# Patient Record
Sex: FEMALE | Race: WHITE | HISPANIC OR LATINO | Employment: UNEMPLOYED | URBAN - METROPOLITAN AREA
[De-identification: names, ages, dates, MRNs, and addresses within clinical notes are randomized per-mention and may not be internally consistent; named-entity substitution may affect disease eponyms.]

---

## 2017-01-16 ENCOUNTER — HOSPITAL ENCOUNTER (OUTPATIENT)
Dept: LAB | Facility: MEDICAL CENTER | Age: 67
End: 2017-01-16
Attending: NURSE PRACTITIONER
Payer: COMMERCIAL

## 2017-01-16 LAB
ALBUMIN SERPL BCP-MCNC: 4.2 G/DL (ref 3.2–4.9)
ALBUMIN/GLOB SERPL: 0.9 G/DL
ALP SERPL-CCNC: 242 U/L (ref 30–99)
ALT SERPL-CCNC: 93 U/L (ref 2–50)
AMYLASE SERPL-CCNC: 97 U/L (ref 20–103)
ANION GAP SERPL CALC-SCNC: 8 MMOL/L (ref 0–11.9)
AST SERPL-CCNC: 41 U/L (ref 12–45)
BILIRUB SERPL-MCNC: 1.4 MG/DL (ref 0.1–1.5)
BUN SERPL-MCNC: 27 MG/DL (ref 8–22)
CALCIUM SERPL-MCNC: 10 MG/DL (ref 8.5–10.5)
CHLORIDE SERPL-SCNC: 107 MMOL/L (ref 96–112)
CHOLEST SERPL-MCNC: 173 MG/DL (ref 100–199)
CO2 SERPL-SCNC: 23 MMOL/L (ref 20–33)
CREAT SERPL-MCNC: 1.41 MG/DL (ref 0.5–1.4)
GLOBULIN SER CALC-MCNC: 4.5 G/DL (ref 1.9–3.5)
GLUCOSE SERPL-MCNC: 121 MG/DL (ref 65–99)
HAV IGM SERPL QL IA: NEGATIVE
HBV CORE IGM SERPL QL IA: NEGATIVE
HBV SURFACE AG SERPL QL IA: NEGATIVE
HCV AB S/CO SERPL IA: NEGATIVE
HDLC SERPL-MCNC: 45 MG/DL
HIV 1+2 AB+HIV1 P24 AG SERPL QL IA: NON REACTIVE
LDLC SERPL CALC-MCNC: 107 MG/DL
LIPASE SERPL-CCNC: 75 U/L (ref 11–82)
POTASSIUM SERPL-SCNC: 4.7 MMOL/L (ref 3.6–5.5)
PROT SERPL-MCNC: 8.7 G/DL (ref 6–8.2)
SODIUM SERPL-SCNC: 138 MMOL/L (ref 135–145)
TRIGL SERPL-MCNC: 107 MG/DL (ref 0–149)
TSH SERPL DL<=0.005 MIU/L-ACNC: 2.21 UIU/ML (ref 0.3–3.7)

## 2017-01-16 PROCEDURE — 80074 ACUTE HEPATITIS PANEL: CPT

## 2017-01-16 PROCEDURE — 36415 COLL VENOUS BLD VENIPUNCTURE: CPT

## 2017-01-16 PROCEDURE — 84075 ASSAY ALKALINE PHOSPHATASE: CPT

## 2017-01-16 PROCEDURE — 82150 ASSAY OF AMYLASE: CPT

## 2017-01-16 PROCEDURE — 84443 ASSAY THYROID STIM HORMONE: CPT

## 2017-01-16 PROCEDURE — 80053 COMPREHEN METABOLIC PANEL: CPT

## 2017-01-16 PROCEDURE — 87389 HIV-1 AG W/HIV-1&-2 AB AG IA: CPT

## 2017-01-16 PROCEDURE — 83690 ASSAY OF LIPASE: CPT

## 2017-01-16 PROCEDURE — 80061 LIPID PANEL: CPT

## 2017-01-16 PROCEDURE — 84080 ASSAY ALKALINE PHOSPHATASES: CPT

## 2017-01-22 LAB
ALP BONE SERPL-CCNC: 99 U/L (ref 0–55)
ALP ISOS SERPL HS-CCNC: 0 U/L
ALP LIVER SERPL-CCNC: 191 U/L (ref 0–94)
ALP SERPL-CCNC: 290 U/L (ref 40–120)

## 2017-01-30 ENCOUNTER — APPOINTMENT (OUTPATIENT)
Dept: LAB | Facility: MEDICAL CENTER | Age: 67
End: 2017-01-30
Attending: NURSE PRACTITIONER
Payer: COMMERCIAL

## 2017-02-01 ENCOUNTER — HOSPITAL ENCOUNTER (OUTPATIENT)
Dept: LAB | Facility: MEDICAL CENTER | Age: 67
End: 2017-02-01
Attending: NURSE PRACTITIONER
Payer: MEDICARE

## 2017-02-01 LAB
ALBUMIN SERPL BCP-MCNC: 4.4 G/DL (ref 3.2–4.9)
ALP SERPL-CCNC: 136 U/L (ref 30–99)
ALT SERPL-CCNC: 25 U/L (ref 2–50)
AST SERPL-CCNC: 24 U/L (ref 12–45)
BILIRUB CONJ SERPL-MCNC: 0.3 MG/DL (ref 0.1–0.5)
BILIRUB INDIRECT SERPL-MCNC: 0.4 MG/DL (ref 0–1)
BILIRUB SERPL-MCNC: 0.7 MG/DL (ref 0.1–1.5)
CA-I SERPL-SCNC: 1.2 MMOL/L (ref 1.1–1.3)
HAV IGM SERPL QL IA: NEGATIVE
HBV CORE IGM SERPL QL IA: NEGATIVE
HBV SURFACE AG SERPL QL IA: NEGATIVE
HCV AB S/CO SERPL IA: NEGATIVE
PROT SERPL-MCNC: 8.8 G/DL (ref 6–8.2)
PTH-INTACT SERPL-MCNC: 113.8 PG/ML (ref 14–72)

## 2017-02-01 PROCEDURE — 36415 COLL VENOUS BLD VENIPUNCTURE: CPT

## 2017-02-01 PROCEDURE — 82107 ALPHA-FETOPROTEIN L3: CPT

## 2017-02-01 PROCEDURE — 82330 ASSAY OF CALCIUM: CPT

## 2017-02-01 PROCEDURE — 80074 ACUTE HEPATITIS PANEL: CPT | Mod: GA

## 2017-02-01 PROCEDURE — 80076 HEPATIC FUNCTION PANEL: CPT

## 2017-02-01 PROCEDURE — 83970 ASSAY OF PARATHORMONE: CPT

## 2017-02-06 LAB
AFP L3 MFR SERPL: <0.5 % (ref 0–9.9)
AFP SERPL-MCNC: 3 NG/ML (ref 0–15)

## 2017-02-08 ENCOUNTER — OFFICE VISIT (OUTPATIENT)
Dept: CARDIOLOGY | Facility: MEDICAL CENTER | Age: 67
End: 2017-02-08
Payer: MEDICARE

## 2017-02-08 VITALS
SYSTOLIC BLOOD PRESSURE: 132 MMHG | HEART RATE: 82 BPM | HEIGHT: 62 IN | DIASTOLIC BLOOD PRESSURE: 82 MMHG | WEIGHT: 143 LBS | BODY MASS INDEX: 26.31 KG/M2 | OXYGEN SATURATION: 97 %

## 2017-02-08 DIAGNOSIS — R07.89 OTHER CHEST PAIN: ICD-10-CM

## 2017-02-08 DIAGNOSIS — R94.31 NONSPECIFIC ABNORMAL ELECTROCARDIOGRAM (ECG) (EKG): ICD-10-CM

## 2017-02-08 PROCEDURE — 3014F SCREEN MAMMO DOC REV: CPT | Mod: 8P | Performed by: INTERNAL MEDICINE

## 2017-02-08 PROCEDURE — G8420 CALC BMI NORM PARAMETERS: HCPCS | Performed by: INTERNAL MEDICINE

## 2017-02-08 PROCEDURE — 1101F PT FALLS ASSESS-DOCD LE1/YR: CPT | Mod: 8P | Performed by: INTERNAL MEDICINE

## 2017-02-08 PROCEDURE — 4040F PNEUMOC VAC/ADMIN/RCVD: CPT | Mod: 8P | Performed by: INTERNAL MEDICINE

## 2017-02-08 PROCEDURE — 3017F COLORECTAL CA SCREEN DOC REV: CPT | Mod: 8P | Performed by: INTERNAL MEDICINE

## 2017-02-08 PROCEDURE — G8432 DEP SCR NOT DOC, RNG: HCPCS | Performed by: INTERNAL MEDICINE

## 2017-02-08 PROCEDURE — G8484 FLU IMMUNIZE NO ADMIN: HCPCS | Performed by: INTERNAL MEDICINE

## 2017-02-08 PROCEDURE — 4004F PT TOBACCO SCREEN RCVD TLK: CPT | Mod: 8P | Performed by: INTERNAL MEDICINE

## 2017-02-08 PROCEDURE — 99204 OFFICE O/P NEW MOD 45 MIN: CPT | Performed by: INTERNAL MEDICINE

## 2017-02-08 RX ORDER — PRAVASTATIN SODIUM 10 MG
10 TABLET ORAL
Status: ON HOLD | COMMUNITY
End: 2019-02-25

## 2017-02-08 RX ORDER — RANITIDINE 150 MG/1
150 TABLET ORAL
Status: ON HOLD | COMMUNITY
End: 2019-11-07

## 2017-02-08 RX ORDER — LANCETS 33 GAUGE
EACH MISCELLANEOUS
COMMUNITY
End: 2018-08-31

## 2017-02-08 RX ORDER — AMLODIPINE BESYLATE 10 MG/1
10 TABLET ORAL
Status: ON HOLD | COMMUNITY
End: 2019-11-07

## 2017-02-08 ASSESSMENT — ENCOUNTER SYMPTOMS
NERVOUS/ANXIOUS: 0
PALPITATIONS: 0
SHORTNESS OF BREATH: 0
HEADACHES: 0
BLURRED VISION: 0
COUGH: 0
EYE DISCHARGE: 0
PND: 0
NAUSEA: 0
HEARTBURN: 0
DEPRESSION: 0
FEVER: 0
DIZZINESS: 0
BRUISES/BLEEDS EASILY: 0
CHILLS: 0
MYALGIAS: 0

## 2017-02-08 NOTE — Clinical Note
"     [facility]   [address]  [place] [address]  Phone: [phone]  Fax: [phone]              Sandra Pretty  1950    Encounter Date: 2/8/2017    Will Guzman M.D.          PROGRESS NOTE:  Subjective:   Sandra Pretty is a 67 y.o. female who presents today in referral from Jamilah Hagan for chest discomfort and abnormal EKG. On January 14 of this year the patient after an evening meal developed right upper quadrant pain radiating to the epigastrium and into the left shoulder. This is associated with nausea and vomiting. The episode lasted 3 hours.  She saw her primary care provider the next day. An EKG was done and she is referred here for \"abnormal EKG\".  The patient's prior medical history is positive for treated hypertension treated hyperlipidemia and treated hypothyroidism. She also has type II diabetes mellitus and states blood sugars are quite good.  Social history she is . No tobacco use no alcohol.  Surgery: Breast biopsy cyst resection and surgery for trauma to the left orbital area  Family history: Negative for premature coronary artery disease.    Office EKG today is normal    Past Medical History   Diagnosis Date   • Diabetes      Past Surgical History   Procedure Laterality Date   • Breast biopsy       benign left biopsy in 2005   • Us-needle core bx-breast panel       No family history on file.  History   Smoking status   • Never Smoker    Smokeless tobacco   • Not on file     No Known Allergies  Outpatient Encounter Prescriptions as of 2/8/2017   Medication Sig Dispense Refill   • ranitidine (ZANTAC) 150 MG Tab Take 150 mg by mouth 2 times a day.     • amlodipine (NORVASC) 10 MG Tab Take 10 mg by mouth every day.     • pravastatin (PRAVACHOL) 10 MG Tab Take 10 mg by mouth every evening.     • LANCETS MICRO THIN 33G Misc by Does not apply route.     • levothyroxine (SYNTHROID) 125 " "MCG TABS Take 125 mcg by mouth every day.     • sertraline (ZOLOFT) 50 MG TABS Take 50 mg by mouth every day.     • glyBURIDE (DIABETA) 5 MG TABS Take 5 mg by mouth every morning with breakfast.     • metformin (GLUCOPHAGE) 500 MG TABS Take 500 mg by mouth. 3 tabs in the am 2 tabs in the evening     • hydrocodone-acetaminophen (VICODIN) 5-500 MG TABS Take 1-2 Tabs by mouth every four hours as needed for 20 doses. 20 Each 0     No facility-administered encounter medications on file as of 2/8/2017.     Review of Systems   Constitutional: Negative for fever, chills and malaise/fatigue.   Eyes: Negative for blurred vision and discharge.   Respiratory: Negative for cough and shortness of breath.    Cardiovascular: Negative for chest pain, palpitations, leg swelling and PND.   Gastrointestinal: Negative for heartburn and nausea.   Genitourinary: Negative for dysuria and urgency.   Musculoskeletal: Negative for myalgias.   Skin: Negative for itching and rash.   Neurological: Negative for dizziness and headaches.   Endo/Heme/Allergies: Negative for environmental allergies. Does not bruise/bleed easily.   Psychiatric/Behavioral: Negative for depression. The patient is not nervous/anxious.         Objective:   /82 mmHg  Pulse 82  Ht 1.575 m (5' 2.01\")  Wt 64.864 kg (143 lb)  BMI 26.15 kg/m2  SpO2 97%    Physical Exam   Constitutional: She is oriented to person, place, and time. She appears well-developed and well-nourished.   HENT:   Head: Normocephalic and atraumatic.   Eyes: Conjunctivae and EOM are normal. No scleral icterus.   Neck: Neck supple. No JVD present. No thyromegaly present.   Cardiovascular: Normal rate, regular rhythm, normal heart sounds and intact distal pulses.  Exam reveals no gallop and no friction rub.    No murmur heard.  Pulmonary/Chest: Effort normal and breath sounds normal. No respiratory distress. She has no wheezes. She has no rales. She exhibits no tenderness.   Abdominal: Soft. Bowel " sounds are normal. She exhibits no distension and no mass. There is no tenderness.   Neurological: She is alert and oriented to person, place, and time. Coordination normal.   Skin: Skin is warm and dry. No rash noted. No pallor.   Psychiatric: She has a normal mood and affect. Her behavior is normal. Judgment and thought content normal.       Assessment:     1. Other chest pain     2. Nonspecific abnormal electrocardiogram (ECG) (EKG)         Medical Decision Making:  Today's Assessment / Status / Plan:   The patient's single syndrome of chest and abdominal and left shoulder pain is very atypical for a cardiac etiology.  With nausea and vomiting one wonders about viral gastroenteritis or biliary colic.  Nevertheless, with a normal office EKG noted here today and no further symptoms I did not recommend any cardiac evaluation.  Thank you for this consultation        Jamilah Hagan, N.P.  [address] [facility] [facility] [address]  VIA Facsimile: [phone]     ESTEBAN Meza  [address] [address]  VIA Facsimile: [phone]

## 2017-02-08 NOTE — MR AVS SNAPSHOT
"        Sandra Francois Pretty   2017 3:15 PM   Office Visit   MRN: [id number]    Department:  Heart [facility]   Dept Phone:  [phone]    Description:  Female : 1950   Provider:  Will Guzman M.D.           Allergies as of 2017     No Known Allergies      You were diagnosed with     Other chest pain   [786.59.ICD-9-CM]       Nonspecific abnormal electrocardiogram (ECG) (EKG)   [794.31.ICD-9-CM]         Vital Signs     Blood Pressure Pulse Height Weight Body Mass Index Oxygen Saturation    132/82 mmHg 82 1.575 m (5' 2.01\") 64.864 kg (143 lb) 26.15 kg/m2 97%    Smoking Status                   Never Smoker            Basic Information     Date Of Birth Sex Race Ethnicity Preferred Language    1950 Female White  Origin (German,East Timorese,Lebanese,Micronesian, etc) English      Health Maintenance     Patient has no pending health maintenance at this time      Current Immunizations     No immunizations on file.      Below and/or attached are the medications your provider expects you to take. Review all of your home medications and newly ordered medications with your provider and/or pharmacist. Follow medication instructions as directed by your provider and/or pharmacist. Please keep your medication list with you and share with your provider. Update the information when medications are discontinued, doses are changed, or new medications (including over-the-counter products) are added; and carry medication information at all times in the event of emergency situations     Allergies:  No Known Allergies          Medications  Valid as of: 2017 -  4:09 PM    Generic Name Brand Name Tablet Size Instructions for use    AmLODIPine Besylate (Tab) NORVASC 10 MG Take 10 mg by mouth every day.        GlyBURIDE (Tab) DIABETA 5 MG Take 5 mg by mouth every morning with breakfast.        Hydrocodone-Acetaminophen (Tab) VICODIN 5-500 MG Take 1-2 Tabs by mouth every four hours as needed " for 20 doses.        Lancets (Misc) LANCETS MICRO THIN 33G  by Does not apply route.        Levothyroxine Sodium (Tab) SYNTHROID 125 MCG Take 125 mcg by mouth every day.        MetFORMIN HCl (Tab) GLUCOPHAGE 500 MG Take 500 mg by mouth. 3 tabs in the am 2 tabs in the evening        Pravastatin Sodium (Tab) PRAVACHOL 10 MG Take 10 mg by mouth every evening.        RaNITidine HCl (Tab) ZANTAC 150 MG Take 150 mg by mouth 2 times a day.        Sertraline HCl (Tab) ZOLOFT 50 MG Take 50 mg by mouth every day.        .                 Medicines prescribed today were sent to:     [facility], [place] - [address]    [address] [place] [address]    Phone: [phone] Fax: [phone]    Open 24 Hours?: No      Medication refill instructions:       If your prescription bottle indicates you have medication refills left, it is not necessary to call your provider’s office. Please contact your pharmacy and they will refill your medication.    If your prescription bottle indicates you do not have any refills left, you may request refills at any time through one of the following ways: The online [employer] system (except Urgent Care), by calling your provider’s office, or by asking your pharmacy to contact your provider’s office with a refill request. Medication refills are processed only during regular business hours and may not be available until the next business day. Your provider may request additional information or to have a follow-up visit with you prior to refilling your medication.   *Please Note: Medication refills are assigned a new Rx number when refilled electronically. Your pharmacy may indicate that no refills were authorized even though a new prescription for the same medication is available at the pharmacy. Please request the medicine by name with the pharmacy before contacting your provider for a refill.           MyChart Status: Patient Declined

## 2017-02-09 NOTE — PROGRESS NOTES
"Subjective:   Sandra Pretty is a 67 y.o. female who presents today in referral from Jamilah Hagan for chest discomfort and abnormal EKG. On January 14 of this year the patient after an evening meal developed right upper quadrant pain radiating to the epigastrium and into the left shoulder. This is associated with nausea and vomiting. The episode lasted 3 hours.  She saw her primary care provider the next day. An EKG was done and she is referred here for \"abnormal EKG\".  The patient's prior medical history is positive for treated hypertension treated hyperlipidemia and treated hypothyroidism. She also has type II diabetes mellitus and states blood sugars are quite good.  Social history she is . No tobacco use no alcohol.  Surgery: Breast biopsy cyst resection and surgery for trauma to the left orbital area  Family history: Negative for premature coronary artery disease.    Office EKG today is normal    Past Medical History   Diagnosis Date   • Diabetes      Past Surgical History   Procedure Laterality Date   • Breast biopsy       benign left biopsy in 2005   • Us-needle core bx-breast panel       No family history on file.  History   Smoking status   • Never Smoker    Smokeless tobacco   • Not on file     No Known Allergies  Outpatient Encounter Prescriptions as of 2/8/2017   Medication Sig Dispense Refill   • ranitidine (ZANTAC) 150 MG Tab Take 150 mg by mouth 2 times a day.     • amlodipine (NORVASC) 10 MG Tab Take 10 mg by mouth every day.     • pravastatin (PRAVACHOL) 10 MG Tab Take 10 mg by mouth every evening.     • LANCETS MICRO THIN 33G Misc by Does not apply route.     • levothyroxine (SYNTHROID) 125 MCG TABS Take 125 mcg by mouth every day.     • sertraline (ZOLOFT) 50 MG TABS Take 50 mg by mouth every day.     • glyBURIDE (DIABETA) 5 MG TABS Take 5 mg by mouth every morning with breakfast.     • metformin (GLUCOPHAGE) 500 MG TABS Take 500 mg by mouth. 3 tabs in the am 2 tabs in the " "evening     • hydrocodone-acetaminophen (VICODIN) 5-500 MG TABS Take 1-2 Tabs by mouth every four hours as needed for 20 doses. 20 Each 0     No facility-administered encounter medications on file as of 2/8/2017.     Review of Systems   Constitutional: Negative for fever, chills and malaise/fatigue.   Eyes: Negative for blurred vision and discharge.   Respiratory: Negative for cough and shortness of breath.    Cardiovascular: Negative for chest pain, palpitations, leg swelling and PND.   Gastrointestinal: Negative for heartburn and nausea.   Genitourinary: Negative for dysuria and urgency.   Musculoskeletal: Negative for myalgias.   Skin: Negative for itching and rash.   Neurological: Negative for dizziness and headaches.   Endo/Heme/Allergies: Negative for environmental allergies. Does not bruise/bleed easily.   Psychiatric/Behavioral: Negative for depression. The patient is not nervous/anxious.         Objective:   /82 mmHg  Pulse 82  Ht 1.575 m (5' 2.01\")  Wt 64.864 kg (143 lb)  BMI 26.15 kg/m2  SpO2 97%    Physical Exam   Constitutional: She is oriented to person, place, and time. She appears well-developed and well-nourished.   HENT:   Head: Normocephalic and atraumatic.   Eyes: Conjunctivae and EOM are normal. No scleral icterus.   Neck: Neck supple. No JVD present. No thyromegaly present.   Cardiovascular: Normal rate, regular rhythm, normal heart sounds and intact distal pulses.  Exam reveals no gallop and no friction rub.    No murmur heard.  Pulmonary/Chest: Effort normal and breath sounds normal. No respiratory distress. She has no wheezes. She has no rales. She exhibits no tenderness.   Abdominal: Soft. Bowel sounds are normal. She exhibits no distension and no mass. There is no tenderness.   Neurological: She is alert and oriented to person, place, and time. Coordination normal.   Skin: Skin is warm and dry. No rash noted. No pallor.   Psychiatric: She has a normal mood and affect. Her " behavior is normal. Judgment and thought content normal.       Assessment:     1. Other chest pain     2. Nonspecific abnormal electrocardiogram (ECG) (EKG)         Medical Decision Making:  Today's Assessment / Status / Plan:   The patient's single syndrome of chest and abdominal and left shoulder pain is very atypical for a cardiac etiology.  With nausea and vomiting one wonders about viral gastroenteritis or biliary colic.  Nevertheless, with a normal office EKG noted here today and no further symptoms I did not recommend any cardiac evaluation.  Thank you for this consultation

## 2017-05-15 ENCOUNTER — HOSPITAL ENCOUNTER (OUTPATIENT)
Dept: LAB | Facility: MEDICAL CENTER | Age: 67
End: 2017-05-15
Attending: INTERNAL MEDICINE
Payer: MEDICARE

## 2017-05-15 LAB
AMYLASE SERPL-CCNC: 131 U/L (ref 20–103)
CRP SERPL HS-MCNC: 0.14 MG/DL (ref 0–0.75)
ERYTHROCYTE [SEDIMENTATION RATE] IN BLOOD BY WESTERGREN METHOD: 97 MM/HOUR (ref 0–30)
FERRITIN SERPL-MCNC: 261.6 NG/ML (ref 10–291)
GGT SERPL-CCNC: 31 U/L (ref 7–34)
HBV CORE AB SERPL QL IA: NEGATIVE
HBV SURFACE AB SERPL IA-ACNC: <3.1 MIU/ML (ref 0–10)
IRON SATN MFR SERPL: 34 % (ref 15–55)
IRON SERPL-MCNC: 108 UG/DL (ref 40–170)
LIPASE SERPL-CCNC: 145 U/L (ref 11–82)
TIBC SERPL-MCNC: 315 UG/DL (ref 250–450)

## 2017-05-15 PROCEDURE — 86140 C-REACTIVE PROTEIN: CPT

## 2017-05-15 PROCEDURE — 82728 ASSAY OF FERRITIN: CPT | Mod: GA

## 2017-05-15 PROCEDURE — 83540 ASSAY OF IRON: CPT | Mod: GA

## 2017-05-15 PROCEDURE — 83550 IRON BINDING TEST: CPT | Mod: GA

## 2017-05-15 PROCEDURE — 86235 NUCLEAR ANTIGEN ANTIBODY: CPT | Mod: 91

## 2017-05-15 PROCEDURE — 86708 HEPATITIS A ANTIBODY: CPT

## 2017-05-15 PROCEDURE — 86225 DNA ANTIBODY NATIVE: CPT

## 2017-05-15 PROCEDURE — 86704 HEP B CORE ANTIBODY TOTAL: CPT

## 2017-05-15 PROCEDURE — 85652 RBC SED RATE AUTOMATED: CPT

## 2017-05-15 PROCEDURE — 83690 ASSAY OF LIPASE: CPT

## 2017-05-15 PROCEDURE — 86706 HEP B SURFACE ANTIBODY: CPT

## 2017-05-15 PROCEDURE — 83516 IMMUNOASSAY NONANTIBODY: CPT | Mod: 91

## 2017-05-15 PROCEDURE — 86038 ANTINUCLEAR ANTIBODIES: CPT

## 2017-05-15 PROCEDURE — 82105 ALPHA-FETOPROTEIN SERUM: CPT | Mod: GA

## 2017-05-15 PROCEDURE — 86255 FLUORESCENT ANTIBODY SCREEN: CPT

## 2017-05-15 PROCEDURE — 36415 COLL VENOUS BLD VENIPUNCTURE: CPT

## 2017-05-15 PROCEDURE — 82150 ASSAY OF AMYLASE: CPT

## 2017-05-15 PROCEDURE — 82390 ASSAY OF CERULOPLASMIN: CPT

## 2017-05-15 PROCEDURE — 82784 ASSAY IGA/IGD/IGG/IGM EACH: CPT

## 2017-05-15 PROCEDURE — 82977 ASSAY OF GGT: CPT

## 2017-05-16 LAB
AFP-TM SERPL-MCNC: 3 NG/ML (ref 0–9)
IGA SERPL-MCNC: 615 MG/DL (ref 68–408)

## 2017-05-17 LAB
CERULOPLASMIN SERPL-MCNC: 27 MG/DL (ref 17–54)
HAV AB SER QL IA: POSITIVE
MITOCHONDRIA M2 IGG SER-ACNC: 12.1 UNITS (ref 0–20)
TTG IGA SER IA-ACNC: 2 U/ML (ref 0–3)

## 2017-05-19 LAB
DSDNA AB TITR SER CLIF: DETECTED {TITER}
DSDNA IGG TITR SER CLIF: ABNORMAL {TITER}
ENA SM IGG SER-ACNC: 0 AU/ML (ref 0–40)
ENA SS-B IGG SER IA-ACNC: 4 AU/ML (ref 0–40)
NUCLEAR IGG SER QL IA: DETECTED
NUCLEAR IGG TITR SER IF: ABNORMAL {TITER}
SSA52 R0ENA AB IGG Q0420: 13 AU/ML (ref 0–40)
SSA60 R0ENA AB IGG Q0419: 50 AU/ML (ref 0–40)
U1 SNRNP IGG SER QL: 1 AU/ML (ref 0–40)

## 2017-05-22 LAB
SMA IGG SER-ACNC: 26 UNITS (ref 0–19)
SMOOTH MUSCLE IGG TITR SER: ABNORMAL {TITER}

## 2017-05-30 ENCOUNTER — HOSPITAL ENCOUNTER (OUTPATIENT)
Dept: LAB | Facility: MEDICAL CENTER | Age: 67
End: 2017-05-30
Attending: INTERNAL MEDICINE
Payer: MEDICARE

## 2017-05-30 LAB
25(OH)D3 SERPL-MCNC: 37 NG/ML (ref 30–100)
ALBUMIN SERPL BCP-MCNC: 3.6 G/DL (ref 3.2–4.9)
APPEARANCE UR: CLEAR
BACTERIA #/AREA URNS HPF: ABNORMAL /HPF
BASOPHILS # BLD AUTO: 0.5 % (ref 0–1.8)
BASOPHILS # BLD: 0.03 K/UL (ref 0–0.12)
BILIRUB UR QL STRIP.AUTO: NEGATIVE
BUN SERPL-MCNC: 25 MG/DL (ref 8–22)
CALCIUM SERPL-MCNC: 9.6 MG/DL (ref 8.5–10.5)
CHLORIDE SERPL-SCNC: 105 MMOL/L (ref 96–112)
CO2 SERPL-SCNC: 24 MMOL/L (ref 20–33)
COLOR UR: YELLOW
CREAT SERPL-MCNC: 1.15 MG/DL (ref 0.5–1.4)
CREAT UR-MCNC: 179.3 MG/DL
EOSINOPHIL # BLD AUTO: 0.07 K/UL (ref 0–0.51)
EOSINOPHIL NFR BLD: 1.3 % (ref 0–6.9)
EPI CELLS #/AREA URNS HPF: ABNORMAL /HPF
ERYTHROCYTE [DISTWIDTH] IN BLOOD BY AUTOMATED COUNT: 47.4 FL (ref 35.9–50)
FERRITIN SERPL-MCNC: 312.1 NG/ML (ref 10–291)
GFR SERPL CREATININE-BSD FRML MDRD: 47 ML/MIN/1.73 M 2
GLUCOSE SERPL-MCNC: 252 MG/DL (ref 65–99)
GLUCOSE UR STRIP.AUTO-MCNC: ABNORMAL MG/DL
HCT VFR BLD AUTO: 32.7 % (ref 37–47)
HGB BLD-MCNC: 11.3 G/DL (ref 12–16)
HYALINE CASTS #/AREA URNS LPF: ABNORMAL /LPF
IMM GRANULOCYTES # BLD AUTO: 0.01 K/UL (ref 0–0.11)
IMM GRANULOCYTES NFR BLD AUTO: 0.2 % (ref 0–0.9)
IRON SATN MFR SERPL: 49 % (ref 15–55)
IRON SERPL-MCNC: 148 UG/DL (ref 40–170)
KETONES UR STRIP.AUTO-MCNC: NEGATIVE MG/DL
LEUKOCYTE ESTERASE UR QL STRIP.AUTO: NEGATIVE
LYMPHOCYTES # BLD AUTO: 1.6 K/UL (ref 1–4.8)
LYMPHOCYTES NFR BLD: 29.2 % (ref 22–41)
MCH RBC QN AUTO: 34.2 PG (ref 27–33)
MCHC RBC AUTO-ENTMCNC: 34.6 G/DL (ref 33.6–35)
MCV RBC AUTO: 99.1 FL (ref 81.4–97.8)
MICRO URNS: ABNORMAL
MONOCYTES # BLD AUTO: 0.32 K/UL (ref 0–0.85)
MONOCYTES NFR BLD AUTO: 5.8 % (ref 0–13.4)
MUCOUS THREADS #/AREA URNS HPF: ABNORMAL /HPF
NEUTROPHILS # BLD AUTO: 3.45 K/UL (ref 2–7.15)
NEUTROPHILS NFR BLD: 63 % (ref 44–72)
NITRITE UR QL STRIP.AUTO: NEGATIVE
NRBC # BLD AUTO: 0 K/UL
NRBC BLD AUTO-RTO: 0 /100 WBC
PH UR STRIP.AUTO: 6 [PH]
PHOSPHATE SERPL-MCNC: 3.7 MG/DL (ref 2.5–4.5)
PLATELET # BLD AUTO: 190 K/UL (ref 164–446)
PMV BLD AUTO: 10.1 FL (ref 9–12.9)
POTASSIUM SERPL-SCNC: 4.4 MMOL/L (ref 3.6–5.5)
PROT UR QL STRIP: 300 MG/DL
PROT UR-MCNC: 745.8 MG/DL (ref 0–15)
PROT/CREAT UR: 4160 MG/G (ref 10–107)
PTH-INTACT SERPL-MCNC: 113.7 PG/ML (ref 14–72)
RBC # BLD AUTO: 3.3 M/UL (ref 4.2–5.4)
RBC # URNS HPF: ABNORMAL /HPF
RBC UR QL AUTO: ABNORMAL
SODIUM SERPL-SCNC: 136 MMOL/L (ref 135–145)
SP GR UR STRIP.AUTO: 1.02
TIBC SERPL-MCNC: 305 UG/DL (ref 250–450)
URATE SERPL-MCNC: 7.6 MG/DL (ref 1.9–8.2)
WBC # BLD AUTO: 5.5 K/UL (ref 4.8–10.8)
WBC #/AREA URNS HPF: ABNORMAL /HPF

## 2017-05-30 PROCEDURE — 82306 VITAMIN D 25 HYDROXY: CPT

## 2017-05-30 PROCEDURE — 84550 ASSAY OF BLOOD/URIC ACID: CPT

## 2017-05-30 PROCEDURE — 83540 ASSAY OF IRON: CPT

## 2017-05-30 PROCEDURE — 85025 COMPLETE CBC W/AUTO DIFF WBC: CPT

## 2017-05-30 PROCEDURE — 36415 COLL VENOUS BLD VENIPUNCTURE: CPT

## 2017-05-30 PROCEDURE — 81001 URINALYSIS AUTO W/SCOPE: CPT

## 2017-05-30 PROCEDURE — 83970 ASSAY OF PARATHORMONE: CPT

## 2017-05-30 PROCEDURE — 80069 RENAL FUNCTION PANEL: CPT

## 2017-05-30 PROCEDURE — 83550 IRON BINDING TEST: CPT

## 2017-05-30 PROCEDURE — 82570 ASSAY OF URINE CREATININE: CPT

## 2017-05-30 PROCEDURE — 84156 ASSAY OF PROTEIN URINE: CPT

## 2017-05-30 PROCEDURE — 82728 ASSAY OF FERRITIN: CPT

## 2017-06-06 ENCOUNTER — HOSPITAL ENCOUNTER (OUTPATIENT)
Dept: LAB | Facility: MEDICAL CENTER | Age: 67
End: 2017-06-06
Attending: INTERNAL MEDICINE
Payer: MEDICARE

## 2017-06-06 LAB
AMYLASE SERPL-CCNC: 118 U/L (ref 20–103)
LIPASE SERPL-CCNC: 103 U/L (ref 11–82)

## 2017-06-06 PROCEDURE — 83690 ASSAY OF LIPASE: CPT

## 2017-06-06 PROCEDURE — 82150 ASSAY OF AMYLASE: CPT

## 2017-06-06 PROCEDURE — 83516 IMMUNOASSAY NONANTIBODY: CPT

## 2017-06-06 PROCEDURE — 36415 COLL VENOUS BLD VENIPUNCTURE: CPT

## 2017-06-06 PROCEDURE — 86256 FLUORESCENT ANTIBODY TITER: CPT

## 2017-06-09 LAB
SMA IGG SER-ACNC: 31 UNITS (ref 0–19)
SMOOTH MUSCLE IGG TITR SER: ABNORMAL {TITER}

## 2017-06-12 ENCOUNTER — HOSPITAL ENCOUNTER (OUTPATIENT)
Dept: LAB | Facility: MEDICAL CENTER | Age: 67
End: 2017-06-12
Attending: INTERNAL MEDICINE
Payer: MEDICARE

## 2017-06-12 LAB
ALBUMIN SERPL BCP-MCNC: 3.8 G/DL (ref 3.2–4.9)
ALP SERPL-CCNC: 116 U/L (ref 30–99)
ALT SERPL-CCNC: 29 U/L (ref 2–50)
AST SERPL-CCNC: 26 U/L (ref 12–45)
BILIRUB CONJ SERPL-MCNC: 0.2 MG/DL (ref 0.1–0.5)
BILIRUB INDIRECT SERPL-MCNC: 0.5 MG/DL (ref 0–1)
BILIRUB SERPL-MCNC: 0.7 MG/DL (ref 0.1–1.5)
C3 SERPL-MCNC: 122 MG/DL (ref 87–200)
C4 SERPL-MCNC: 13 MG/DL (ref 19–52)
CRP SERPL HS-MCNC: 0.17 MG/DL (ref 0–0.75)
ERYTHROCYTE [SEDIMENTATION RATE] IN BLOOD BY WESTERGREN METHOD: 116 MM/HOUR (ref 0–30)
PROT SERPL-MCNC: 8.2 G/DL (ref 6–8.2)

## 2017-06-12 PROCEDURE — 36415 COLL VENOUS BLD VENIPUNCTURE: CPT

## 2017-06-12 PROCEDURE — 80500 HCHG CLINICAL PATH CONSULT-LIMITED: CPT | Mod: 59

## 2017-06-12 PROCEDURE — 86225 DNA ANTIBODY NATIVE: CPT

## 2017-06-12 PROCEDURE — 85610 PROTHROMBIN TIME: CPT | Mod: GA

## 2017-06-12 PROCEDURE — 85730 THROMBOPLASTIN TIME PARTIAL: CPT | Mod: GA,91

## 2017-06-12 PROCEDURE — 86147 CARDIOLIPIN ANTIBODY EA IG: CPT

## 2017-06-12 PROCEDURE — 86146 BETA-2 GLYCOPROTEIN ANTIBODY: CPT

## 2017-06-12 PROCEDURE — 85732 THROMBOPLASTIN TIME PARTIAL: CPT

## 2017-06-12 PROCEDURE — 85613 RUSSELL VIPER VENOM DILUTED: CPT

## 2017-06-12 PROCEDURE — 85652 RBC SED RATE AUTOMATED: CPT

## 2017-06-12 PROCEDURE — 80076 HEPATIC FUNCTION PANEL: CPT

## 2017-06-12 PROCEDURE — 86255 FLUORESCENT ANTIBODY SCREEN: CPT

## 2017-06-12 PROCEDURE — 86140 C-REACTIVE PROTEIN: CPT

## 2017-06-12 PROCEDURE — 86160 COMPLEMENT ANTIGEN: CPT | Mod: 91

## 2017-06-13 LAB
APTT 1H NP PPP: 45.1 SEC (ref 24.7–36)
APTT 1H P INC POOL PPP: 31.6 SEC (ref 24.7–36)
APTT 1H P INC PPP: 50.7 SEC (ref 24.7–36)
APTT IMM NP PPP: 43.9 SEC (ref 24.7–36)
APTT P HEP NEUT PPP: 47.1 SEC (ref 24.7–36)
APTT POOL PPP: 31.5 SEC (ref 24.7–36)
APTT PPP: 44.5 SEC (ref 24.7–36)
APTT PPP: 44.5 SEC (ref 24.7–36)
DRVVT MIX 37 DRVMX37: 50.6 SEC (ref 28–48)
DRVVT MIX IMMEDIATE DRVMXI: 49.7 SEC (ref 28–48)
INR PPP: 0.92 (ref 0.87–1.13)
LA PPP-IMP: POSITIVE
LA PPP-IMP: PRESENT
PATH REV: NORMAL
PATH REV: NORMAL
PROTHROMBIN TIME: 12.6 SEC (ref 12–14.6)
SCREEN DRVVT: 56.8 SEC (ref 28–48)

## 2017-06-13 PROCEDURE — 80500 HCHG CLINICAL PATH CONSULT-LIMITED: CPT

## 2017-06-14 LAB
ANCA IGG TITR SER IF: NORMAL {TITER}
B2 GLYCOPROT1 IGG SERPL IA-ACNC: 6 SGU (ref 0–20)
B2 GLYCOPROT1 IGM SERPL IA-ACNC: 11 SMU (ref 0–20)
CARDIOLIPIN IGA SER IA-ACNC: 6 APL (ref 0–11)
CARDIOLIPIN IGG SER IA-ACNC: 14 GPL (ref 0–14)
CARDIOLIPIN IGM SER IA-ACNC: 17 MPL (ref 0–12)

## 2017-06-15 LAB
DSDNA AB TITR SER CLIF: DETECTED {TITER}
DSDNA IGG TITR SER CLIF: ABNORMAL {TITER}

## 2017-07-05 ENCOUNTER — APPOINTMENT (OUTPATIENT)
Dept: RADIOLOGY | Facility: MEDICAL CENTER | Age: 67
End: 2017-07-05
Attending: EMERGENCY MEDICINE
Payer: MEDICARE

## 2017-07-05 ENCOUNTER — HOSPITAL ENCOUNTER (EMERGENCY)
Facility: MEDICAL CENTER | Age: 67
End: 2017-07-05
Attending: EMERGENCY MEDICINE
Payer: MEDICARE

## 2017-07-05 VITALS
WEIGHT: 145.28 LBS | BODY MASS INDEX: 26.74 KG/M2 | HEART RATE: 68 BPM | RESPIRATION RATE: 13 BRPM | HEIGHT: 62 IN | OXYGEN SATURATION: 99 % | SYSTOLIC BLOOD PRESSURE: 152 MMHG | DIASTOLIC BLOOD PRESSURE: 64 MMHG | TEMPERATURE: 97.9 F

## 2017-07-05 DIAGNOSIS — K86.1 CHRONIC PANCREATITIS, UNSPECIFIED PANCREATITIS TYPE (HCC): ICD-10-CM

## 2017-07-05 LAB
ALBUMIN SERPL BCP-MCNC: 3.9 G/DL (ref 3.2–4.9)
ALBUMIN/GLOB SERPL: 1 G/DL
ALP SERPL-CCNC: 116 U/L (ref 30–99)
ALT SERPL-CCNC: 26 U/L (ref 2–50)
ANION GAP SERPL CALC-SCNC: 7 MMOL/L (ref 0–11.9)
APPEARANCE UR: CLEAR
AST SERPL-CCNC: 24 U/L (ref 12–45)
BACTERIA #/AREA URNS HPF: NEGATIVE /HPF
BASOPHILS # BLD AUTO: 0.3 % (ref 0–1.8)
BASOPHILS # BLD: 0.02 K/UL (ref 0–0.12)
BILIRUB SERPL-MCNC: 0.7 MG/DL (ref 0.1–1.5)
BILIRUB UR QL STRIP.AUTO: NEGATIVE
BUN SERPL-MCNC: 25 MG/DL (ref 8–22)
CALCIUM SERPL-MCNC: 9.4 MG/DL (ref 8.5–10.5)
CHLORIDE SERPL-SCNC: 108 MMOL/L (ref 96–112)
CO2 SERPL-SCNC: 22 MMOL/L (ref 20–33)
COLOR UR: YELLOW
CREAT SERPL-MCNC: 1.06 MG/DL (ref 0.5–1.4)
CULTURE IF INDICATED INDCX: NO UA CULTURE
EOSINOPHIL # BLD AUTO: 0.06 K/UL (ref 0–0.51)
EOSINOPHIL NFR BLD: 1 % (ref 0–6.9)
EPI CELLS #/AREA URNS HPF: NEGATIVE /HPF
ERYTHROCYTE [DISTWIDTH] IN BLOOD BY AUTOMATED COUNT: 42.5 FL (ref 35.9–50)
GFR SERPL CREATININE-BSD FRML MDRD: 52 ML/MIN/1.73 M 2
GLOBULIN SER CALC-MCNC: 4 G/DL (ref 1.9–3.5)
GLUCOSE SERPL-MCNC: 177 MG/DL (ref 65–99)
GLUCOSE UR STRIP.AUTO-MCNC: NEGATIVE MG/DL
HCT VFR BLD AUTO: 33.1 % (ref 37–47)
HGB BLD-MCNC: 11.6 G/DL (ref 12–16)
HYALINE CASTS #/AREA URNS LPF: ABNORMAL /LPF
IMM GRANULOCYTES # BLD AUTO: 0.02 K/UL (ref 0–0.11)
IMM GRANULOCYTES NFR BLD AUTO: 0.3 % (ref 0–0.9)
KETONES UR STRIP.AUTO-MCNC: NEGATIVE MG/DL
LEUKOCYTE ESTERASE UR QL STRIP.AUTO: NEGATIVE
LIPASE SERPL-CCNC: 125 U/L (ref 11–82)
LYMPHOCYTES # BLD AUTO: 1.75 K/UL (ref 1–4.8)
LYMPHOCYTES NFR BLD: 29.6 % (ref 22–41)
MCH RBC QN AUTO: 33.8 PG (ref 27–33)
MCHC RBC AUTO-ENTMCNC: 35 G/DL (ref 33.6–35)
MCV RBC AUTO: 96.5 FL (ref 81.4–97.8)
MICRO URNS: ABNORMAL
MONOCYTES # BLD AUTO: 0.38 K/UL (ref 0–0.85)
MONOCYTES NFR BLD AUTO: 6.4 % (ref 0–13.4)
NEUTROPHILS # BLD AUTO: 3.69 K/UL (ref 2–7.15)
NEUTROPHILS NFR BLD: 62.4 % (ref 44–72)
NITRITE UR QL STRIP.AUTO: NEGATIVE
NRBC # BLD AUTO: 0 K/UL
NRBC BLD AUTO-RTO: 0 /100 WBC
PH UR STRIP.AUTO: 5 [PH]
PLATELET # BLD AUTO: 185 K/UL (ref 164–446)
PMV BLD AUTO: 9.7 FL (ref 9–12.9)
POTASSIUM SERPL-SCNC: 4.4 MMOL/L (ref 3.6–5.5)
PROT SERPL-MCNC: 7.9 G/DL (ref 6–8.2)
PROT UR QL STRIP: 300 MG/DL
RBC # BLD AUTO: 3.43 M/UL (ref 4.2–5.4)
RBC # URNS HPF: ABNORMAL /HPF
RBC UR QL AUTO: ABNORMAL
SODIUM SERPL-SCNC: 137 MMOL/L (ref 135–145)
SP GR UR STRIP.AUTO: 1.01
TROPONIN I SERPL-MCNC: <0.01 NG/ML (ref 0–0.04)
WBC # BLD AUTO: 5.9 K/UL (ref 4.8–10.8)
WBC #/AREA URNS HPF: ABNORMAL /HPF

## 2017-07-05 PROCEDURE — 36415 COLL VENOUS BLD VENIPUNCTURE: CPT

## 2017-07-05 PROCEDURE — 74177 CT ABD & PELVIS W/CONTRAST: CPT

## 2017-07-05 PROCEDURE — 96374 THER/PROPH/DIAG INJ IV PUSH: CPT | Mod: XU

## 2017-07-05 PROCEDURE — 99285 EMERGENCY DEPT VISIT HI MDM: CPT

## 2017-07-05 PROCEDURE — 81001 URINALYSIS AUTO W/SCOPE: CPT

## 2017-07-05 PROCEDURE — 84484 ASSAY OF TROPONIN QUANT: CPT

## 2017-07-05 PROCEDURE — 700111 HCHG RX REV CODE 636 W/ 250 OVERRIDE (IP): Performed by: EMERGENCY MEDICINE

## 2017-07-05 PROCEDURE — 96375 TX/PRO/DX INJ NEW DRUG ADDON: CPT

## 2017-07-05 PROCEDURE — 700105 HCHG RX REV CODE 258: Performed by: EMERGENCY MEDICINE

## 2017-07-05 PROCEDURE — 80053 COMPREHEN METABOLIC PANEL: CPT

## 2017-07-05 PROCEDURE — 83690 ASSAY OF LIPASE: CPT

## 2017-07-05 PROCEDURE — 700117 HCHG RX CONTRAST REV CODE 255: Performed by: EMERGENCY MEDICINE

## 2017-07-05 PROCEDURE — 85025 COMPLETE CBC W/AUTO DIFF WBC: CPT

## 2017-07-05 RX ORDER — MORPHINE SULFATE 4 MG/ML
2 INJECTION, SOLUTION INTRAMUSCULAR; INTRAVENOUS
Status: DISCONTINUED | OUTPATIENT
Start: 2017-07-05 | End: 2017-07-05 | Stop reason: HOSPADM

## 2017-07-05 RX ORDER — OXYCODONE HYDROCHLORIDE AND ACETAMINOPHEN 5; 325 MG/1; MG/1
1 TABLET ORAL EVERY 6 HOURS PRN
Qty: 15 TAB | Refills: 0 | Status: SHIPPED | OUTPATIENT
Start: 2017-07-05 | End: 2018-04-20

## 2017-07-05 RX ORDER — SODIUM CHLORIDE 9 MG/ML
1000 INJECTION, SOLUTION INTRAVENOUS ONCE
Status: COMPLETED | OUTPATIENT
Start: 2017-07-05 | End: 2017-07-05

## 2017-07-05 RX ORDER — ONDANSETRON 4 MG/1
4 TABLET, ORALLY DISINTEGRATING ORAL EVERY 8 HOURS PRN
Qty: 12 TAB | Refills: 0 | Status: SHIPPED | OUTPATIENT
Start: 2017-07-05 | End: 2018-04-20

## 2017-07-05 RX ORDER — ONDANSETRON 2 MG/ML
4 INJECTION INTRAMUSCULAR; INTRAVENOUS
Status: DISCONTINUED | OUTPATIENT
Start: 2017-07-05 | End: 2017-07-05 | Stop reason: HOSPADM

## 2017-07-05 RX ORDER — SODIUM CHLORIDE 9 MG/ML
INJECTION, SOLUTION INTRAVENOUS CONTINUOUS
Status: DISCONTINUED | OUTPATIENT
Start: 2017-07-05 | End: 2017-07-05 | Stop reason: HOSPADM

## 2017-07-05 RX ADMIN — SODIUM CHLORIDE: 9 INJECTION, SOLUTION INTRAVENOUS at 11:17

## 2017-07-05 RX ADMIN — ONDANSETRON 4 MG: 2 INJECTION INTRAMUSCULAR; INTRAVENOUS at 10:09

## 2017-07-05 RX ADMIN — SODIUM CHLORIDE 1000 ML: 9 INJECTION, SOLUTION INTRAVENOUS at 10:09

## 2017-07-05 RX ADMIN — MORPHINE SULFATE 2 MG: 4 INJECTION INTRAVENOUS at 10:09

## 2017-07-05 RX ADMIN — IOHEXOL 80 ML: 350 INJECTION, SOLUTION INTRAVENOUS at 12:15

## 2017-07-05 ASSESSMENT — PAIN SCALES - GENERAL: PAINLEVEL_OUTOF10: 1

## 2017-07-05 NOTE — ED NOTES
Chief Complaint   Patient presents with   • LUQ Pain     2 weeks, denies constipation   • Cramping     LLQ       Seen at [facility] for same 2 weeks ago.  Had CT scan.  Given pain RX but has not yet resolved

## 2017-07-05 NOTE — ED AVS SNAPSHOT
"[employer] Access Code: [email or web address]  Expires: 7/23/2017  4:15 AM    Your email address is not on file at [facility].  Email Addresses are required for you to sign up for "[employer] please contact [phone] to verify your personal information and to provide your email address prior to attempting to register for "[employer].    [place] [address]  [place] [address]    "[employer]  A secure, online tool to manage your health information     [facility]’s "[employer]® is a secure, online tool that connects you to your personalized health information from the privacy of your home -- day or night - making it very easy for you to manage your healthcare. Once the activation process is completed, you can even access your medical information using the "[employer] lion, which is available for free in the Apple Lion store or Google Play store.     To learn more about "[employer] visit [email or web address]/"[employer]    There are two levels of access available (as shown below):   My Chart Features  [facility] Primary Care Doctor [facility]  Specialists [facility]  Urgent  Care Non-[facility] Primary Care Doctor   Email your healthcare team securely and privately 24/7 X X X    Manage appointments: schedule your next appointment; view details of past/upcoming appointments X      Request prescription refills. X      View recent personal medical records, including lab and immunizations X X X X   View health record, including health history, allergies, medications X X X X   Read reports about your outpatient visits, procedures, consult and ER notes X X X X   See your discharge summary, which is a recap of your hospital and/or ER visit that includes your diagnosis, lab results, and care plan X X  X     How to register for [employer]:  Once your e-mail address has been verified, follow the following steps to sign up for [employer].     1. Go to  [email or web address]  2. Click on the Sign Up Now box, which takes you to the New Member Sign Up  page. You will need to provide the following information:  a. Enter your [employer] Access Code exactly as it appears at the top of this page. (You will not need to use this code after you’ve completed the sign-up process. If you do not sign up before the expiration date, you must request a new code.)   b. Enter your date of birth.   c. Enter your home email address.   d. Click Submit, and follow the next screen’s instructions.  3. Create a [employer] ID. This will be your [employer] login ID and cannot be changed, so think of one that is secure and easy to remember.  4. Create a [employer] password. You can change your password at any time.  5. Enter your Password Reset Question and Answer. This can be used at a later time if you forget your password.   6. Enter your e-mail address. This allows you to receive e-mail notifications when new information is available in [employer].  7. Click Sign Up. You can now view your health information.    For assistance activating your [employer] account, call [phone]

## 2017-07-05 NOTE — ED PROVIDER NOTES
"ED Provider Note    CHIEF COMPLAINT  Chief Complaint   Patient presents with   • LUQ Pain     2 weeks, denies constipation   • Cramping     LLQ       HPI  Sandra Pretty is a 67 y.o. female who presents complaining of abdominal pain. She's had this for 2 weeks. The left upper quadrant to left lower quadrant. It sounds like she had a CT at [facility] which was nondiagnostic. Preceding this, the patient had cholecystectomy from what sounds to be gallstone pancreatitis. She's had no appetite and has been able to eat or drink. She's had however no change in bowel or bladder. No fever. No cough or cold symptoms. She didn't taking medicine which is not really helping. The last was earlier this morning. There is no other complaint.    Records from [facility] are reviewed, her last urine. That time they point out that she had a cholecystectomy on 4:15. She is complaining of the same pain that she has now. Hematocrit was 33. Lipase level is elevated at 612. ACL tear were normal. An x-ray showed constipation. CT scan showed possibly no severe pancreatitis.    PAST MEDICAL HISTORY  Past Medical History   Diagnosis Date   • Diabetes        FAMILY HISTORY  No family history on file.    SOCIAL HISTORY  Social History   Substance Use Topics   • Smoking status: Never Smoker    • Smokeless tobacco: Not on file   • Alcohol Use: No     She is here with her daughter and .    SURGICAL HISTORY  Past Surgical History   Procedure Laterality Date   • Breast biopsy       benign left biopsy in 2005   • Us-needle core bx-breast panel         CURRENT MEDICATIONS    I have reviewed the nurses notes and/or the list brought with the patient.    ALLERGIES  No Known Allergies    REVIEW OF SYSTEMS  See HPI for further details. Review of systems as above, otherwise all other systems are negative.     PHYSICAL EXAM  VITAL SIGNS: /64 mmHg  Pulse 73  Temp(Src) 36.6 °C (97.9 °F) (Temporal)  Resp 12  Ht 1.575 m (5' 2.01\")  " Wt 65.9 kg (145 lb 4.5 oz)  BMI 26.57 kg/m2  SpO2 98%  Constitutional: 5 otherwise well-appearing patient in no acute distress.  Not toxic, nor ill in appearance.  HENT: Mucus membranes moist.  Oropharynx is clear.  Eyes: No icterus. There is old left eye trauma.  Neck: Full nontender range of motion.  Lymphatic: No cervical lymphadenopathy noted.   Cardiovascular: Regular heart rate and rhythm.  No murmurs, rubs, nor gallop appreciated.   Thorax & Lungs: Chest is nontender.  Lungs are clear to auscultation with good air movement bilaterally.  No wheeze, rhonchi, nor rales.   Abdomen: Soft, with mild left upper quadrant tenderness. However no rebound nor guarding.  No mass, pulsatile mass, nor hepatosplenomegaly appreciated. There is no Muller sign.  Skin: No purpura nor petechia noted.  Extremities/Musculoskeletal: No sign of trauma.  Calves are nontender with no cords nor edema.  No Danielle's sign.  Pulses are intact all around.   Neurologic: Alert & oriented.  Strength and sensation is intact all around.  Gait is normal.  Psychiatric: Normal affect appropriate for the clinical situation.    LABS  Labs Reviewed   CBC WITH DIFFERENTIAL - Abnormal; Notable for the following:     RBC 3.43 (*)     Hemoglobin 11.6 (*)     Hematocrit 33.1 (*)     MCH 33.8 (*)     All other components within normal limits   COMP METABOLIC PANEL - Abnormal; Notable for the following:     Glucose 177 (*)     Bun 25 (*)     Alkaline Phosphatase 116 (*)     Globulin 4.0 (*)     All other components within normal limits   LIPASE - Abnormal; Notable for the following:     Lipase 125 (*)     All other components within normal limits   URINALYSIS,CULTURE IF INDICATED - Abnormal; Notable for the following:     Protein 300 (*)     Occult Blood Small (*)     All other components within normal limits   ESTIMATED GFR - Abnormal; Notable for the following:     GFR If Non  52 (*)     All other components within normal limits   URINE  MICROSCOPIC (W/UA) - Abnormal; Notable for the following:     RBC 2-5 (*)     All other components within normal limits   TROPONIN         RADIOLOGY/PROCEDURES  I have reviewed the patient's film interpretations myself, and they are read out by the radiologist as:   CT-ABDOMEN-PELVIS WITH   Final Result      No evidence of acute abdominal or pelvic pathology.      Aortic atherosclerosis without aneurysm.      Pancreas calcification suggesting history of chronic pancreatitis.      Old granulomatous disease.        .    MEDICAL RECORD  I have reviewed patient's medical record and pertinent results are listed above.    COURSE & MEDICAL DECISION MAKING  I have reviewed any medical record information, laboratory studies and radiographic results as noted above.  Patient presents with ongoing left upper quadrant pain, negative workup a few weeks ago. We did go ahead and do laboratories here. There is no evidence of leukocytosis or shift. She does have a mild anemia which is stable from last month. There is no evidence of hepatitis. Her lipase is mildly elevated. We went ahead and proceeded with a CT scan which shows some pancreatic calcification consistent with pancreatitis chronically. There is no evidence of inflammatory changes or other acute pathology. Patient was given a small dose of morphine and Zofran. Given IV fluids. She feels much better after this. I do wonder if she suffering from a chronic hepatitis. At this point, I discussed the patient's case with Dr. Black from GI. He thinks it's reasonable to go ahead and start the patient on some pancreatic enzymes, this was written for. We'll go ahead and write her for a few tablets of Percocet but recommended occasional Tylenol primarily for pain. Zofran for nausea. I have asked her to call tomorrow she is already heard from the office to establish follow-up. In the meantime, she should return here should she have any worsening symptoms at all, fever or any turn for  the worse. She should also follow with her personal doctor as well of course for ongoing management    FINAL IMPRESSION  1. Chronic pancreatitis, unspecified pancreatitis type (CMS-HCC)      2. Diabetes     This dictation was created using voice recognition software.    Electronically signed by: Mina Matias, 7/5/2017 9:55 AM

## 2017-07-05 NOTE — ED AVS SNAPSHOT
Home Care Instructions                                                                                                                Sandra Pretty   MRN: [id number]    Department:  [facility], Emergency Dept   Date of Visit:  7/5/2017            [facility], Emergency Dept    [address]    [place] [address]    Phone:  [phone]      You were seen by     Mina Matias M.D.      Your Diagnosis Was     Chronic pancreatitis, unspecified pancreatitis type (CMS-HCC)     K86.1       These are the medications you received during your hospitalization from 07/05/2017 0847 to 07/05/2017 1410     Date/Time Order Dose Route Action    07/05/2017 1117 NS infusion   Intravenous New Bag    07/05/2017 1009 morphine (pf) 4 mg/ml injection 2 mg 2 mg Intravenous Given    07/05/2017 1009 ondansetron (ZOFRAN) syringe/vial injection 4 mg 4 mg Intravenous Given    07/05/2017 1009 NS infusion 1,000 mL 1,000 mL Intravenous New Bag    07/05/2017 1215 iohexol (OMNIPAQUE) 350 mg/mL 80 mL Intravenous Given      Follow-up Information     1. Follow up with Jamilah Hagan N.P..    Specialty:  Family Medicine    Contact information    [address] [place] [address] [phone]          2. Schedule an appointment as soon as possible for a visit with Mayur Black M.D..    Specialty:  Gastroenterology    Contact information    [address]  [place] [address] [phone]        Medication Information     Review all of your home medications and newly ordered medications with your primary doctor and/or pharmacist as soon as possible. Follow medication instructions as directed by your doctor and/or pharmacist.     Please keep your complete medication list with you and share with your physician. Update the information when medications are discontinued, doses are changed, or new medications (including over-the-counter products) are added; and carry medication information at all  times in the event of emergency situations.               Medication List      START taking these medications        Instructions    Morning Afternoon Evening Bedtime    ondansetron 4 MG Tbdp   Commonly known as:  ZOFRAN ODT        Take 1 Tab by mouth every 8 hours as needed.   Dose:  4 mg                        oxycodone-acetaminophen 5-325 MG Tabs   Commonly known as:  PERCOCET        Take 1 Tab by mouth every 6 hours as needed (as needed for pain).   Dose:  1 Tab                        pancrelipase (Lip-Prot-Amyl) 11210 UNITS Cpep   Commonly known as:  PROTEAZE        Take 1 Cap by mouth 3 times a day, with meals.   Dose:  1 Cap                          ASK your doctor about these medications        Instructions    Morning Afternoon Evening Bedtime    amlodipine 10 MG Tabs   Commonly known as:  NORVASC        Take 10 mg by mouth every day.   Dose:  10 mg                        glyBURIDE 5 MG Tabs   Commonly known as:  DIABETA        Take 5 mg by mouth every morning with breakfast.   Dose:  5 mg                        hydrocodone-acetaminophen 5-500 MG Tabs   Commonly known as:  VICODIN        Take 1-2 Tabs by mouth every four hours as needed for 20 doses.   Dose:  1-2 Tab                        LANCETS MICRO THIN 33G Misc        by Does not apply route.                        levothyroxine 125 MCG Tabs   Commonly known as:  SYNTHROID        Take 125 mcg by mouth every day.   Dose:  125 mcg                        metformin 500 MG Tabs   Commonly known as:  GLUCOPHAGE        Take 500 mg by mouth. 3 tabs in the am 2 tabs in the evening   Dose:  500 mg                        pravastatin 10 MG Tabs   Commonly known as:  PRAVACHOL        Take 10 mg by mouth every evening.   Dose:  10 mg                        ranitidine 150 MG Tabs   Commonly known as:  ZANTAC        Take 150 mg by mouth 2 times a day.   Dose:  150 mg                        sertraline 50 MG Tabs   Commonly known as:  ZOLOFT        Take 50 mg by mouth  "every day.   Dose:  50 mg                             Where to Get Your Medications      You can get these medications from any pharmacy     Bring a paper prescription for each of these medications    - ondansetron 4 MG Tbdp  - oxycodone-acetaminophen 5-325 MG Tabs  - pancrelipase (Lip-Prot-Amyl) 27153 UNITS Cpep            Procedures and tests performed during your visit     CBC WITH DIFFERENTIAL    COMP METABOLIC PANEL    CONSENT FOR CONTRAST INJECTION    CT-ABDOMEN-PELVIS WITH    ESTIMATED GFR    IV Saline Lock    LIPASE    TROPONIN    URINALYSIS CULTURE, IF INDICATED    URINE MICROSCOPIC (W/UA)        Discharge Instructions       Low-Fat Diet for Pancreatitis or Gallbladder Conditions    I am concerned that your pancreas is continuing to give you problems.  For now, plenty of fluids.  Start Pancrealipase.  Occasional Tylenol (preferred) or a Percocet for continued pain.  Call in the morning if you have not already heard from Dr Black--I would like you seen ASAP.  Return to the ER for any worsening symptoms or turn for the worse.    A low-fat diet can be helpful if you have pancreatitis or a gallbladder condition. With these conditions, your pancreas and gallbladder have trouble digesting fats. A healthy eating plan with less fat will help rest your pancreas and gallbladder and reduce your symptoms.  WHAT DO I NEED TO KNOW ABOUT THIS DIET?  · Eat a low-fat diet.  ¨ Reduce your fat intake to less than 20-30% of your total daily calories. This is less than 50-60 g of fat per day.  ¨ Remember that you need some fat in your diet. Ask your dietician what your daily goal should be.  ¨ Choose nonfat and low-fat healthy foods. Look for the words \"nonfat,\" \"low fat,\" or \"fat free.\"  ¨ As a guide, look on the label and choose foods with less than 3 g of fat per serving. Eat only one serving.  · Avoid alcohol.  · Do not smoke. If you need help quitting, talk with your health care provider.  · Eat small frequent meals " instead of three large heavy meals.  WHAT FOODS CAN I EAT?  Grains  Include healthy grains and starches such as potatoes, wheat bread, fiber-rich cereal, and brown rice. Choose whole grain options whenever possible. In adults, whole grains should account for 45-65% of your daily calories.   Fruits and Vegetables  Eat plenty of fruits and vegetables. Fresh fruits and vegetables add fiber to your diet.  Meats and Other Protein Sources  Eat lean meat such as chicken and pork. Trim any fat off of meat before cooking it. Eggs, fish, and beans are other sources of protein. In adults, these foods should account for 10-35% of your daily calories.  Dairy  Choose low-fat milk and dairy options. Dairy includes fat and protein, as well as calcium.   Fats and Oils  Limit high-fat foods such as fried foods, sweets, baked goods, sugary drinks.   Other  Creamy sauces and condiments, such as mayonnaise, can add extra fat. Think about whether or not you need to use them, or use smaller amounts or low fat options.  WHAT FOODS ARE NOT RECOMMENDED?  · High fat foods, such as:  ¨ Baked goods.  ¨ Ice cream.  ¨ Maur[place] toast.  ¨ Sweet rolls.  ¨ Pizza.  ¨ Cheese bread.  ¨ Foods covered with batter, butter, creamy sauces, or cheese.  ¨ Fried foods.  ¨ Sugary drinks and desserts.  · Foods that cause gas or bloating     This information is not intended to replace advice given to you by your health care provider. Make sure you discuss any questions you have with your health care provider.     Document Released: 12/23/2014 Document Reviewed: 12/23/2014  [employer] Interactive Patient Education ©2016 [employer]    Dieta con bajo contenido de grasas para la pancreatitis o los trastornos de la vesícula  (Low-Fat Diet for Pancreatitis or Gallbladder Conditions)  Nelli dieta con bajo contenido de grasas puede ser útil si usted tiene pancreatitis o trastornos de la vesícula. En estos trastornos, el páncreas y la vesícula tienen dificultades para digerir  "las grasas. Un plan de alimentación saludable con menos grasas ayudará a que el páncreas y la vesícula descansen, y reducirá los síntomas.  ¿QUÉ VON SABER ACERCA DE ESTA DIETA?  · Consuma mara dieta con bajo contenido de grasas.  ¨ Reduzca el consumo de grasas a menos del 20 % al 30 % del total de calorías diarias. [place] representa menos de 50 a 60 gramos de grasas por día.  ¨ Recuerde que la dieta debe incluir algo de grasa. Consulte al nutricionista cuál debe ser cardona meta diaria.  ¨ Elija alimentos saludables sin grasas o con bajo contenido de grasas. Busque las palabras \"sin grasa\", \"bajo en grasas\" o \"bajo contenido de grasas\".  ¨ Gladys guía, dianne la etiqueta y elija alimentos con menos de 3 gramos de grasa por porción. Coma solo mara porción.  · Evite el alcohol.  · No fumar. Si necesita ayuda para dejar de fumar, hable con el médico.  · Kamla comidas pequeñas y frecuentes en lugar de 3 comidas abundantes y pesadas.  ¿QUÉ ALIMENTOS PUEDO COMER?  Cereales  Incluya granos y almidones saludables, gladys bora, pan integral, cereales ricos en fibras y arroz integral. Elija opciones de cereales integrales siempre que sea posible. En los adultos, los cereales integrales deben representar del 45 % al 65 % de las calorías diarias.   Frutas y verduras  Coma muchas frutas y verduras. Las frutas y verduras frescas aportan fibra a la dieta.  Yasmani y otras macias de proteínas  Coma yasmani magras, gladys leonid y cerdo. Quite la grasa de la carne antes de cocinarla. Los huevos, el pescado y los frijoles son otras macias de proteínas. En los adultos, estos alimentos deben representar entre el 10 % y el 35 % de las calorías diarias.  Lácteos  Elija leche y otros productos lácteos con bajo contenido de grasas. Los lácteos aportan grasas y proteínas, además de calcio.   Grasas y aceites  Limite los alimentos con alto contenido de grasas, gladys las comidas fritas, los dulces, los productos horneados y las bebidas azucaradas.   Otros  Los " condimentos y las salsas cremosas, ashely la mayonesa, pueden aportar grasa adicional. Piense si necesita usarlos o no, o use pequeñas cantidades u opciones con bajo contenido de grasas.  ¿QUÉ ALIMENTOS NO ESTÁN RECOMENDADOS?  · Los alimentos con alto contenido de grasas, por ejemplo:  ¨ Alimentos horneados.  ¨ Helados.  ¨ Tostadas francesas.  ¨ Panecillos dulces.  ¨ Pizza.  ¨ Pan de queso.  ¨ Alimentos rebozados o cubiertos con mantequilla, salsas cremosas o queso.  ¨ Comidas fritas.  ¨ Postres y bebidas azucaradas.  · Alimentos que causan gases o meteorismo     Esta información no tiene ashely fin reemplazar el consejo del médico. Asegúrese de hacerle al médico cualquier pregunta que tenga.     Document Released: 12/23/2014  [employer] Interactive Patient Education ©2016 [employer]            Patient Information     Patient Information    Following emergency treatment: all patient requiring follow-up care must return either to a private physician or a clinic if your condition worsens before you are able to obtain further medical attention, please return to the emergency room.     Billing Information    At [facility], we work to make the billing process streamlined for our patients.  Our Representatives are here to answer any questions you may have regarding your hospital bill.  If you have insurance coverage and have supplied your insurance information to us, we will submit a claim to your insurer on your behalf.  Should you have any questions regarding your bill, we can be reached online or by phone as follows:  Online: You are able pay your bills online or live chat with our representatives about any billing questions you may have. We are here to help Monday - Friday from 8:00am to 7:30pm and 9:00am - 12:00pm on Saturdays.  Please visit [email or web address]  for more information.   Phone:  [phone] or [phone]    Please note that your emergency physician, surgeon,  pathologist, radiologist, anesthesiologist, and other specialists are not employed by [facility] and will therefore bill separately for their services.  Please contact them directly for any questions concerning their bills at the numbers below:     Emergency Physician Services:  [phone]  [facility]:  [phone]  [facility]:  [phone]  [facility]:  [phone]    1. Your final bill may vary from the amount quoted upon discharge if all procedures are not complete at that time, or if your doctor has additional procedures of which we are not aware. You will receive an additional bill if you return to the Emergency Department at [facility] for suture removal regardless of the facility of which the sutures were placed.     2. Please arrange for settlement of this account at the emergency registration.    3. All self-pay accounts are due in full at the time of treatment.  If you are unable to meet this obligation then payment is expected within 4-5 days.     4. If you have had radiology studies (CT, X-ray, Ultrasound, MRI), you have received a preliminary result during your emergency department visit. Please contact the radiology department [phone] to receive a copy of your final result. Please discuss the Final result with your primary physician or with the follow up physician provided.     Crisis Hotline:  [place] Crisis Hotline:  8-653-LQIQKGJ or 1-245.980.6568  [place] Crisis Hotline:    1-394.603.1249 or 393-530-2283         ED Discharge Follow Up Questions    1. In order to provide you with very good care, we would like to follow up with a phone call in the next few days.  May we have your permission to contact you?     YES /  NO    2. What is the best phone number to call you? (       )_____-__________    3. What is the best time to call you?      Morning  /  Afternoon  /  Evening                   Patient Signature:   ____________________________________________________________    Date:  ____________________________________________________________

## 2017-07-05 NOTE — ED NOTES
Pt admits readiness for discharge.  Patient given discharge instructions and verbalized understanding via the  IPAD.  Vital signs are stable.  Pt denies any further needs.

## 2017-07-05 NOTE — ED AVS SNAPSHOT
7/5/2017    Sandra Francois Pretty  [address]  [place] [address]    Dear Sandra:    [facility] wants to ensure your discharge home is safe and you or your loved ones have had all of your questions answered regarding your care after you leave the hospital.    Below is a list of resources and contact information should you have any questions regarding your hospital stay, follow-up instructions, or active medical symptoms.    Questions or Concerns Regarding… Contact   Medical Questions Related to Your Discharge  (7 days a week, 8am-5pm) Contact a Nurse Care Coordinator   [phone]   Medical Questions Not Related to Your Discharge  (24 hours a day / 7 days a week)  Contact the Nurse Health Line   [phone]    Medications or Discharge Instructions Refer to your discharge packet   or contact your [facility] Primary Care Provider   [phone]   Follow-up Appointment(s) Schedule your appointment via [employer]   or contact Scheduling [phone]   Billing Review your statement via [employer]  or contact Billing [phone]   Medical Records Review your records via [employer]   or contact Medical Records [phone]     You may receive a telephone call within two days of discharge. This call is to make certain you understand your discharge instructions and have the opportunity to have any questions answered. You can also easily access your medical information, test results and upcoming appointments via the [employer] free online health management tool. You can learn more and sign up at [email or web address]. For assistance setting up your [employer] account, please call [phone].    Once again, we want to ensure your discharge home is safe and that you have a clear understanding of any next steps in your care. If you have any questions or concerns, please do not hesitate to contact us, we are here for you. Thank you for choosing [facility] for your healthcare needs.    Sincerely,    Your [facility] Healthcare Team

## 2017-07-05 NOTE — DISCHARGE INSTRUCTIONS
"Low-Fat Diet for Pancreatitis or Gallbladder Conditions    I am concerned that your pancreas is continuing to give you problems.  For now, plenty of fluids.  Start Pancrealipase.  Occasional Tylenol (preferred) or a Percocet for continued pain.  Call in the morning if you have not already heard from Dr Black--I would like you seen ASAP.  Return to the ER for any worsening symptoms or turn for the worse.    A low-fat diet can be helpful if you have pancreatitis or a gallbladder condition. With these conditions, your pancreas and gallbladder have trouble digesting fats. A healthy eating plan with less fat will help rest your pancreas and gallbladder and reduce your symptoms.  WHAT DO I NEED TO KNOW ABOUT THIS DIET?  · Eat a low-fat diet.  ¨ Reduce your fat intake to less than 20-30% of your total daily calories. This is less than 50-60 g of fat per day.  ¨ Remember that you need some fat in your diet. Ask your dietician what your daily goal should be.  ¨ Choose nonfat and low-fat healthy foods. Look for the words \"nonfat,\" \"low fat,\" or \"fat free.\"  ¨ As a guide, look on the label and choose foods with less than 3 g of fat per serving. Eat only one serving.  · Avoid alcohol.  · Do not smoke. If you need help quitting, talk with your health care provider.  · Eat small frequent meals instead of three large heavy meals.  WHAT FOODS CAN I EAT?  Grains  Include healthy grains and starches such as potatoes, wheat bread, fiber-rich cereal, and brown rice. Choose whole grain options whenever possible. In adults, whole grains should account for 45-65% of your daily calories.   Fruits and Vegetables  Eat plenty of fruits and vegetables. Fresh fruits and vegetables add fiber to your diet.  Meats and Other Protein Sources  Eat lean meat such as chicken and pork. Trim any fat off of meat before cooking it. Eggs, fish, and beans are other sources of protein. In adults, these foods should account for 10-35% of your daily " "calories.  Dairy  Choose low-fat milk and dairy options. Dairy includes fat and protein, as well as calcium.   Fats and Oils  Limit high-fat foods such as fried foods, sweets, baked goods, sugary drinks.   Other  Creamy sauces and condiments, such as mayonnaise, can add extra fat. Think about whether or not you need to use them, or use smaller amounts or low fat options.  WHAT FOODS ARE NOT RECOMMENDED?  · High fat foods, such as:  ¨ Baked goods.  ¨ Ice cream.  ¨ [place] toast.  ¨ Sweet rolls.  ¨ Pizza.  ¨ Cheese bread.  ¨ Foods covered with batter, butter, creamy sauces, or cheese.  ¨ Fried foods.  ¨ Sugary drinks and desserts.  · Foods that cause gas or bloating     This information is not intended to replace advice given to you by your health care provider. Make sure you discuss any questions you have with your health care provider.     Document Released: 12/23/2014 Document Reviewed: 12/23/2014  [employer] Interactive Patient Education ©2016 [employer] Inc.    Dieta con bajo contenido de grasas para la pancreatitis o los trastornos de la vesícula  (Low-Fat Diet for Pancreatitis or Gallbladder Conditions)  Nelli dieta con bajo contenido de grasas puede ser útil si usted tiene pancreatitis o trastornos de la vesícula. En estos trastornos, el páncreas y la vesícula tienen dificultades para digerir las grasas. Un plan de alimentación saludable con menos grasas ayudará a que el páncreas y la vesícula descansen, y reducirá los síntomas.  ¿QUÉ VON SABER ACERCA DE ESTA DIETA?  · Consuma nelli dieta con bajo contenido de grasas.  ¨ Reduzca el consumo de grasas a menos del 20 % al 30 % del total de calorías diarias. [place] representa menos de 50 a 60 gramos de grasas por día.  ¨ Recuerde que la dieta debe incluir algo de grasa. Consulte al nutricionista cuál debe ser cardona meta diaria.  ¨ Elija alimentos saludables sin grasas o con bajo contenido de grasas. Busque las palabras \"sin grasa\", \"bajo en grasas\" o \"bajo contenido de " "grasas\".  ¨ [place] guía, dianne la etiqueta y elija alimentos con menos de 3 gramos de grasa por porción. Coma solo mara porción.  · Evite el alcohol.  · No fumar. Si necesita ayuda para dejar de fumar, hable con el médico.  · Kamla comidas pequeñas y frecuentes en lugar de 3 comidas abundantes y pesadas.  ¿QUÉ ALIMENTOS PUEDO COMER?  Cereales  Incluya granos y almidones saludables, ashely bora, pan integral, cereales ricos en fibras y arroz integral. Elija opciones de cereales integrales siempre que sea posible. En los adultos, los cereales integrales deben representar del 45 % al 65 % de las calorías diarias.   Frutas y verduras  Coma muchas frutas y verduras. Las frutas y verduras frescas aportan fibra a la dieta.  Yasmani y otras macias de proteínas  Coma yasmani magras, ashely leonid y cerdo. Quite la grasa de la carne antes de cocinarla. Los huevos, el pescado y los frijoles son otras macias de proteínas. En los adultos, estos alimentos deben representar entre el 10 % y el 35 % de las calorías diarias.  Lácteos  Elija leche y otros productos lácteos con bajo contenido de grasas. Los lácteos aportan grasas y proteínas, además de calcio.   Grasas y aceites  Limite los alimentos con alto contenido de grasas, ashely las comidas fritas, los dulces, los productos horneados y las bebidas azucaradas.   Otros  Los condimentos y las salsas cremosas, ashely la mayonesa, pueden aportar grasa adicional. Piense si necesita usarlos o no, o use pequeñas cantidades u opciones con bajo contenido de grasas.  ¿QUÉ ALIMENTOS NO ESTÁN RECOMENDADOS?  · Los alimentos con alto contenido de grasas, por ejemplo:  ¨ Alimentos horneados.  ¨ Helados.  ¨ Tostadas francesas.  ¨ Panecillos dulces.  ¨ Pizza.  ¨ Pan de queso.  ¨ Alimentos rebozados o cubiertos con mantequilla, salsas cremosas o queso.  ¨ Comidas fritas.  ¨ Postres y bebidas azucaradas.  · Alimentos que causan gases o meteorismo     Esta información no tiene ashely fin reemplazar el consejo del " médico. Asegúrese de hacerle al médico cualquier pregunta que tenga.     Document Released: 12/23/2014  Elsevier Interactive Patient Education ©2016 Elsevier Inc.

## 2017-07-05 NOTE — DISCHARGE PLANNING
Pt unable to get appt with Dr Black until October. Referral faxed (with confirmation) to [phone]. Daughter, Heidy, notified and will call office tomorrow morning.

## 2017-07-27 ENCOUNTER — HOSPITAL ENCOUNTER (OUTPATIENT)
Dept: LAB | Facility: MEDICAL CENTER | Age: 67
End: 2017-07-27
Attending: INTERNAL MEDICINE
Payer: MEDICARE

## 2017-07-27 LAB
BASOPHILS # BLD AUTO: 0.3 % (ref 0–1.8)
BASOPHILS # BLD: 0.02 K/UL (ref 0–0.12)
EOSINOPHIL # BLD AUTO: 0.06 K/UL (ref 0–0.51)
EOSINOPHIL NFR BLD: 1 % (ref 0–6.9)
ERYTHROCYTE [DISTWIDTH] IN BLOOD BY AUTOMATED COUNT: 45.2 FL (ref 35.9–50)
HCT VFR BLD AUTO: 33.2 % (ref 37–47)
HGB BLD-MCNC: 11.2 G/DL (ref 12–16)
IMM GRANULOCYTES # BLD AUTO: 0.02 K/UL (ref 0–0.11)
IMM GRANULOCYTES NFR BLD AUTO: 0.3 % (ref 0–0.9)
INR PPP: 1.05 (ref 0.87–1.13)
LYMPHOCYTES # BLD AUTO: 1.66 K/UL (ref 1–4.8)
LYMPHOCYTES NFR BLD: 26.6 % (ref 22–41)
MCH RBC QN AUTO: 33 PG (ref 27–33)
MCHC RBC AUTO-ENTMCNC: 33.7 G/DL (ref 33.6–35)
MCV RBC AUTO: 97.9 FL (ref 81.4–97.8)
MONOCYTES # BLD AUTO: 0.35 K/UL (ref 0–0.85)
MONOCYTES NFR BLD AUTO: 5.6 % (ref 0–13.4)
NEUTROPHILS # BLD AUTO: 4.14 K/UL (ref 2–7.15)
NEUTROPHILS NFR BLD: 66.2 % (ref 44–72)
NRBC # BLD AUTO: 0 K/UL
NRBC BLD AUTO-RTO: 0 /100 WBC
PLATELET # BLD AUTO: 197 K/UL (ref 164–446)
PMV BLD AUTO: 10.4 FL (ref 9–12.9)
PROTHROMBIN TIME: 14 SEC (ref 12–14.6)
RBC # BLD AUTO: 3.39 M/UL (ref 4.2–5.4)
WBC # BLD AUTO: 6.3 K/UL (ref 4.8–10.8)

## 2017-07-27 PROCEDURE — 85610 PROTHROMBIN TIME: CPT | Mod: GA

## 2017-07-27 PROCEDURE — 36415 COLL VENOUS BLD VENIPUNCTURE: CPT

## 2017-07-27 PROCEDURE — 85025 COMPLETE CBC W/AUTO DIFF WBC: CPT

## 2017-07-27 PROCEDURE — 82787 IGG 1 2 3 OR 4 EACH: CPT

## 2017-07-27 PROCEDURE — 82784 ASSAY IGA/IGD/IGG/IGM EACH: CPT

## 2017-07-29 LAB
IGG SERPL-MCNC: 1940 MG/DL (ref 768–1632)
TEST NAME 95000: NORMAL

## 2017-08-07 ENCOUNTER — HOSPITAL ENCOUNTER (OUTPATIENT)
Dept: RADIOLOGY | Facility: MEDICAL CENTER | Age: 67
End: 2017-08-07
Attending: INTERNAL MEDICINE
Payer: MEDICARE

## 2017-08-07 DIAGNOSIS — K80.80 BILIARY CALCULUS OF OTHER SITE WITHOUT OBSTRUCTION: ICD-10-CM

## 2017-08-07 PROCEDURE — 76700 US EXAM ABDOM COMPLETE: CPT

## 2017-08-17 ENCOUNTER — HOSPITAL ENCOUNTER (OUTPATIENT)
Dept: LAB | Facility: MEDICAL CENTER | Age: 67
End: 2017-08-17
Attending: INTERNAL MEDICINE
Payer: MEDICARE

## 2017-08-17 LAB
BASOPHILS # BLD AUTO: 0.4 % (ref 0–1.8)
BASOPHILS # BLD: 0.03 K/UL (ref 0–0.12)
EOSINOPHIL # BLD AUTO: 0.05 K/UL (ref 0–0.51)
EOSINOPHIL NFR BLD: 0.7 % (ref 0–6.9)
ERYTHROCYTE [DISTWIDTH] IN BLOOD BY AUTOMATED COUNT: 48.1 FL (ref 35.9–50)
HCT VFR BLD AUTO: 32.1 % (ref 37–47)
HGB BLD-MCNC: 11.3 G/DL (ref 12–16)
IMM GRANULOCYTES # BLD AUTO: 0.02 K/UL (ref 0–0.11)
IMM GRANULOCYTES NFR BLD AUTO: 0.3 % (ref 0–0.9)
INR PPP: 0.98 (ref 0.87–1.13)
LYMPHOCYTES # BLD AUTO: 2.02 K/UL (ref 1–4.8)
LYMPHOCYTES NFR BLD: 29 % (ref 22–41)
MCH RBC QN AUTO: 34.9 PG (ref 27–33)
MCHC RBC AUTO-ENTMCNC: 35.2 G/DL (ref 33.6–35)
MCV RBC AUTO: 99.1 FL (ref 81.4–97.8)
MONOCYTES # BLD AUTO: 0.38 K/UL (ref 0–0.85)
MONOCYTES NFR BLD AUTO: 5.5 % (ref 0–13.4)
NEUTROPHILS # BLD AUTO: 4.46 K/UL (ref 2–7.15)
NEUTROPHILS NFR BLD: 64.1 % (ref 44–72)
NRBC # BLD AUTO: 0 K/UL
NRBC BLD AUTO-RTO: 0 /100 WBC
PLATELET # BLD AUTO: 189 K/UL (ref 164–446)
PMV BLD AUTO: 10.7 FL (ref 9–12.9)
PROTHROMBIN TIME: 13.3 SEC (ref 12–14.6)
RBC # BLD AUTO: 3.24 M/UL (ref 4.2–5.4)
WBC # BLD AUTO: 7 K/UL (ref 4.8–10.8)

## 2017-08-17 PROCEDURE — 36415 COLL VENOUS BLD VENIPUNCTURE: CPT

## 2017-08-17 PROCEDURE — 85025 COMPLETE CBC W/AUTO DIFF WBC: CPT

## 2017-08-17 PROCEDURE — 85610 PROTHROMBIN TIME: CPT

## 2017-10-25 ENCOUNTER — HOSPITAL ENCOUNTER (OUTPATIENT)
Dept: LAB | Facility: MEDICAL CENTER | Age: 67
End: 2017-10-25
Attending: INTERNAL MEDICINE
Payer: MEDICARE

## 2017-10-25 LAB
ALBUMIN SERPL BCP-MCNC: 3.8 G/DL (ref 3.2–4.9)
BUN SERPL-MCNC: 27 MG/DL (ref 8–22)
CALCIUM SERPL-MCNC: 9.3 MG/DL (ref 8.5–10.5)
CHLORIDE SERPL-SCNC: 105 MMOL/L (ref 96–112)
CO2 SERPL-SCNC: 25 MMOL/L (ref 20–33)
CREAT SERPL-MCNC: 1.24 MG/DL (ref 0.5–1.4)
CREAT UR-MCNC: 170.6 MG/DL
GFR SERPL CREATININE-BSD FRML MDRD: 43 ML/MIN/1.73 M 2
GLUCOSE SERPL-MCNC: 279 MG/DL (ref 65–99)
PHOSPHATE SERPL-MCNC: 3.1 MG/DL (ref 2.5–4.5)
POTASSIUM SERPL-SCNC: 4.6 MMOL/L (ref 3.6–5.5)
PROT UR-MCNC: 657.8 MG/DL (ref 0–15)
PROT/CREAT UR: 3856 MG/G (ref 10–107)
SODIUM SERPL-SCNC: 136 MMOL/L (ref 135–145)

## 2017-10-25 PROCEDURE — 80069 RENAL FUNCTION PANEL: CPT

## 2017-10-25 PROCEDURE — 36415 COLL VENOUS BLD VENIPUNCTURE: CPT

## 2017-10-25 PROCEDURE — 84156 ASSAY OF PROTEIN URINE: CPT

## 2017-10-25 PROCEDURE — 82570 ASSAY OF URINE CREATININE: CPT

## 2018-02-21 ENCOUNTER — OFFICE VISIT (OUTPATIENT)
Dept: URGENT CARE | Facility: PHYSICIAN GROUP | Age: 68
End: 2018-02-21
Payer: MEDICARE

## 2018-02-21 VITALS
WEIGHT: 144 LBS | HEIGHT: 62 IN | TEMPERATURE: 98.7 F | OXYGEN SATURATION: 99 % | SYSTOLIC BLOOD PRESSURE: 134 MMHG | DIASTOLIC BLOOD PRESSURE: 76 MMHG | BODY MASS INDEX: 26.5 KG/M2 | HEART RATE: 98 BPM

## 2018-02-21 DIAGNOSIS — J06.9 VIRAL URI: ICD-10-CM

## 2018-02-21 DIAGNOSIS — J02.9 SORE THROAT: ICD-10-CM

## 2018-02-21 LAB
INT CON NEG: NEGATIVE
INT CON POS: POSITIVE
S PYO AG THROAT QL: NEGATIVE

## 2018-02-21 PROCEDURE — 99203 OFFICE O/P NEW LOW 30 MIN: CPT | Performed by: NURSE PRACTITIONER

## 2018-02-21 PROCEDURE — 87880 STREP A ASSAY W/OPTIC: CPT | Performed by: NURSE PRACTITIONER

## 2018-02-21 ASSESSMENT — ENCOUNTER SYMPTOMS
TROUBLE SWALLOWING: 1
SORE THROAT: 1
CHILLS: 0
HEADACHES: 1
FEVER: 0

## 2018-02-21 NOTE — PROGRESS NOTES
"Subjective:      Sandra Pretty is a 68 y.o. female who presents with Fever (chills, body aches, headache X 3 days )            Pharyngitis    This is a new problem. Episode onset: Pt BIB daughter who is also serving as interpretor. Daughter reports that her mom developed ST, mild congestion, headache and decreased appetite 3 days ago. Denies fever or vomiting. Progression since onset: Pt reports she thinks the sore throat pain is getting better today. The pain is worse on the left side. There has been no fever. The pain is mild. Associated symptoms include congestion, headaches and trouble swallowing. Associated symptoms comments: Pt reports a decreased appetite today, did not finish her lunch today. She has tried acetaminophen for the symptoms. The treatment provided mild relief.       Review of Systems   Constitutional: Negative for chills and fever.   HENT: Positive for congestion, sore throat and trouble swallowing.    Neurological: Positive for headaches.   All other systems reviewed and are negative.      Past Medical History:   Diagnosis Date   • Diabetes       Past Surgical History:   Procedure Laterality Date   • BREAST BIOPSY      benign left biopsy in 2005   • US-NEEDLE CORE BX-BREAST PANEL        Social History     Social History   • Marital status:      Spouse name: N/A   • Number of children: N/A   • Years of education: N/A     Occupational History   • Not on file.     Social History Main Topics   • Smoking status: Never Smoker   • Smokeless tobacco: Never Used   • Alcohol use No   • Drug use: No   • Sexual activity: Not on file     Other Topics Concern   • Not on file     Social History Narrative   • No narrative on file        Objective:     /76   Pulse 98   Temp 37.1 °C (98.7 °F)   Ht 1.575 m (5' 2.01\")   Wt 65.3 kg (144 lb)   SpO2 99%   BMI 26.33 kg/m²      Physical Exam   Constitutional: She is oriented to person, place, and time. Vital signs are normal. She appears " well-developed and well-nourished.   HENT:   Head: Normocephalic and atraumatic.   Right Ear: Tympanic membrane and external ear normal.   Left Ear: Tympanic membrane and external ear normal.   Nose: Rhinorrhea present.   Mouth/Throat: Posterior oropharyngeal erythema present.   Eyes: EOM are normal. Pupils are equal, round, and reactive to light.   Neck: Normal range of motion.   Cardiovascular: Normal rate and regular rhythm.    Pulmonary/Chest: Effort normal and breath sounds normal.   Musculoskeletal: Normal range of motion.   Lymphadenopathy:        Head (right side): Submandibular adenopathy present.        Head (left side): Submandibular adenopathy present.   Neurological: She is alert and oriented to person, place, and time.   Skin: Skin is warm and dry. Capillary refill takes less than 2 seconds.   Psychiatric: She has a normal mood and affect. Her speech is normal and behavior is normal. Thought content normal.   Vitals reviewed.              Assessment/Plan:     1. Sore throat  - POCT Rapid Strep A NEGATIVE    2. Viral URI    Discussed with patient and daughter this appears to be viral in nature. Continue home remedies, throat lozenges, warm salt water gargles  Advised pt to RTC in 3 days if no improvement or symptoms worsen  Pt and family understand and agree to POC  Supportive care, differential diagnoses, and indications for immediate follow-up discussed with patient.    Pathogenesis of diagnosis discussed including typical length and natural progression.      Instructed to return to  or nearest emergency department if symptoms fail to improve, for any change in condition, further concerns, or new concerning symptoms.  Patient states understanding of the plan of care and discharge instructions.

## 2018-02-23 ENCOUNTER — OFFICE VISIT (OUTPATIENT)
Dept: URGENT CARE | Facility: PHYSICIAN GROUP | Age: 68
End: 2018-02-23
Payer: MEDICARE

## 2018-02-23 VITALS
DIASTOLIC BLOOD PRESSURE: 60 MMHG | TEMPERATURE: 98.4 F | HEIGHT: 62 IN | WEIGHT: 145.4 LBS | HEART RATE: 85 BPM | BODY MASS INDEX: 26.76 KG/M2 | OXYGEN SATURATION: 98 % | SYSTOLIC BLOOD PRESSURE: 130 MMHG

## 2018-02-23 DIAGNOSIS — R05.3 PERSISTENT COUGH: Primary | ICD-10-CM

## 2018-02-23 PROCEDURE — 99214 OFFICE O/P EST MOD 30 MIN: CPT | Mod: 25 | Performed by: NURSE PRACTITIONER

## 2018-02-23 PROCEDURE — 94760 N-INVAS EAR/PLS OXIMETRY 1: CPT | Mod: 59 | Performed by: NURSE PRACTITIONER

## 2018-02-23 PROCEDURE — 94640 AIRWAY INHALATION TREATMENT: CPT | Performed by: NURSE PRACTITIONER

## 2018-02-23 RX ORDER — DOXYCYCLINE HYCLATE 100 MG/1
100 CAPSULE ORAL 2 TIMES DAILY
Qty: 14 CAP | Refills: 0 | Status: SHIPPED | OUTPATIENT
Start: 2018-02-23 | End: 2018-04-20

## 2018-02-23 RX ORDER — ALBUTEROL SULFATE 90 UG/1
2 AEROSOL, METERED RESPIRATORY (INHALATION) EVERY 4 HOURS PRN
Qty: 1 INHALER | Refills: 1 | Status: SHIPPED | OUTPATIENT
Start: 2018-02-23 | End: 2018-04-20

## 2018-02-23 RX ORDER — ALBUTEROL SULFATE 2.5 MG/3ML
2.5 SOLUTION RESPIRATORY (INHALATION) ONCE
Status: COMPLETED | OUTPATIENT
Start: 2018-02-23 | End: 2018-02-23

## 2018-02-23 RX ADMIN — ALBUTEROL SULFATE 2.5 MG: 2.5 SOLUTION RESPIRATORY (INHALATION) at 20:09

## 2018-02-23 ASSESSMENT — ENCOUNTER SYMPTOMS
DIARRHEA: 0
COUGH: 1
VOMITING: 0
SPUTUM PRODUCTION: 1
MYALGIAS: 0
WEAKNESS: 1
SHORTNESS OF BREATH: 0
NAUSEA: 0
CHILLS: 1
SORE THROAT: 1
FEVER: 1
RHINORRHEA: 1

## 2018-02-24 NOTE — PROGRESS NOTES
"Subjective:      Sandra Pretty is a 68 y.o. female who presents with Cough (Was seen here 2/21/18, not better, on bloody nose, sore throat, headache, fever, chills, x4 days )            Medications, Allergies and Prior Medical Hx reviewed and updated in [facility].with patient/family today       MA assisted with interpretation.       Cough   This is a new problem. The current episode started in the past 7 days (4 days). The problem has been unchanged. The cough is productive of sputum. Associated symptoms include chest pain (with cough only), chills, a fever, nasal congestion, rhinorrhea and a sore throat (with cough only). Pertinent negatives include no ear pain, myalgias or shortness of breath. The treatment provided no relief. There is no history of bronchitis or pneumonia.       Review of Systems   Constitutional: Positive for chills, fever and malaise/fatigue.   HENT: Positive for congestion, rhinorrhea and sore throat (with cough only). Negative for ear discharge and ear pain.    Respiratory: Positive for cough and sputum production. Negative for shortness of breath.    Cardiovascular: Positive for chest pain (with cough only).   Gastrointestinal: Negative for diarrhea, nausea and vomiting.   Musculoskeletal: Negative for myalgias.   Neurological: Positive for weakness.          Objective:     /60   Pulse 85   Temp 36.9 °C (98.4 °F)   Ht 1.575 m (5' 2.01\")   Wt 66 kg (145 lb 6.4 oz)   SpO2 97%   BMI 26.59 kg/m²      Physical Exam   Constitutional: She appears well-developed and well-nourished. No distress.   HENT:   Head: Normocephalic and atraumatic.   Right Ear: Tympanic membrane and ear canal normal.   Left Ear: Tympanic membrane and ear canal normal.   Nose: Rhinorrhea present.   Mouth/Throat: Uvula is midline and mucous membranes are normal. No trismus in the jaw. No uvula swelling. Posterior oropharyngeal edema and posterior oropharyngeal erythema present. No oropharyngeal exudate. "   Eyes: Conjunctivae are normal. Pupils are equal, round, and reactive to light.   Neck: Neck supple.   Cardiovascular: Normal rate, regular rhythm and normal heart sounds.    Pulmonary/Chest: Effort normal and breath sounds normal. No respiratory distress. She has no wheezes.   Lymphadenopathy:     She has cervical adenopathy.   Neurological: She is alert.   Skin: Skin is warm and dry.   Psychiatric: She has a normal mood and affect. Her behavior is normal.   Vitals reviewed.              Assessment/Plan:     1. Persistent cough  albuterol (PROVENTIL) 2.5mg/3ml nebulizer solution 2.5 mg    doxycycline (VIBRAMYCIN) 100 MG Cap    albuterol 108 (90 Base) MCG/ACT Aero Soln inhalation aerosol       In Clinic Medication : albuterol neb  I personally administered the ordered medication/vaccine with assistance of the MA  Pt states breathing is easier now.       Rest, Fluids, tylenol, ibuprofen, humidified air, and otc cough meds  Pt will go to the ER for worsening or changing symptoms as discussed, follow-up with your primary care provider or return here if not improving in 7 days   Discharge instructions discussed with pt/family who verbalize understanding and agreement with poc

## 2018-04-11 ENCOUNTER — OFFICE VISIT (OUTPATIENT)
Dept: URGENT CARE | Facility: CLINIC | Age: 68
End: 2018-04-11
Payer: MEDICARE

## 2018-04-11 VITALS
HEART RATE: 84 BPM | TEMPERATURE: 97.8 F | OXYGEN SATURATION: 97 % | SYSTOLIC BLOOD PRESSURE: 128 MMHG | BODY MASS INDEX: 26.68 KG/M2 | DIASTOLIC BLOOD PRESSURE: 76 MMHG | HEIGHT: 62 IN | WEIGHT: 145 LBS

## 2018-04-11 DIAGNOSIS — R60.0 PEDAL EDEMA: Primary | ICD-10-CM

## 2018-04-11 PROCEDURE — 99213 OFFICE O/P EST LOW 20 MIN: CPT | Performed by: PHYSICIAN ASSISTANT

## 2018-04-11 NOTE — PATIENT INSTRUCTIONS
Edema  Edema is an abnormal buildup of fluids. It is more common in your legs and thighs. Painless swelling of the feet and ankles is more likely as a person ages. It also is common in looser skin, like around your eyes.  Follow these instructions at home:  · Keep the affected body part above the level of the heart while lying down.  · Do not sit still or stand for a long time.  · Do not put anything right under your knees when you lie down.  · Do not wear tight clothes on your upper legs.  · Exercise your legs to help the puffiness (swelling) go down.  · Wear elastic bandages or support stockings as told by your doctor.  · A low-salt diet may help lessen the puffiness.  · Only take medicine as told by your doctor.  Contact a doctor if:  · Treatment is not working.  · You have heart, liver, or kidney disease and notice that your skin looks puffy or shiny.  · You have puffiness in your legs that does not get better when you raise your legs.  · You have sudden weight gain for no reason.  Get help right away if:  · You have shortness of breath or chest pain.  · You cannot breathe when you lie down.  · You have pain, redness, or warmth in the areas that are puffy.  · You have heart, liver, or kidney disease and get edema all of a sudden.  · You have a fever and your symptoms get worse all of a sudden.  This information is not intended to replace advice given to you by your health care provider. Make sure you discuss any questions you have with your health care provider.  Document Released: 06/05/2009 Document Revised: 05/25/2017 Document Reviewed: 10/10/2014  [employer] Patient Education © 2017 [employer]

## 2018-04-12 ENCOUNTER — HOSPITAL ENCOUNTER (OUTPATIENT)
Dept: LAB | Facility: MEDICAL CENTER | Age: 68
End: 2018-04-12
Attending: INTERNAL MEDICINE
Payer: MEDICARE

## 2018-04-12 LAB
ALBUMIN SERPL BCP-MCNC: 3.8 G/DL (ref 3.2–4.9)
ALBUMIN/GLOB SERPL: 0.9 G/DL
ALP SERPL-CCNC: 114 U/L (ref 30–99)
ALT SERPL-CCNC: 17 U/L (ref 2–50)
AMYLASE SERPL-CCNC: 109 U/L (ref 20–103)
ANION GAP SERPL CALC-SCNC: 8 MMOL/L (ref 0–11.9)
AST SERPL-CCNC: 20 U/L (ref 12–45)
BILIRUB SERPL-MCNC: 0.6 MG/DL (ref 0.1–1.5)
BUN SERPL-MCNC: 24 MG/DL (ref 8–22)
CALCIUM SERPL-MCNC: 9.4 MG/DL (ref 8.5–10.5)
CHLORIDE SERPL-SCNC: 111 MMOL/L (ref 96–112)
CO2 SERPL-SCNC: 25 MMOL/L (ref 20–33)
CREAT SERPL-MCNC: 1.38 MG/DL (ref 0.5–1.4)
GLOBULIN SER CALC-MCNC: 4.1 G/DL (ref 1.9–3.5)
GLUCOSE SERPL-MCNC: 110 MG/DL (ref 65–99)
LIPASE SERPL-CCNC: 45 U/L (ref 11–82)
POTASSIUM SERPL-SCNC: 4.8 MMOL/L (ref 3.6–5.5)
PROT SERPL-MCNC: 7.9 G/DL (ref 6–8.2)
SODIUM SERPL-SCNC: 144 MMOL/L (ref 135–145)

## 2018-04-12 PROCEDURE — 80053 COMPREHEN METABOLIC PANEL: CPT

## 2018-04-12 PROCEDURE — 82784 ASSAY IGA/IGD/IGG/IGM EACH: CPT

## 2018-04-12 PROCEDURE — 83690 ASSAY OF LIPASE: CPT

## 2018-04-12 PROCEDURE — 36415 COLL VENOUS BLD VENIPUNCTURE: CPT

## 2018-04-12 PROCEDURE — 82150 ASSAY OF AMYLASE: CPT

## 2018-04-12 NOTE — PROGRESS NOTES
Subjective:      Pt is a 68 y.o. female who presents with Leg Swelling (bilateral lower leg edema x 2 weeks .)            HPI  Pt notes new onset B/L foot swelling x 2 weeks and notes 1 week ago it was significant in her left foot but now her right foot is essentially back to normal size while there remains mild swelling and edema of left foot. Pt denies hx of CHF or venous insufficiency. Pt has not taken any Rx medications for this condition. Pt states the pain is a 4/10 with left foot tenderess, aching in nature and worse at night. Pt denies CP, SOB, NVD, paresthesias, headaches, dizziness, change in vision, hives, or other joint pain. The pt's medication list, problem list, and allergies have been evaluated and reviewed during today's visit.    PMH:  Past Medical History:   Diagnosis Date   • Diabetes    • Hyperlipidemia    • Hypertension        PSH:  Past Surgical History:   Procedure Laterality Date   • BREAST BIOPSY      benign left biopsy in 2005   • US-NEEDLE CORE BX-BREAST PANEL         Fam Hx:  the patient's family history is not pertinent to their current complaint      Soc HX:  Social History     Social History   • Marital status:      Spouse name: N/A   • Number of children: N/A   • Years of education: N/A     Occupational History   • Not on file.     Social History Main Topics   • Smoking status: Never Smoker   • Smokeless tobacco: Never Used   • Alcohol use No   • Drug use: No   • Sexual activity: Not on file     Other Topics Concern   • Not on file     Social History Narrative   • No narrative on file         Medications:    Current Outpatient Prescriptions:   •  vitamin D (CHOLECALCIFEROL) 1000 UNIT Tab, Take 1,000 Units by mouth every day., Disp: , Rfl:   •  Insulin Detemir (LEVEMIR FLEXPEN SC), Inject  as instructed., Disp: , Rfl:   •  ranitidine (ZANTAC) 150 MG Tab, Take 150 mg by mouth 2 times a day., Disp: , Rfl:   •  amlodipine (NORVASC) 10 MG Tab, Take 10 mg by mouth every day., Disp:  , Rfl:   •  pravastatin (PRAVACHOL) 10 MG Tab, Take 10 mg by mouth every evening., Disp: , Rfl:   •  levothyroxine (SYNTHROID) 125 MCG TABS, Take 125 mcg by mouth every day., Disp: , Rfl:   •  doxycycline (VIBRAMYCIN) 100 MG Cap, Take 1 Cap by mouth 2 times a day., Disp: 14 Cap, Rfl: 0  •  albuterol 108 (90 Base) MCG/ACT Aero Soln inhalation aerosol, Inhale 2 Puffs by mouth every four hours as needed for Shortness of Breath., Disp: 1 Inhaler, Rfl: 1  •  pancrelipase, Lip-Prot-Amyl, (PROTEAZE) 11737 UNITS Cap DR Particles, Take 1 Cap by mouth 3 times a day, with meals., Disp: 90 Cap, Rfl: 0  •  ondansetron (ZOFRAN ODT) 4 MG TABLET DISPERSIBLE, Take 1 Tab by mouth every 8 hours as needed., Disp: 12 Tab, Rfl: 0  •  oxycodone-acetaminophen (PERCOCET) 5-325 MG Tab, Take 1 Tab by mouth every 6 hours as needed (as needed for pain)., Disp: 15 Tab, Rfl: 0  •  LANCETS MICRO THIN 33G Misc, by Does not apply route., Disp: , Rfl:   •  sertraline (ZOLOFT) 50 MG TABS, Take 50 mg by mouth every day., Disp: , Rfl:   •  glyBURIDE (DIABETA) 5 MG TABS, Take 5 mg by mouth every morning with breakfast., Disp: , Rfl:   •  metformin (GLUCOPHAGE) 500 MG TABS, Take 500 mg by mouth. 3 tabs in the am 2 tabs in the evening, Disp: , Rfl:   •  hydrocodone-acetaminophen (VICODIN) 5-500 MG TABS, Take 1-2 Tabs by mouth every four hours as needed for 20 doses., Disp: 20 Each, Rfl: 0      Allergies:  Patient has no known allergies.    ROS  Constitutional: Negative for fever, chills and malaise/fatigue.   HENT: Negative for congestion and sore throat.    Eyes: Negative for blurred vision, double vision and photophobia.   Respiratory: Negative for cough and shortness of breath.    Cardiovascular: Negative for chest pain and palpitations.   Gastrointestinal: Negative for heartburn, nausea, vomiting, abdominal pain, diarrhea and constipation.   Genitourinary: Negative for dysuria and flank pain.   Musculoskeletal: POS for left>right foot swelling and  "edema  Skin: Negative for itching and rash.   Neurological: Negative for dizziness, tingling and headaches.   Endo/Heme/Allergies: Does not bruise/bleed easily.   Psychiatric/Behavioral: Negative for depression. The patient is not nervous/anxious.           Objective:     /76   Pulse 84   Temp 36.6 °C (97.8 °F)   Ht 1.575 m (5' 2\")   Wt 65.8 kg (145 lb)   SpO2 97%   BMI 26.52 kg/m²      Physical Exam   Musculoskeletal:        Right foot: There is swelling. There is normal range of motion, no tenderness, no bony tenderness, normal capillary refill, no crepitus, no deformity and no laceration.        Left foot: There is swelling. There is normal range of motion, no tenderness, no bony tenderness, normal capillary refill, no crepitus, no deformity and no laceration.        Feet:    NEG Danielle's B/L      Constitutional: PT is oriented to person, place, and time. PT appears well-developed and well-nourished. No distress.   HENT:   Head: Normocephalic and atraumatic.   Mouth/Throat: Oropharynx is clear and moist. No oropharyngeal exudate.   Eyes: Conjunctivae normal and EOM are normal. Pupils are equal, round, and reactive to light.   Neck: Normal range of motion. Neck supple. No thyromegaly present.   Cardiovascular: Normal rate, regular rhythm, normal heart sounds and intact distal pulses.  Exam reveals no gallop and no friction rub.    No murmur heard.  Pulmonary/Chest: Effort normal and breath sounds normal. No respiratory distress. PT has no wheezes. PT has no rales. Pt exhibits no tenderness.   Abdominal: Soft. Bowel sounds are normal. PT exhibits no distension and no mass. There is no tenderness. There is no rebound and no guarding.   Neurological: PT is alert and oriented to person, place, and time. PT has normal reflexes. No cranial nerve deficit.   Skin: Skin is warm and dry. No rash noted. PT is not diaphoretic. No erythema.       Psychiatric: PT has a normal mood and affect. PT behavior is normal. " Judgment and thought content normal.          Assessment/Plan:     1. Pedal edema-left>right    - REFERRAL TO CARDIOLOGY    STRICT ER precautions discussed  Pt with significant swelling in left>right foot last week and no mostly resolved for mild swelling to left foot today  Concern for specialist evaluation of venous insufficiency v cardiac origin of edema  Pt does not wish to go to ER today  Encouraged low sodium diet and increased fluids with exercise  Rest, fluids encouraged.  OTC decongestant for congestion/cough  Note given for work.  AVS with medical info given.  Pt was in full understanding and agreement with the plan.  Follow-up as needed if symptoms worsen or fail to improve.

## 2018-04-13 LAB — IGG SERPL-MCNC: 1650 MG/DL (ref 768–1632)

## 2018-04-20 ENCOUNTER — OFFICE VISIT (OUTPATIENT)
Dept: INTERNAL MEDICINE | Facility: MEDICAL CENTER | Age: 68
End: 2018-04-20
Payer: MEDICARE

## 2018-04-20 VITALS
SYSTOLIC BLOOD PRESSURE: 140 MMHG | DIASTOLIC BLOOD PRESSURE: 72 MMHG | HEART RATE: 80 BPM | OXYGEN SATURATION: 98 % | BODY MASS INDEX: 26.43 KG/M2 | TEMPERATURE: 98.8 F | WEIGHT: 143.6 LBS | HEIGHT: 62 IN

## 2018-04-20 DIAGNOSIS — E11.22 TYPE 2 DIABETES MELLITUS WITH STAGE 3 CHRONIC KIDNEY DISEASE, WITH LONG-TERM CURRENT USE OF INSULIN (HCC): ICD-10-CM

## 2018-04-20 DIAGNOSIS — I10 ESSENTIAL HYPERTENSION: ICD-10-CM

## 2018-04-20 DIAGNOSIS — Z79.4 TYPE 2 DIABETES MELLITUS WITH STAGE 3 CHRONIC KIDNEY DISEASE, WITH LONG-TERM CURRENT USE OF INSULIN (HCC): ICD-10-CM

## 2018-04-20 DIAGNOSIS — M10.9 GOUT OF FOOT, UNSPECIFIED CAUSE, UNSPECIFIED CHRONICITY, UNSPECIFIED LATERALITY: ICD-10-CM

## 2018-04-20 DIAGNOSIS — S05.8X2S: ICD-10-CM

## 2018-04-20 DIAGNOSIS — N18.30 CKD (CHRONIC KIDNEY DISEASE), STAGE III (HCC): ICD-10-CM

## 2018-04-20 DIAGNOSIS — E03.9 HYPOTHYROIDISM, UNSPECIFIED TYPE: ICD-10-CM

## 2018-04-20 DIAGNOSIS — N18.30 TYPE 2 DIABETES MELLITUS WITH STAGE 3 CHRONIC KIDNEY DISEASE, WITH LONG-TERM CURRENT USE OF INSULIN (HCC): ICD-10-CM

## 2018-04-20 PROBLEM — S05.8X2A: Status: ACTIVE | Noted: 2018-04-20

## 2018-04-20 LAB
HBA1C MFR BLD: 5.6 % (ref ?–5.8)
INT CON NEG: NORMAL
INT CON POS: NORMAL

## 2018-04-20 PROCEDURE — 99204 OFFICE O/P NEW MOD 45 MIN: CPT | Mod: GC | Performed by: INTERNAL MEDICINE

## 2018-04-20 PROCEDURE — 83036 HEMOGLOBIN GLYCOSYLATED A1C: CPT | Mod: GC | Performed by: INTERNAL MEDICINE

## 2018-04-20 ASSESSMENT — PAIN SCALES - GENERAL: PAINLEVEL: NO PAIN

## 2018-04-20 NOTE — LETTER
[facility]  José Miguel Arboleda M.D.  [address]  [place] [address]  Fax: [phone]   Authorization for Release/Disclosure of   Protected Health Information   Name: SANDRA CARPENTER : 1950 SSN: [id number]   Address: [address] [place] [address] [address]  [place] [address] Phone:    [phone] (home)    I authorize the entity listed below to release/disclose the PHI below to:   [facility]/José Miguel Arboleda M.D. and Katharine Carbajal M.D.   Provider or Entity Name:  Dr. Ba  [facility]   Address   City, [facility], [facility]   Phone:      Fax:     Reason for request: continuity of care   Information to be released:    [  ] LAST COLONOSCOPY,  including any PATH REPORT and follow-up  [  ] LAST FIT/COLOGUARD RESULT [  ] LAST DEXA  [  ] LAST MAMMOGRAM  [  ] LAST PAP  [  ] LAST LABS [  ] RETINA EXAM REPORT  [  ] IMMUNIZATION RECORDS  [  ] Release all info      [  ] Check here and initial the line next to each item to release ALL health information INCLUDING  _____ Care and treatment for drug and / or alcohol abuse  _____ HIV testing, infection status, or AIDS  _____ Genetic Testing    DATES OF SERVICE OR TIME PERIOD TO BE DISCLOSED: _____________  I understand and acknowledge that:  * This Authorization may be revoked at any time by you in writing, except if your health information has already been used or disclosed.  * Your health information that will be used or disclosed as a result of you signing this authorization could be re-disclosed by the recipient. If this occurs, your re-disclosed health information may no longer be protected by State or Federal laws.  * You may refuse to sign this Authorization. Your refusal will not affect your ability to obtain treatment.  * This Authorization becomes effective upon signing and will  on (date) __________.      If no date is indicated, this Authorization will  one (1) year from the signature date.    Name: Sandra Francois  Sukhwinder    Signature:   Date:     4/20/2018       PLEASE FAX REQUESTED RECORDS BACK TO: [phone]

## 2018-04-20 NOTE — LETTER
[facility]  José Miguel Arboleda M.D.  [address]  [place] [address]  Fax: [phone]   Authorization for Release/Disclosure of   Protected Health Information   Name: SANDRA MOLINAO : 1950 SSN: [id number]   Address: [address] [place] [address] [address]  [place] [address] Phone:    [phone] (home)    I authorize the entity listed below to release/disclose the PHI below to:   [facility]/José Miguel Arboleda M.D. and Katharine Carbajal M.D.   Provider or Entity Name:  Dr. Mathews   Address   City, [facility], [facility]   Phone:      Fax:     Reason for request: continuity of care   Information to be released:    [  ] LAST COLONOSCOPY,  including any PATH REPORT and follow-up  [  ] LAST FIT/COLOGUARD RESULT [  ] LAST DEXA  [  ] LAST MAMMOGRAM  [  ] LAST PAP  [  ] LAST LABS [  ] RETINA EXAM REPORT  [  ] IMMUNIZATION RECORDS  [  ] Release all info      [  ] Check here and initial the line next to each item to release ALL health information INCLUDING  _____ Care and treatment for drug and / or alcohol abuse  _____ HIV testing, infection status, or AIDS  _____ Genetic Testing    DATES OF SERVICE OR TIME PERIOD TO BE DISCLOSED: _____________  I understand and acknowledge that:  * This Authorization may be revoked at any time by you in writing, except if your health information has already been used or disclosed.  * Your health information that will be used or disclosed as a result of you signing this authorization could be re-disclosed by the recipient. If this occurs, your re-disclosed health information may no longer be protected by State or Federal laws.  * You may refuse to sign this Authorization. Your refusal will not affect your ability to obtain treatment.  * This Authorization becomes effective upon signing and will  on (date) __________.      If no date is indicated, this Authorization will  one (1) year from the signature date.    Name: Sandra Francois Sukhwinder    Signature:   Date:          4/20/2018       PLEASE FAX REQUESTED RECORDS BACK TO: [phone]

## 2018-04-20 NOTE — LETTER
[facility]  José Miguel Arboleda M.D.  [address]  [place] [address]  Fax: [phone]   Authorization for Release/Disclosure of   Protected Health Information   Name: SANDRA MOLINAO : 1950 SSN: [id number]   Address: [facility] [address]  [place] [address] Phone:    [phone] (home)    I authorize the entity listed below to release/disclose the PHI below to:   [employer]/José Miguel Arboleda M.D. and Katharine Carbajal M.D.   Provider or Entity Name:  [facility]   Address   City, [facility], [facility]   Phone:      Fax:     Reason for request: continuity of care   Information to be released:    [  ] LAST COLONOSCOPY,  including any PATH REPORT and follow-up  [  ] LAST FIT/COLOGUARD RESULT [  ] LAST DEXA  [  ] LAST MAMMOGRAM  [  ] LAST PAP  [  ] LAST LABS [  ] RETINA EXAM REPORT  [  ] IMMUNIZATION RECORDS  [  ] Release all info      [  ] Check here and initial the line next to each item to release ALL health information INCLUDING  _____ Care and treatment for drug and / or alcohol abuse  _____ HIV testing, infection status, or AIDS  _____ Genetic Testing    DATES OF SERVICE OR TIME PERIOD TO BE DISCLOSED: _____________  I understand and acknowledge that:  * This Authorization may be revoked at any time by you in writing, except if your health information has already been used or disclosed.  * Your health information that will be used or disclosed as a result of you signing this authorization could be re-disclosed by the recipient. If this occurs, your re-disclosed health information may no longer be protected by State or Federal laws.  * You may refuse to sign this Authorization. Your refusal will not affect your ability to obtain treatment.  * This Authorization becomes effective upon signing and will  on (date) __________.      If no date is indicated, this Authorization will  one (1) year from the signature date.    Name: Sandra Francois Sukhwinder    Signature:   Date:     4/20/2018       PLEASE FAX REQUESTED RECORDS BACK TO: [phone]

## 2018-04-20 NOTE — LETTER
[facility]  José Miguel Arboleda M.D.  [address]  [place] [address]  Fax: [phone]   Authorization for Release/Disclosure of   Protected Health Information   Name: SANDRA MOLINAO : 1950 SSN: [id number]   Address: [address] [place] [address] [address]  [place] [address] Phone:    [phone] (home)    I authorize the entity listed below to release/disclose the PHI below to:   [facility]/José Miguel Arboleda M.D. and Katharine Carbajal M.D.   Provider or Entity Name:  Dr. Askew   Address   City, [facility], [facility]   Phone:      Fax:     Reason for request: continuity of care   Information to be released:    [  ] LAST COLONOSCOPY,  including any PATH REPORT and follow-up  [  ] LAST FIT/COLOGUARD RESULT [  ] LAST DEXA  [  ] LAST MAMMOGRAM  [  ] LAST PAP  [  ] LAST LABS [  ] RETINA EXAM REPORT  [  ] IMMUNIZATION RECORDS  [  ] Release all info      [  ] Check here and initial the line next to each item to release ALL health information INCLUDING  _____ Care and treatment for drug and / or alcohol abuse  _____ HIV testing, infection status, or AIDS  _____ Genetic Testing    DATES OF SERVICE OR TIME PERIOD TO BE DISCLOSED: _____________  I understand and acknowledge that:  * This Authorization may be revoked at any time by you in writing, except if your health information has already been used or disclosed.  * Your health information that will be used or disclosed as a result of you signing this authorization could be re-disclosed by the recipient. If this occurs, your re-disclosed health information may no longer be protected by State or Federal laws.  * You may refuse to sign this Authorization. Your refusal will not affect your ability to obtain treatment.  * This Authorization becomes effective upon signing and will  on (date) __________.      If no date is indicated, this Authorization will  one (1) year from the signature date.    Name: Sandra Francois Sukhwinder    Signature:   Date:          4/20/2018       PLEASE FAX REQUESTED RECORDS BACK TO: [phone]

## 2018-04-21 NOTE — PROGRESS NOTES
New Patient to Establish    Reason to establish: PCP switch    CC: DM. HTN. Gout.     HPI: Patient is a 68 y.o female with HTN, hypothyroidism, CKD, gout who is here to establish care and follow up on her multiple chronic conditions. Patient reports that her doctor left the practice last year and she has not been able to get in to see another physician. Patient king snot speak English, so a  service was used to obtain history. Patient is not a good historian.    DM  Patient diagnosed around 10 years ago and was initially treated with oral medications. Then, 2 years ago, she was started on insulin because her diabetes was poorly controlled. Currently she in on Levemir 30 units daily. A1C in the office is 5.6. Patient is asymptomatic and denies any vision changes, polyuria, polydipsia, numbness or tingling in her extremities. She has no wounds or ulcers. Her eye exam done this year was normal. Of note, the patient does have CKD and she follows with nephrology.    Hypothyroidism  Patient has been on synthroid for many year and reports that when she was diagnosed, her presenting complaint was fatigue and weight gain. Currently asymptomatic and reports compliance with her medications.    Gout  Patient occasionally gets flares of gout that affect her big toe. Prior to her doctor leaving, she took medication for gout, but doesn't recall the name. After her doctor retired, she has been treating her gout with green juices and supportive care. Currently she is not in a flare.    Other  Of note, patient was evaluated in the past for a bleeding disorder. Patient stated that she has never had any bleeding issues, but her sister who accompanied her to the visit noted that she's been worked up for this issue.    Patient Active Problem List    Diagnosis Date Noted   • Type 2 diabetes mellitus with stage 3 chronic kidney disease, with long-term current use of insulin (CMS-[facility]) 04/20/2018   • Gout of foot 04/20/2018   •  "Essential hypertension 04/20/2018   • Hypothyroidism 04/20/2018       Past Medical History:   Diagnosis Date   • Bleeding    • Diabetes    • Hyperlipidemia    • Hypertension        Current Outpatient Prescriptions   Medication Sig Dispense Refill   • vitamin D (CHOLECALCIFEROL) 1000 UNIT Tab Take 1,000 Units by mouth every day.     • Insulin Detemir (LEVEMIR FLEXPEN SC) Inject  as instructed.     • ranitidine (ZANTAC) 150 MG Tab Take 150 mg by mouth 2 times a day.     • amlodipine (NORVASC) 10 MG Tab Take 10 mg by mouth every day.     • pravastatin (PRAVACHOL) 10 MG Tab Take 10 mg by mouth every evening.     • LANCETS MICRO THIN 33G Misc by Does not apply route.     • levothyroxine (SYNTHROID) 125 MCG TABS Take 125 mcg by mouth every day.       No current facility-administered medications for this visit.        Allergies as of 04/20/2018   • (No Known Allergies)       Social History     Social History   • Marital status:      Spouse name: N/A   • Number of children: N/A   • Years of education: N/A     Occupational History   • Not on file.     Social History Main Topics   • Smoking status: Never Smoker   • Smokeless tobacco: Never Used   • Alcohol use No   • Drug use: No   • Sexual activity: Not on file     Other Topics Concern   • Not on file     Social History Narrative   • No narrative on file       Family History   Problem Relation Age of Onset   • Diabetes Mother    • Diabetes Maternal Grandmother    • Hypertension Sister    • Hypertension Brother        Past Surgical History:   Procedure Laterality Date   • BREAST BIOPSY      benign left biopsy in 2005   • CHOLECYSTECTOMY     • US-NEEDLE CORE BX-BREAST PANEL         ROS: As per HPI. Additional pertinent symptoms as noted below.    All others negative    /72   Pulse 80   Temp 37.1 °C (98.8 °F)   Ht 1.575 m (5' 2\")   Wt 65.1 kg (143 lb 9.6 oz)   SpO2 98%   Breastfeeding? No   BMI 26.26 kg/m²     Physical Exam  General:  Alert and oriented, No " "apparent distress.    Eyes: Pupils equal and reactive. No scleral icterus.    Throat: Clear no erythema or exudates noted.    Neck: Supple. No lymphadenopathy noted. Thyroid not enlarged.    Lungs: Clear to auscultation and percussion bilaterally.    Cardiovascular: Regular rate and rhythm. No murmurs, rubs or gallops.    Abdomen:  Benign. No rebound or guarding noted.    Extremities: No clubbing, cyanosis, edema.    Skin: Clear. No rash or suspicious skin lesions noted.    Other: Feet with normal skin. Joints normal.    Note: I have reviewed all pertinent labs and diagnostic tests associated with this visit with specific comments listed under the assessment and plan below    Assessment and Plan    1. Type 2 diabetes mellitus with stage 3 chronic kidney disease, with long-term current use of insulin (CMS-HCC)  Patient with mian standing DM, well controlled with Levemir. Currently asymtomatic. Eye exam this year without diabetic changes.  - Decrease dose of Levemir from 30 to 25 U nightly.  - Continue to maintain good diet. Patient counseled to start daily exercise.  - Will obtain records from PCP for further history of treatment.    2. Gout of foot, unspecified cause, unspecified chronicity, unspecified laterality  Currently asymptomatic. We will keep an eye on her symptoms for a flare up.    3. Essential hypertension  Well controlled on current regimen. Will reevaluate at next visit.    4. Hypothyroidism, unspecified type  Asymptomatic.  - Continue Synthroid at current dose.    5. CKD (chronic kidney disease), stage III  Labs show decreased GFR and elevated creatinine. Patient stated that she follows with nephrology and they have been saying that her kidneys are \"fine.\" Patient is also not on an ACE or ARB and odes not recall if she's ever been on these medications.  - will obtain records from nephrology    6. Other  I will obtain records from all of the specialists that the patient has seen to better understand " what the patient's course of illness has been to further optimize her treatment.      Followup: Return in about 3 months (around 7/20/2018).    Signed by: Katharine Carbajal M.D.

## 2018-04-25 ENCOUNTER — OFFICE VISIT (OUTPATIENT)
Dept: CARDIOLOGY | Facility: MEDICAL CENTER | Age: 68
End: 2018-04-25
Payer: MEDICARE

## 2018-04-25 VITALS
SYSTOLIC BLOOD PRESSURE: 134 MMHG | OXYGEN SATURATION: 97 % | DIASTOLIC BLOOD PRESSURE: 80 MMHG | BODY MASS INDEX: 26.13 KG/M2 | HEART RATE: 82 BPM | HEIGHT: 62 IN | WEIGHT: 142 LBS

## 2018-04-25 DIAGNOSIS — R94.31 NONSPECIFIC ABNORMAL ELECTROCARDIOGRAM (ECG) (EKG): ICD-10-CM

## 2018-04-25 DIAGNOSIS — R01.1 UNDIAGNOSED CARDIAC MURMURS: ICD-10-CM

## 2018-04-25 DIAGNOSIS — R07.89 OTHER CHEST PAIN: ICD-10-CM

## 2018-04-25 LAB — EKG IMPRESSION: NORMAL

## 2018-04-25 PROCEDURE — 99214 OFFICE O/P EST MOD 30 MIN: CPT | Performed by: INTERNAL MEDICINE

## 2018-04-25 PROCEDURE — 93000 ELECTROCARDIOGRAM COMPLETE: CPT | Performed by: INTERNAL MEDICINE

## 2018-04-25 ASSESSMENT — ENCOUNTER SYMPTOMS
PND: 0
COUGH: 0
CHILLS: 0
PALPITATIONS: 0
BLURRED VISION: 1
NAUSEA: 0
DEPRESSION: 0
ABDOMINAL PAIN: 1
EYE DISCHARGE: 0
DIZZINESS: 0
MYALGIAS: 0
FEVER: 0
HEADACHES: 0
BRUISES/BLEEDS EASILY: 0
HEARTBURN: 0
SHORTNESS OF BREATH: 0
NERVOUS/ANXIOUS: 0

## 2018-04-25 NOTE — PROGRESS NOTES
Chief Complaint   Patient presents with   • Edema       Subjective:   Sandra Pretty is a 68 y.o. female who presents today in consultation at the request of physician's assistant, Rasta.  She is here for intermittent pedal edema. About one year ago and on 3 other occasions she had spontaneous swelling of her lower extremities to midcalf bilaterally.  The last episode was in March of this year. The 1st was one year ago. Swelling does not reach the knees. It is unprovoked, as best as she can tell and is bilateral in appearance. She's never had venous disease of her legs.    She's had one episode of noncardiac chest pain for which I saw her February 2017. EKG was normal. No evaluation done. No heart murmur heard.  She has chronic pancreatitis and diabetes mellitus and takes insulin with an excellent glycohemoglobin below 7.  Nontobacco user.  No history of chest discomfort or palpitations or shortness of breath.  Treated hypertension and does take amlodipine 10 mg per day    Past Medical History:   Diagnosis Date   • Bleeding    • Diabetes    • Hyperlipidemia    • Hypertension      Past Surgical History:   Procedure Laterality Date   • BREAST BIOPSY      benign left biopsy in 2005   • CHOLECYSTECTOMY     • US-NEEDLE CORE BX-BREAST PANEL       Family History   Problem Relation Age of Onset   • Diabetes Mother    • Diabetes Maternal Grandmother    • Hypertension Sister    • Hypertension Brother      Social History     Social History   • Marital status:      Spouse name: N/A   • Number of children: N/A   • Years of education: N/A     Occupational History   • Not on file.     Social History Main Topics   • Smoking status: Never Smoker   • Smokeless tobacco: Never Used   • Alcohol use No   • Drug use: No   • Sexual activity: Not on file     Other Topics Concern   • Not on file     Social History Narrative   • No narrative on file     No Known Allergies  Outpatient Encounter Prescriptions as of 4/25/2018  "  Medication Sig Dispense Refill   • vitamin D (CHOLECALCIFEROL) 1000 UNIT Tab Take 1,000 Units by mouth every day.     • Insulin Detemir (LEVEMIR FLEXPEN SC) Inject  as instructed.     • ranitidine (ZANTAC) 150 MG Tab Take 150 mg by mouth 2 times a day.     • amlodipine (NORVASC) 10 MG Tab Take 10 mg by mouth every day.     • pravastatin (PRAVACHOL) 10 MG Tab Take 10 mg by mouth every evening.     • LANCETS MICRO THIN 33G Misc by Does not apply route.     • levothyroxine (SYNTHROID) 125 MCG TABS Take 112 mcg by mouth every day.       No facility-administered encounter medications on file as of 4/25/2018.      Review of Systems   Constitutional: Negative for chills, fever and malaise/fatigue.   Eyes: Positive for blurred vision. Negative for discharge.   Respiratory: Negative for cough and shortness of breath.    Cardiovascular: Positive for leg swelling. Negative for chest pain, palpitations and PND.   Gastrointestinal: Positive for abdominal pain. Negative for heartburn and nausea.   Genitourinary: Negative for dysuria and urgency.   Musculoskeletal: Negative for myalgias.   Skin: Negative for itching and rash.   Neurological: Negative for dizziness and headaches.   Endo/Heme/Allergies: Negative for environmental allergies. Does not bruise/bleed easily.   Psychiatric/Behavioral: Negative for depression. The patient is not nervous/anxious.         Objective:   /80   Pulse 82   Ht 1.575 m (5' 2\")   Wt 64.4 kg (142 lb)   SpO2 97%   BMI 25.97 kg/m²     Physical Exam   Constitutional: She is oriented to person, place, and time.   Neck: No JVD present.   Cardiovascular: Intact distal pulses.    Murmur heard.   Systolic murmur is present with a grade of 2/6   Left lower sternal border. Mid systolic   Pulmonary/Chest: Effort normal and breath sounds normal.   Abdominal: Soft. Bowel sounds are normal.   Musculoskeletal: She exhibits no edema.   Neurological: She is alert and oriented to person, place, and time. "   Skin: Skin is warm and dry.       Assessment:     1. Other chest pain  [facility] EKG (Clinic Performed)   2. Nonspecific abnormal electrocardiogram (ECG) (EKG)  [facility] EKG (Clinic Performed)   3. Undiagnosed cardiac murmurs         Medical Decision Making:  Today's Assessment / Status / Plan:   Intermittent edema is very likely due to amlodipine usage and brief episodes of excessive salt intake.  It is unlikely to be cardiac in origin.  However, auscultation of heart murmur is noted and it was not heard to 15 months ago, therefore obtain echocardiogram.  I did explain the concept of edema from amlodipine which is a very safe excellent antihypertensive. I will suggest continuing the drug at this time.  If echo is reassuring, no need for return for follow-up visit.  Thank you for this consultation

## 2018-04-25 NOTE — LETTER
[facility]   [address]   [address]  [place] [address]  Phone: [phone]  Fax: [phone]              Sandra Pretty  1950    Encounter Date: 4/25/2018    Will Guzman M.D.          PROGRESS NOTE:  Chief Complaint   Patient presents with   • Edema       Subjective:   Sandra Pretty is a 68 y.o. female who presents today in consultation at the request of physician's assistant, Rasta.  She is here for intermittent pedal edema. About one year ago and on 3 other occasions she had spontaneous swelling of her lower extremities to midcalf bilaterally.  The last episode was in March of this year. The 1st was one year ago. Swelling does not reach the knees. It is unprovoked, as best as she can tell and is bilateral in appearance. She's never had venous disease of her legs.    She's had one episode of noncardiac chest pain for which I saw her February 2017. EKG was normal. No evaluation done. No heart murmur heard.  She has chronic pancreatitis and diabetes mellitus and takes insulin with an excellent glycohemoglobin below 7.  Nontobacco user.  No history of chest discomfort or palpitations or shortness of breath.  Treated hypertension and does take amlodipine 10 mg per day    Past Medical History:   Diagnosis Date   • Bleeding    • Diabetes    • Hyperlipidemia    • Hypertension      Past Surgical History:   Procedure Laterality Date   • BREAST BIOPSY      benign left biopsy in 2005   • CHOLECYSTECTOMY     • US-NEEDLE CORE BX-BREAST PANEL       Family History   Problem Relation Age of Onset   • Diabetes Mother    • Diabetes Maternal Grandmother    • Hypertension Sister    • Hypertension Brother      Social History     Social History   • Marital status:      Spouse name: N/A   • Number of children: N/A   • Years of education: N/A     Occupational History   • Not on file.     Social History Main Topics   •  "Smoking status: Never Smoker   • Smokeless tobacco: Never Used   • Alcohol use No   • Drug use: No   • Sexual activity: Not on file     Other Topics Concern   • Not on file     Social History Narrative   • No narrative on file     No Known Allergies  Outpatient Encounter Prescriptions as of 4/25/2018   Medication Sig Dispense Refill   • vitamin D (CHOLECALCIFEROL) 1000 UNIT Tab Take 1,000 Units by mouth every day.     • Insulin Detemir (LEVEMIR FLEXPEN SC) Inject  as instructed.     • ranitidine (ZANTAC) 150 MG Tab Take 150 mg by mouth 2 times a day.     • amlodipine (NORVASC) 10 MG Tab Take 10 mg by mouth every day.     • pravastatin (PRAVACHOL) 10 MG Tab Take 10 mg by mouth every evening.     • LANCETS MICRO THIN 33G Misc by Does not apply route.     • levothyroxine (SYNTHROID) 125 MCG TABS Take 112 mcg by mouth every day.       No facility-administered encounter medications on file as of 4/25/2018.      Review of Systems   Constitutional: Negative for chills, fever and malaise/fatigue.   Eyes: Positive for blurred vision. Negative for discharge.   Respiratory: Negative for cough and shortness of breath.    Cardiovascular: Positive for leg swelling. Negative for chest pain, palpitations and PND.   Gastrointestinal: Positive for abdominal pain. Negative for heartburn and nausea.   Genitourinary: Negative for dysuria and urgency.   Musculoskeletal: Negative for myalgias.   Skin: Negative for itching and rash.   Neurological: Negative for dizziness and headaches.   Endo/Heme/Allergies: Negative for environmental allergies. Does not bruise/bleed easily.   Psychiatric/Behavioral: Negative for depression. The patient is not nervous/anxious.         Objective:   /80   Pulse 82   Ht 1.575 m (5' 2\")   Wt 64.4 kg (142 lb)   SpO2 97%   BMI 25.97 kg/m²      Physical Exam   Constitutional: She is oriented to person, place, and time.   Neck: No JVD present.   Cardiovascular: Intact distal pulses.    Murmur heard.   " Systolic murmur is present with a grade of 2/6   Left lower sternal border. Mid systolic   Pulmonary/Chest: Effort normal and breath sounds normal.   Abdominal: Soft. Bowel sounds are normal.   Musculoskeletal: She exhibits no edema.   Neurological: She is alert and oriented to person, place, and time.   Skin: Skin is warm and dry.       Assessment:     1. Other chest pain  [facility] EKG (Clinic Performed)   2. Nonspecific abnormal electrocardiogram (ECG) (EKG)  [facility] EKG (Clinic Performed)   3. Undiagnosed cardiac murmurs         Medical Decision Making:  Today's Assessment / Status / Plan:   Intermittent edema is very likely due to amlodipine usage and brief episodes of excessive salt intake.  It is unlikely to be cardiac in origin.  However, auscultation of heart murmur is noted and it was not heard to 15 months ago, therefore obtain echocardiogram.  I did explain the concept of edema from amlodipine which is a very safe excellent antihypertensive. I will suggest continuing the drug at this time.  If echo is reassuring, no need for return for follow-up visit.  Thank you for this consultation      José Miguel Arboleda M.D.  [address] [address]  VIA In Basket

## 2018-04-26 ENCOUNTER — HOSPITAL ENCOUNTER (OUTPATIENT)
Dept: CARDIOLOGY | Facility: MEDICAL CENTER | Age: 68
End: 2018-04-26
Attending: INTERNAL MEDICINE
Payer: MEDICARE

## 2018-04-26 DIAGNOSIS — R01.1 UNDIAGNOSED CARDIAC MURMURS: ICD-10-CM

## 2018-04-26 DIAGNOSIS — R94.31 NONSPECIFIC ABNORMAL ELECTROCARDIOGRAM (ECG) (EKG): ICD-10-CM

## 2018-04-26 LAB
LV EJECT FRACT MOD 2C 99903: 71.97
LV EJECT FRACT MOD 4C 99902: 47.63
LV EJECT FRACT MOD BP 99901: 56.06

## 2018-04-26 PROCEDURE — 93306 TTE W/DOPPLER COMPLETE: CPT | Mod: 26 | Performed by: INTERNAL MEDICINE

## 2018-04-26 PROCEDURE — 93306 TTE W/DOPPLER COMPLETE: CPT

## 2018-06-09 ENCOUNTER — HOSPITAL ENCOUNTER (OUTPATIENT)
Dept: LAB | Facility: MEDICAL CENTER | Age: 68
End: 2018-06-09
Attending: INTERNAL MEDICINE
Payer: MEDICARE

## 2018-06-09 LAB
ALBUMIN SERPL BCP-MCNC: 3.8 G/DL (ref 3.2–4.9)
BUN SERPL-MCNC: 25 MG/DL (ref 8–22)
C3 SERPL-MCNC: 121 MG/DL (ref 87–200)
C4 SERPL-MCNC: 17 MG/DL (ref 19–52)
CALCIUM SERPL-MCNC: 9.7 MG/DL (ref 8.5–10.5)
CHLORIDE SERPL-SCNC: 110 MMOL/L (ref 96–112)
CO2 SERPL-SCNC: 21 MMOL/L (ref 20–33)
CREAT SERPL-MCNC: 1.42 MG/DL (ref 0.5–1.4)
CREAT UR-MCNC: 91.5 MG/DL
GLUCOSE SERPL-MCNC: 202 MG/DL (ref 65–99)
PHOSPHATE SERPL-MCNC: 3.3 MG/DL (ref 2.5–4.5)
POTASSIUM SERPL-SCNC: 4.7 MMOL/L (ref 3.6–5.5)
PROT UR-MCNC: 427.4 MG/DL (ref 0–15)
PROT/CREAT UR: 4671 MG/G (ref 10–107)
SODIUM SERPL-SCNC: 139 MMOL/L (ref 135–145)

## 2018-06-09 PROCEDURE — 80069 RENAL FUNCTION PANEL: CPT

## 2018-06-09 PROCEDURE — 86160 COMPLEMENT ANTIGEN: CPT | Mod: 91

## 2018-06-09 PROCEDURE — 82570 ASSAY OF URINE CREATININE: CPT

## 2018-06-09 PROCEDURE — 36415 COLL VENOUS BLD VENIPUNCTURE: CPT

## 2018-06-09 PROCEDURE — 86162 COMPLEMENT TOTAL (CH50): CPT

## 2018-06-09 PROCEDURE — 84156 ASSAY OF PROTEIN URINE: CPT

## 2018-06-13 LAB — CH50 SERPL-ACNC: 103 CAE UNITS (ref 60–144)

## 2018-07-09 ENCOUNTER — HOSPITAL ENCOUNTER (OUTPATIENT)
Dept: LAB | Facility: MEDICAL CENTER | Age: 68
End: 2018-07-09
Attending: NURSE PRACTITIONER
Payer: MEDICARE

## 2018-07-09 LAB
25(OH)D3 SERPL-MCNC: 22 NG/ML (ref 30–100)
ALBUMIN SERPL BCP-MCNC: 3.9 G/DL (ref 3.2–4.9)
ALBUMIN/GLOB SERPL: 1 G/DL
ALP SERPL-CCNC: 127 U/L (ref 30–99)
ALT SERPL-CCNC: 12 U/L (ref 2–50)
ANION GAP SERPL CALC-SCNC: 10 MMOL/L (ref 0–11.9)
APPEARANCE UR: CLEAR
AST SERPL-CCNC: 15 U/L (ref 12–45)
BACTERIA #/AREA URNS HPF: NEGATIVE /HPF
BASOPHILS # BLD AUTO: 0.4 % (ref 0–1.8)
BASOPHILS # BLD: 0.02 K/UL (ref 0–0.12)
BILIRUB SERPL-MCNC: 0.5 MG/DL (ref 0.1–1.5)
BILIRUB UR QL STRIP.AUTO: NEGATIVE
BUN SERPL-MCNC: 23 MG/DL (ref 8–22)
CALCIUM SERPL-MCNC: 9.7 MG/DL (ref 8.5–10.5)
CHLORIDE SERPL-SCNC: 111 MMOL/L (ref 96–112)
CO2 SERPL-SCNC: 20 MMOL/L (ref 20–33)
COLOR UR: YELLOW
CREAT SERPL-MCNC: 1.35 MG/DL (ref 0.5–1.4)
EOSINOPHIL # BLD AUTO: 0.06 K/UL (ref 0–0.51)
EOSINOPHIL NFR BLD: 1.2 % (ref 0–6.9)
EPI CELLS #/AREA URNS HPF: ABNORMAL /HPF
ERYTHROCYTE [DISTWIDTH] IN BLOOD BY AUTOMATED COUNT: 43.4 FL (ref 35.9–50)
EST. AVERAGE GLUCOSE BLD GHB EST-MCNC: 131 MG/DL
GLOBULIN SER CALC-MCNC: 4.1 G/DL (ref 1.9–3.5)
GLUCOSE SERPL-MCNC: 124 MG/DL (ref 65–99)
GLUCOSE UR STRIP.AUTO-MCNC: NEGATIVE MG/DL
HBA1C MFR BLD: 6.2 % (ref 0–5.6)
HCT VFR BLD AUTO: 30.9 % (ref 37–47)
HGB BLD-MCNC: 10.2 G/DL (ref 12–16)
HYALINE CASTS #/AREA URNS LPF: ABNORMAL /LPF
IMM GRANULOCYTES # BLD AUTO: 0.01 K/UL (ref 0–0.11)
IMM GRANULOCYTES NFR BLD AUTO: 0.2 % (ref 0–0.9)
KETONES UR STRIP.AUTO-MCNC: NEGATIVE MG/DL
LEUKOCYTE ESTERASE UR QL STRIP.AUTO: NEGATIVE
LYMPHOCYTES # BLD AUTO: 1.09 K/UL (ref 1–4.8)
LYMPHOCYTES NFR BLD: 21.2 % (ref 22–41)
MAGNESIUM SERPL-MCNC: 1.9 MG/DL (ref 1.5–2.5)
MCH RBC QN AUTO: 31.4 PG (ref 27–33)
MCHC RBC AUTO-ENTMCNC: 33 G/DL (ref 33.6–35)
MCV RBC AUTO: 95.1 FL (ref 81.4–97.8)
MICRO URNS: ABNORMAL
MONOCYTES # BLD AUTO: 0.38 K/UL (ref 0–0.85)
MONOCYTES NFR BLD AUTO: 7.4 % (ref 0–13.4)
NEUTROPHILS # BLD AUTO: 3.57 K/UL (ref 2–7.15)
NEUTROPHILS NFR BLD: 69.6 % (ref 44–72)
NITRITE UR QL STRIP.AUTO: NEGATIVE
NRBC # BLD AUTO: 0 K/UL
NRBC BLD-RTO: 0 /100 WBC
PH UR STRIP.AUTO: 6 [PH]
PHOSPHATE SERPL-MCNC: 3.8 MG/DL (ref 2.5–4.5)
PLATELET # BLD AUTO: 187 K/UL (ref 164–446)
PMV BLD AUTO: 10.4 FL (ref 9–12.9)
POTASSIUM SERPL-SCNC: 4.7 MMOL/L (ref 3.6–5.5)
PROT SERPL-MCNC: 8 G/DL (ref 6–8.2)
PROT UR QL STRIP: 300 MG/DL
PTH-INTACT SERPL-MCNC: 83.6 PG/ML (ref 14–72)
RBC # BLD AUTO: 3.25 M/UL (ref 4.2–5.4)
RBC # URNS HPF: ABNORMAL /HPF
RBC UR QL AUTO: ABNORMAL
SODIUM SERPL-SCNC: 141 MMOL/L (ref 135–145)
SP GR UR STRIP.AUTO: 1.02
URATE SERPL-MCNC: 7.6 MG/DL (ref 1.9–8.2)
UROBILINOGEN UR STRIP.AUTO-MCNC: 0.2 MG/DL
WBC # BLD AUTO: 5.1 K/UL (ref 4.8–10.8)
WBC #/AREA URNS HPF: ABNORMAL /HPF

## 2018-07-09 PROCEDURE — 83735 ASSAY OF MAGNESIUM: CPT

## 2018-07-09 PROCEDURE — 85025 COMPLETE CBC W/AUTO DIFF WBC: CPT

## 2018-07-09 PROCEDURE — 84156 ASSAY OF PROTEIN URINE: CPT

## 2018-07-09 PROCEDURE — 84100 ASSAY OF PHOSPHORUS: CPT

## 2018-07-09 PROCEDURE — 83970 ASSAY OF PARATHORMONE: CPT

## 2018-07-09 PROCEDURE — 84550 ASSAY OF BLOOD/URIC ACID: CPT

## 2018-07-09 PROCEDURE — 80053 COMPREHEN METABOLIC PANEL: CPT

## 2018-07-09 PROCEDURE — 83036 HEMOGLOBIN GLYCOSYLATED A1C: CPT | Mod: GA

## 2018-07-09 PROCEDURE — 82570 ASSAY OF URINE CREATININE: CPT

## 2018-07-09 PROCEDURE — 81001 URINALYSIS AUTO W/SCOPE: CPT

## 2018-07-09 PROCEDURE — 82306 VITAMIN D 25 HYDROXY: CPT

## 2018-07-09 PROCEDURE — 36415 COLL VENOUS BLD VENIPUNCTURE: CPT

## 2018-07-10 LAB
CREAT UR-MCNC: 99.3 MG/DL
PROT UR-MCNC: 541.8 MG/DL (ref 0–15)
PROT/CREAT UR: 5456 MG/G (ref 10–107)

## 2018-08-17 ENCOUNTER — HOSPITAL ENCOUNTER (OUTPATIENT)
Dept: LAB | Facility: MEDICAL CENTER | Age: 68
End: 2018-08-17
Attending: NURSE PRACTITIONER
Payer: MEDICARE

## 2018-08-23 ENCOUNTER — HOSPITAL ENCOUNTER (OUTPATIENT)
Dept: LAB | Facility: MEDICAL CENTER | Age: 68
End: 2018-08-23
Attending: NURSE PRACTITIONER
Payer: MEDICARE

## 2018-08-23 LAB
25(OH)D3 SERPL-MCNC: 26 NG/ML (ref 30–100)
ALBUMIN SERPL BCP-MCNC: 3.6 G/DL (ref 3.2–4.9)
ALBUMIN/GLOB SERPL: 0.9 G/DL
ALP SERPL-CCNC: 134 U/L (ref 30–99)
ALT SERPL-CCNC: 10 U/L (ref 2–50)
ANION GAP SERPL CALC-SCNC: 9 MMOL/L (ref 0–11.9)
APPEARANCE UR: CLEAR
AST SERPL-CCNC: 15 U/L (ref 12–45)
BACTERIA #/AREA URNS HPF: NEGATIVE /HPF
BASOPHILS # BLD AUTO: 0.6 % (ref 0–1.8)
BASOPHILS # BLD: 0.03 K/UL (ref 0–0.12)
BILIRUB SERPL-MCNC: 0.5 MG/DL (ref 0.1–1.5)
BILIRUB UR QL STRIP.AUTO: NEGATIVE
BUN SERPL-MCNC: 21 MG/DL (ref 8–22)
CALCIUM SERPL-MCNC: 9.4 MG/DL (ref 8.5–10.5)
CHLORIDE SERPL-SCNC: 113 MMOL/L (ref 96–112)
CO2 SERPL-SCNC: 19 MMOL/L (ref 20–33)
COLOR UR: YELLOW
CREAT SERPL-MCNC: 1.37 MG/DL (ref 0.5–1.4)
CREAT UR-MCNC: 42.4 MG/DL
EOSINOPHIL # BLD AUTO: 0.07 K/UL (ref 0–0.51)
EOSINOPHIL NFR BLD: 1.5 % (ref 0–6.9)
EPI CELLS #/AREA URNS HPF: NEGATIVE /HPF
ERYTHROCYTE [DISTWIDTH] IN BLOOD BY AUTOMATED COUNT: 44 FL (ref 35.9–50)
EST. AVERAGE GLUCOSE BLD GHB EST-MCNC: 128 MG/DL
GLOBULIN SER CALC-MCNC: 3.9 G/DL (ref 1.9–3.5)
GLUCOSE SERPL-MCNC: 109 MG/DL (ref 65–99)
GLUCOSE UR STRIP.AUTO-MCNC: NEGATIVE MG/DL
HBA1C MFR BLD: 6.1 % (ref 0–5.6)
HCT VFR BLD AUTO: 30.9 % (ref 37–47)
HGB BLD-MCNC: 10.4 G/DL (ref 12–16)
HYALINE CASTS #/AREA URNS LPF: ABNORMAL /LPF
IMM GRANULOCYTES # BLD AUTO: 0.01 K/UL (ref 0–0.11)
IMM GRANULOCYTES NFR BLD AUTO: 0.2 % (ref 0–0.9)
KETONES UR STRIP.AUTO-MCNC: NEGATIVE MG/DL
LEUKOCYTE ESTERASE UR QL STRIP.AUTO: NEGATIVE
LYMPHOCYTES # BLD AUTO: 1.69 K/UL (ref 1–4.8)
LYMPHOCYTES NFR BLD: 36.4 % (ref 22–41)
MAGNESIUM SERPL-MCNC: 2 MG/DL (ref 1.5–2.5)
MCH RBC QN AUTO: 31.9 PG (ref 27–33)
MCHC RBC AUTO-ENTMCNC: 33.7 G/DL (ref 33.6–35)
MCV RBC AUTO: 94.8 FL (ref 81.4–97.8)
MICRO URNS: ABNORMAL
MONOCYTES # BLD AUTO: 0.3 K/UL (ref 0–0.85)
MONOCYTES NFR BLD AUTO: 6.5 % (ref 0–13.4)
NEUTROPHILS # BLD AUTO: 2.54 K/UL (ref 2–7.15)
NEUTROPHILS NFR BLD: 54.8 % (ref 44–72)
NITRITE UR QL STRIP.AUTO: NEGATIVE
NRBC # BLD AUTO: 0 K/UL
NRBC BLD-RTO: 0 /100 WBC
PH UR STRIP.AUTO: 6 [PH]
PHOSPHATE SERPL-MCNC: 3.4 MG/DL (ref 2.5–4.5)
PLATELET # BLD AUTO: 171 K/UL (ref 164–446)
PMV BLD AUTO: 10.7 FL (ref 9–12.9)
POTASSIUM SERPL-SCNC: 4.3 MMOL/L (ref 3.6–5.5)
PROT SERPL-MCNC: 7.5 G/DL (ref 6–8.2)
PROT UR QL STRIP: 300 MG/DL
PROT UR-MCNC: 208.2 MG/DL (ref 0–15)
PROT UR-MCNC: 209.7 MG/DL (ref 0–15)
PROT/CREAT UR: 4946 MG/G (ref 10–107)
RBC # BLD AUTO: 3.26 M/UL (ref 4.2–5.4)
RBC # URNS HPF: ABNORMAL /HPF
RBC UR QL AUTO: ABNORMAL
SODIUM SERPL-SCNC: 141 MMOL/L (ref 135–145)
SP GR UR STRIP.AUTO: 1.01
URATE SERPL-MCNC: 8.8 MG/DL (ref 1.9–8.2)
UROBILINOGEN UR STRIP.AUTO-MCNC: 0.2 MG/DL
WBC # BLD AUTO: 4.6 K/UL (ref 4.8–10.8)
WBC #/AREA URNS HPF: ABNORMAL /HPF

## 2018-08-23 PROCEDURE — 83036 HEMOGLOBIN GLYCOSYLATED A1C: CPT | Mod: GA

## 2018-08-23 PROCEDURE — 85025 COMPLETE CBC W/AUTO DIFF WBC: CPT

## 2018-08-23 PROCEDURE — 82570 ASSAY OF URINE CREATININE: CPT

## 2018-08-23 PROCEDURE — 84550 ASSAY OF BLOOD/URIC ACID: CPT

## 2018-08-23 PROCEDURE — 81001 URINALYSIS AUTO W/SCOPE: CPT

## 2018-08-23 PROCEDURE — 82306 VITAMIN D 25 HYDROXY: CPT | Mod: GA

## 2018-08-23 PROCEDURE — 83735 ASSAY OF MAGNESIUM: CPT | Mod: GA

## 2018-08-23 PROCEDURE — 80053 COMPREHEN METABOLIC PANEL: CPT

## 2018-08-23 PROCEDURE — 36415 COLL VENOUS BLD VENIPUNCTURE: CPT

## 2018-08-23 PROCEDURE — 84156 ASSAY OF PROTEIN URINE: CPT

## 2018-08-23 PROCEDURE — 84100 ASSAY OF PHOSPHORUS: CPT

## 2018-08-28 ENCOUNTER — APPOINTMENT (OUTPATIENT)
Dept: ADMISSIONS | Facility: MEDICAL CENTER | Age: 68
DRG: 920 | End: 2018-08-28
Attending: INTERNAL MEDICINE
Payer: MEDICARE

## 2018-08-28 DIAGNOSIS — Z01.812 PRE-PROCEDURAL LABORATORY EXAMINATION: ICD-10-CM

## 2018-08-28 LAB
INR PPP: 1.01 (ref 0.87–1.13)
PROTHROMBIN TIME: 13 SEC (ref 12–14.6)

## 2018-08-28 PROCEDURE — 36415 COLL VENOUS BLD VENIPUNCTURE: CPT

## 2018-08-28 PROCEDURE — 85610 PROTHROMBIN TIME: CPT

## 2018-08-29 ENCOUNTER — APPOINTMENT (OUTPATIENT)
Dept: RADIOLOGY | Facility: MEDICAL CENTER | Age: 68
DRG: 920 | End: 2018-08-29
Attending: NURSE PRACTITIONER
Payer: MEDICARE

## 2018-08-29 ENCOUNTER — HOSPITAL ENCOUNTER (OUTPATIENT)
Facility: MEDICAL CENTER | Age: 68
DRG: 920 | End: 2018-08-29
Attending: INTERNAL MEDICINE | Admitting: INTERNAL MEDICINE
Payer: MEDICARE

## 2018-08-29 VITALS
WEIGHT: 142 LBS | SYSTOLIC BLOOD PRESSURE: 114 MMHG | HEIGHT: 62 IN | TEMPERATURE: 97.4 F | OXYGEN SATURATION: 98 % | RESPIRATION RATE: 14 BRPM | DIASTOLIC BLOOD PRESSURE: 62 MMHG | BODY MASS INDEX: 26.13 KG/M2 | HEART RATE: 76 BPM

## 2018-08-29 DIAGNOSIS — R80.9 PROTEINURIA, UNSPECIFIED TYPE: ICD-10-CM

## 2018-08-29 LAB — GLUCOSE BLD-MCNC: 120 MG/DL (ref 65–99)

## 2018-08-29 PROCEDURE — 88300 SURGICAL PATH GROSS: CPT

## 2018-08-29 PROCEDURE — 50200 RENAL BIOPSY PERQ: CPT

## 2018-08-29 PROCEDURE — 88346 IMFLUOR 1ST 1ANTB STAIN PX: CPT

## 2018-08-29 PROCEDURE — 88348 ELECTRON MICROSCOPY DX: CPT

## 2018-08-29 PROCEDURE — 82962 GLUCOSE BLOOD TEST: CPT

## 2018-08-29 PROCEDURE — 700111 HCHG RX REV CODE 636 W/ 250 OVERRIDE (IP)

## 2018-08-29 PROCEDURE — 160002 HCHG RECOVERY MINUTES (STAT)

## 2018-08-29 PROCEDURE — 88305 TISSUE EXAM BY PATHOLOGIST: CPT

## 2018-08-29 PROCEDURE — 88313 SPECIAL STAINS GROUP 2: CPT | Mod: 91

## 2018-08-29 RX ORDER — OXYCODONE HYDROCHLORIDE 10 MG/1
10 TABLET ORAL
Status: DISCONTINUED | OUTPATIENT
Start: 2018-08-29 | End: 2018-08-29 | Stop reason: HOSPADM

## 2018-08-29 RX ORDER — MIDAZOLAM HYDROCHLORIDE 1 MG/ML
.5-2 INJECTION INTRAMUSCULAR; INTRAVENOUS PRN
Status: ACTIVE | OUTPATIENT
Start: 2018-08-29 | End: 2018-08-29

## 2018-08-29 RX ORDER — OXYCODONE HYDROCHLORIDE 5 MG/1
5 TABLET ORAL
Status: DISCONTINUED | OUTPATIENT
Start: 2018-08-29 | End: 2018-08-29 | Stop reason: HOSPADM

## 2018-08-29 RX ORDER — MORPHINE SULFATE 4 MG/ML
4 INJECTION, SOLUTION INTRAMUSCULAR; INTRAVENOUS
Status: DISCONTINUED | OUTPATIENT
Start: 2018-08-29 | End: 2018-08-29 | Stop reason: HOSPADM

## 2018-08-29 RX ORDER — MIDAZOLAM HYDROCHLORIDE 1 MG/ML
INJECTION INTRAMUSCULAR; INTRAVENOUS
Status: COMPLETED
Start: 2018-08-29 | End: 2018-08-29

## 2018-08-29 RX ORDER — SODIUM CHLORIDE 9 MG/ML
500 INJECTION, SOLUTION INTRAVENOUS
Status: ACTIVE | OUTPATIENT
Start: 2018-08-29 | End: 2018-08-29

## 2018-08-29 RX ORDER — ONDANSETRON 2 MG/ML
4 INJECTION INTRAMUSCULAR; INTRAVENOUS EVERY 8 HOURS PRN
Status: DISCONTINUED | OUTPATIENT
Start: 2018-08-29 | End: 2018-08-29 | Stop reason: HOSPADM

## 2018-08-29 RX ORDER — ONDANSETRON 2 MG/ML
4 INJECTION INTRAMUSCULAR; INTRAVENOUS PRN
Status: ACTIVE | OUTPATIENT
Start: 2018-08-29 | End: 2018-08-29

## 2018-08-29 RX ORDER — SODIUM CHLORIDE 9 MG/ML
INJECTION, SOLUTION INTRAVENOUS
Status: DISCONTINUED | OUTPATIENT
Start: 2018-08-29 | End: 2018-08-29 | Stop reason: HOSPADM

## 2018-08-29 RX ORDER — NALOXONE HYDROCHLORIDE 0.4 MG/ML
INJECTION, SOLUTION INTRAMUSCULAR; INTRAVENOUS; SUBCUTANEOUS
Status: COMPLETED
Start: 2018-08-29 | End: 2018-08-29

## 2018-08-29 RX ORDER — ONDANSETRON 2 MG/ML
INJECTION INTRAMUSCULAR; INTRAVENOUS
Status: COMPLETED
Start: 2018-08-29 | End: 2018-08-29

## 2018-08-29 RX ADMIN — ONDANSETRON 4 MG: 2 INJECTION INTRAMUSCULAR; INTRAVENOUS at 09:30

## 2018-08-29 RX ADMIN — MIDAZOLAM 1 MG: 1 INJECTION INTRAMUSCULAR; INTRAVENOUS at 08:41

## 2018-08-29 RX ADMIN — SODIUM CHLORIDE: 9 INJECTION, SOLUTION INTRAVENOUS at 07:50

## 2018-08-29 RX ADMIN — FENTANYL CITRATE 25 MCG: 50 INJECTION, SOLUTION INTRAMUSCULAR; INTRAVENOUS at 08:41

## 2018-08-29 RX ADMIN — MIDAZOLAM HYDROCHLORIDE 1 MG: 1 INJECTION INTRAMUSCULAR; INTRAVENOUS at 08:41

## 2018-08-29 ASSESSMENT — PAIN SCALES - GENERAL
PAINLEVEL_OUTOF10: 0

## 2018-08-29 NOTE — OR SURGEON
Immediate Post- Operative Note        PostOp Diagnosis: KIDNEY DYSFUNCTION    Procedure(s): ULTRASOUND LEFT RENAL BIOPSY      Estimated Blood Loss: Less than 5 ml        Complications: None            8/29/2018     8:58 AM     Duncan Dow

## 2018-08-29 NOTE — PROGRESS NOTES
IR Procedure Note:    Site Marked and Confirmed with MD, patient and RN pre procedure.     Dr. Dow performed an ultrasound guided non-targeted renal biopsy of left kidney with cores confirmed by pathology.  Cores x 3 taken by pathology.      The pt tolerated the procedure well.  Bandaid applied to left lower back, CDI and soft; pressure held x 5 minutes.  Pt alert, oriented and verbally appropriate post procedure, vital signs stable during procedure and transport, see flow sheet for vital signs.  Report given to Lobo.  RN transported pt to PPU with Sao2 monitor =100% on oxygen via NC @ 2 lpm.
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
## 2018-08-29 NOTE — OR NURSING
0907   PATIENT RECEIVED FROM IR,  S/P LEFT KIDNEY BX.  BX SITE WITH BAND-AID INTACT.  DENIES ANY PAIN.  FAMILY IS @ BEDSIDE.    0930   PATIENT C/O OF NAUSEA.  MEDICATED WITH ZOFRAN PER MAR'S.  SBP DOWN TO 70'S.  IVF GIVEN.    1000   SBP UP TO 'S.  PATIENT VERBALIZED NAUSEA RELIEF.  TAKING PO FLUID OK.    1100  PATIENT RESTING COMFORTABLY.  VS WNL.    1145   DC INSTRUCTIONS GIVEN TO PATIENT AND FAMILY.  VERBALIZED UNDERSTANDING OF ALL.  HL DC.  PATIENT DC TO HOME,  VIA WHEELCHAIR,  ESCORTED OUT BY VOLUNTEER.

## 2018-08-29 NOTE — DISCHARGE INSTRUCTIONS
ACTIVITY: Rest and take it easy for the first 24 hours.  A responsible adult is recommended to remain with you during that time.  It is normal to feel sleepy.  We encourage you to not do anything that requires balance, judgment or coordination.    MILD FLU-LIKE SYMPTOMS ARE NORMAL. YOU MAY EXPERIENCE GENERALIZED MUSCLE ACHES, THROAT IRRITATION, HEADACHE AND/OR SOME NAUSEA.    FOR 24 HOURS DO NOT:  Drive, operate machinery or run household appliances.  Drink beer or alcoholic beverages.   Make important decisions or sign legal documents.    SPECIAL INSTRUCTIONS: *KEEP INCISION DRY FOR 24HRS**    DIET: To avoid nausea, slowly advance diet as tolerated, avoiding spicy or greasy foods for the first day.  Add more substantial food to your diet according to your physician's instructions.  Babies can be fed formula or breast milk as soon as they are hungry.  INCREASE FLUIDS AND FIBER TO AVOID CONSTIPATION.    SURGICAL DRESSING/BATHING: *MAY SHOWER TOMORROW AFTERNOON THEN MAY REMOVE DRESSING.  NO BATHTUB NOR SWIMMING FOR 1 WEEK**    FOLLOW-UP APPOINTMENT:  A follow-up appointment should be arranged with your doctor in **DR ALAMO   [phone] *; call to schedule.    You should CALL YOUR PHYSICIAN if you develop:  Fever greater than 101 degrees F.  Pain not relieved by medication, or persistent nausea or vomiting.  Excessive bleeding (blood soaking through dressing) or unexpected drainage from the wound.  Extreme redness or swelling around the incision site, drainage of pus or foul smelling drainage.  Inability to urinate or empty your bladder within 8 hours.  Problems with breathing or chest pain.    You should call 911 if you develop problems with breathing or chest pain.  If you are unable to contact your doctor or surgical center, you should go to the nearest emergency room or urgent care center.  Physician's telephone #: *[phone]**    If any questions arise, call your doctor.  If your doctor is not available, please  feel free to call the Surgical Center at [phone].  The Center is open Monday through Friday from 7AM to 7PM.  You can also call the HEALTH HOTLINE open 24 hours/day, 7 days/week and speak to a nurse at [phone], or toll free at [phone].    A registered nurse may call you a few days after your surgery to see how you are doing after your procedure.    MEDICATIONS: Resume taking daily medication.  Take prescribed pain medication with food.  If no medication is prescribed, you may take non-aspirin pain medication if needed.  PAIN MEDICATION CAN BE VERY CONSTIPATING.  Take a stool softener or laxative such as senokot, pericolace, or milk of magnesia if needed.      If your physician has prescribed pain medication that includes Acetaminophen (Tylenol), do not take additional Acetaminophen (Tylenol) while taking the prescribed medication.    Depression / Suicide Risk    As you are discharged from this [facility] facility, it is important to learn how to keep safe from harming yourself.    Recognize the warning signs:  · Abrupt changes in personality, positive or negative- including increase in energy   · Giving away possessions  · Change in eating patterns- significant weight changes-  positive or negative  · Change in sleeping patterns- unable to sleep or sleeping all the time   · Unwillingness or inability to communicate  · Depression  · Unusual sadness, discouragement and loneliness  · Talk of wanting to die  · Neglect of personal appearance   · Rebelliousness- reckless behavior  · Withdrawal from people/activities they love  · Confusion- inability to concentrate     If you or a loved one observes any of these behaviors or has concerns about self-harm, here's what you can do:  · Talk about it- your feelings and reasons for harming yourself  · Remove any means that you might use to hurt yourself (examples: pills, rope, extension cords, firearm)  · Get professional help from the community (Mental  Health, Substance Abuse, psychological counseling)  · Do not be alone:Call your Safe Contact- someone whom you trust who will be there for you.  · Call your local CRISIS HOTLINE 314-7069 or 847-071-3651  · Call your local Children's Mobile Crisis Response Team [place] (600) 073-4671 or www.[employer]  · Call the toll free National Suicide Prevention Hotlines   · National Suicide Prevention Lifeline 698-359-HBEO (0663)  · National Hope Line Network 800-SUICIDE (211-9808)

## 2018-08-31 ENCOUNTER — HOSPITAL ENCOUNTER (INPATIENT)
Facility: MEDICAL CENTER | Age: 68
LOS: 3 days | DRG: 920 | End: 2018-09-03
Attending: EMERGENCY MEDICINE | Admitting: HOSPITALIST
Payer: MEDICARE

## 2018-08-31 ENCOUNTER — APPOINTMENT (OUTPATIENT)
Dept: RADIOLOGY | Facility: MEDICAL CENTER | Age: 68
DRG: 920 | End: 2018-08-31
Attending: EMERGENCY MEDICINE
Payer: MEDICARE

## 2018-08-31 DIAGNOSIS — S36.892A TRAUMATIC RETROPERITONEAL HEMATOMA, INITIAL ENCOUNTER: ICD-10-CM

## 2018-08-31 DIAGNOSIS — D64.9 ANEMIA, UNSPECIFIED TYPE: ICD-10-CM

## 2018-08-31 PROBLEM — G44.209 TENSION TYPE HEADACHE: Status: ACTIVE | Noted: 2018-08-31

## 2018-08-31 PROBLEM — D62 ACUTE BLOOD LOSS ANEMIA: Status: ACTIVE | Noted: 2018-08-31

## 2018-08-31 PROBLEM — Z98.890 STATUS POST BIOPSY OF KIDNEY: Status: ACTIVE | Noted: 2018-08-31

## 2018-08-31 PROBLEM — E78.5 DYSLIPIDEMIA: Status: ACTIVE | Noted: 2018-08-31

## 2018-08-31 PROBLEM — K68.3 RETROPERITONEAL HEMATOMA: Status: ACTIVE | Noted: 2018-08-31

## 2018-08-31 LAB
ABO GROUP BLD: NORMAL
ABO GROUP BLD: NORMAL
ALBUMIN SERPL BCP-MCNC: 3.4 G/DL (ref 3.2–4.9)
ALBUMIN/GLOB SERPL: 0.9 G/DL
ALP SERPL-CCNC: 115 U/L (ref 30–99)
ALT SERPL-CCNC: 8 U/L (ref 2–50)
ANION GAP SERPL CALC-SCNC: 7 MMOL/L (ref 0–11.9)
AST SERPL-CCNC: 7 U/L (ref 12–45)
BASOPHILS # BLD AUTO: 0.3 % (ref 0–1.8)
BASOPHILS # BLD: 0.02 K/UL (ref 0–0.12)
BILIRUB SERPL-MCNC: 0.5 MG/DL (ref 0.1–1.5)
BLD GP AB SCN SERPL QL: NORMAL
BUN SERPL-MCNC: 21 MG/DL (ref 8–22)
CALCIUM SERPL-MCNC: 8.9 MG/DL (ref 8.5–10.5)
CHLORIDE SERPL-SCNC: 111 MMOL/L (ref 96–112)
CO2 SERPL-SCNC: 18 MMOL/L (ref 20–33)
CREAT SERPL-MCNC: 1.42 MG/DL (ref 0.5–1.4)
DAT C3D-SP REAG RBC QL: NORMAL
DAT IGG-SP REAG RBC QL: NORMAL
EKG IMPRESSION: NORMAL
EOSINOPHIL # BLD AUTO: 0.06 K/UL (ref 0–0.51)
EOSINOPHIL NFR BLD: 0.9 % (ref 0–6.9)
ERYTHROCYTE [DISTWIDTH] IN BLOOD BY AUTOMATED COUNT: 46.6 FL (ref 35.9–50)
GLOBULIN SER CALC-MCNC: 3.6 G/DL (ref 1.9–3.5)
GLUCOSE BLD-MCNC: 136 MG/DL (ref 65–99)
GLUCOSE BLD-MCNC: 149 MG/DL (ref 65–99)
GLUCOSE SERPL-MCNC: 140 MG/DL (ref 65–99)
HCT VFR BLD AUTO: 19.4 % (ref 37–47)
HGB BLD-MCNC: 6.7 G/DL (ref 12–16)
IMM GRANULOCYTES # BLD AUTO: 0.05 K/UL (ref 0–0.11)
IMM GRANULOCYTES NFR BLD AUTO: 0.8 % (ref 0–0.9)
LIPASE SERPL-CCNC: 44 U/L (ref 11–82)
LYMPHOCYTES # BLD AUTO: 1.66 K/UL (ref 1–4.8)
LYMPHOCYTES NFR BLD: 26.2 % (ref 22–41)
MCH RBC QN AUTO: 32.2 PG (ref 27–33)
MCHC RBC AUTO-ENTMCNC: 33 G/DL (ref 33.6–35)
MCV RBC AUTO: 97.6 FL (ref 81.4–97.8)
MONOCYTES # BLD AUTO: 0.3 K/UL (ref 0–0.85)
MONOCYTES NFR BLD AUTO: 4.7 % (ref 0–13.4)
NEUTROPHILS # BLD AUTO: 4.24 K/UL (ref 2–7.15)
NEUTROPHILS NFR BLD: 67.1 % (ref 44–72)
NRBC # BLD AUTO: 0.02 K/UL
NRBC BLD-RTO: 0.3 /100 WBC
PLATELET # BLD AUTO: 172 K/UL (ref 164–446)
PMV BLD AUTO: 9.9 FL (ref 9–12.9)
POTASSIUM SERPL-SCNC: 4.6 MMOL/L (ref 3.6–5.5)
PROT SERPL-MCNC: 7 G/DL (ref 6–8.2)
RBC # BLD AUTO: 2.05 M/UL (ref 4.2–5.4)
RH BLD: NORMAL
RH BLD: NORMAL
SODIUM SERPL-SCNC: 136 MMOL/L (ref 135–145)
WBC # BLD AUTO: 6.3 K/UL (ref 4.8–10.8)

## 2018-08-31 PROCEDURE — 96375 TX/PRO/DX INJ NEW DRUG ADDON: CPT

## 2018-08-31 PROCEDURE — 82962 GLUCOSE BLOOD TEST: CPT

## 2018-08-31 PROCEDURE — 304561 HCHG STAT O2

## 2018-08-31 PROCEDURE — 86900 BLOOD TYPING SEROLOGIC ABO: CPT

## 2018-08-31 PROCEDURE — 700102 HCHG RX REV CODE 250 W/ 637 OVERRIDE(OP): Performed by: HOSPITALIST

## 2018-08-31 PROCEDURE — 85018 HEMOGLOBIN: CPT

## 2018-08-31 PROCEDURE — 700111 HCHG RX REV CODE 636 W/ 250 OVERRIDE (IP): Performed by: HOSPITALIST

## 2018-08-31 PROCEDURE — 36415 COLL VENOUS BLD VENIPUNCTURE: CPT

## 2018-08-31 PROCEDURE — 93005 ELECTROCARDIOGRAM TRACING: CPT | Performed by: EMERGENCY MEDICINE

## 2018-08-31 PROCEDURE — 770020 HCHG ROOM/CARE - TELE (206)

## 2018-08-31 PROCEDURE — 93005 ELECTROCARDIOGRAM TRACING: CPT

## 2018-08-31 PROCEDURE — A9270 NON-COVERED ITEM OR SERVICE: HCPCS | Performed by: HOSPITALIST

## 2018-08-31 PROCEDURE — 96374 THER/PROPH/DIAG INJ IV PUSH: CPT

## 2018-08-31 PROCEDURE — 86850 RBC ANTIBODY SCREEN: CPT

## 2018-08-31 PROCEDURE — 71260 CT THORAX DX C+: CPT

## 2018-08-31 PROCEDURE — 700117 HCHG RX CONTRAST REV CODE 255: Performed by: EMERGENCY MEDICINE

## 2018-08-31 PROCEDURE — 86880 COOMBS TEST DIRECT: CPT | Mod: 91

## 2018-08-31 PROCEDURE — 700111 HCHG RX REV CODE 636 W/ 250 OVERRIDE (IP): Performed by: EMERGENCY MEDICINE

## 2018-08-31 PROCEDURE — 80053 COMPREHEN METABOLIC PANEL: CPT

## 2018-08-31 PROCEDURE — 99285 EMERGENCY DEPT VISIT HI MDM: CPT

## 2018-08-31 PROCEDURE — 700105 HCHG RX REV CODE 258: Performed by: HOSPITALIST

## 2018-08-31 PROCEDURE — 700105 HCHG RX REV CODE 258: Performed by: EMERGENCY MEDICINE

## 2018-08-31 PROCEDURE — 86901 BLOOD TYPING SEROLOGIC RH(D): CPT

## 2018-08-31 PROCEDURE — 99223 1ST HOSP IP/OBS HIGH 75: CPT | Performed by: HOSPITALIST

## 2018-08-31 PROCEDURE — 83690 ASSAY OF LIPASE: CPT

## 2018-08-31 PROCEDURE — 85014 HEMATOCRIT: CPT

## 2018-08-31 PROCEDURE — 85025 COMPLETE CBC W/AUTO DIFF WBC: CPT

## 2018-08-31 PROCEDURE — 30233N1 TRANSFUSION OF NONAUTOLOGOUS RED BLOOD CELLS INTO PERIPHERAL VEIN, PERCUTANEOUS APPROACH: ICD-10-PCS | Performed by: HOSPITALIST

## 2018-08-31 RX ORDER — DEXTROSE MONOHYDRATE 25 G/50ML
25 INJECTION, SOLUTION INTRAVENOUS
Status: DISCONTINUED | OUTPATIENT
Start: 2018-08-31 | End: 2018-09-01

## 2018-08-31 RX ORDER — INSULIN GLARGINE 100 [IU]/ML
30 INJECTION, SOLUTION SUBCUTANEOUS
Status: DISCONTINUED | OUTPATIENT
Start: 2018-08-31 | End: 2018-09-03 | Stop reason: HOSPADM

## 2018-08-31 RX ORDER — FAMOTIDINE 20 MG/1
20 TABLET, FILM COATED ORAL DAILY
Status: DISCONTINUED | OUTPATIENT
Start: 2018-09-01 | End: 2018-09-03 | Stop reason: HOSPADM

## 2018-08-31 RX ORDER — ONDANSETRON 4 MG/1
4 TABLET, ORALLY DISINTEGRATING ORAL EVERY 4 HOURS PRN
Status: DISCONTINUED | OUTPATIENT
Start: 2018-08-31 | End: 2018-09-03 | Stop reason: HOSPADM

## 2018-08-31 RX ORDER — SODIUM CHLORIDE 9 MG/ML
INJECTION, SOLUTION INTRAVENOUS CONTINUOUS
Status: DISCONTINUED | OUTPATIENT
Start: 2018-08-31 | End: 2018-09-03 | Stop reason: HOSPADM

## 2018-08-31 RX ORDER — ONDANSETRON 2 MG/ML
4 INJECTION INTRAMUSCULAR; INTRAVENOUS ONCE
Status: COMPLETED | OUTPATIENT
Start: 2018-08-31 | End: 2018-08-31

## 2018-08-31 RX ORDER — POLYETHYLENE GLYCOL 3350 17 G/17G
1 POWDER, FOR SOLUTION ORAL
Status: DISCONTINUED | OUTPATIENT
Start: 2018-08-31 | End: 2018-09-03 | Stop reason: HOSPADM

## 2018-08-31 RX ORDER — OXYCODONE HYDROCHLORIDE 5 MG/1
5 TABLET ORAL EVERY 4 HOURS PRN
Status: DISCONTINUED | OUTPATIENT
Start: 2018-08-31 | End: 2018-09-03 | Stop reason: HOSPADM

## 2018-08-31 RX ORDER — ACETAMINOPHEN 325 MG/1
650 TABLET ORAL EVERY 4 HOURS PRN
Status: DISCONTINUED | OUTPATIENT
Start: 2018-08-31 | End: 2018-09-03 | Stop reason: HOSPADM

## 2018-08-31 RX ORDER — OXYCODONE HYDROCHLORIDE 10 MG/1
10 TABLET ORAL EVERY 4 HOURS PRN
Status: DISCONTINUED | OUTPATIENT
Start: 2018-08-31 | End: 2018-09-03 | Stop reason: HOSPADM

## 2018-08-31 RX ORDER — AMLODIPINE BESYLATE 10 MG/1
10 TABLET ORAL
Status: DISCONTINUED | OUTPATIENT
Start: 2018-08-31 | End: 2018-09-03 | Stop reason: HOSPADM

## 2018-08-31 RX ORDER — PRAVASTATIN SODIUM 20 MG
10 TABLET ORAL
Status: DISCONTINUED | OUTPATIENT
Start: 2018-08-31 | End: 2018-09-03 | Stop reason: HOSPADM

## 2018-08-31 RX ORDER — LEVOTHYROXINE SODIUM 112 UG/1
112 TABLET ORAL
COMMUNITY

## 2018-08-31 RX ORDER — LEVOTHYROXINE SODIUM 112 UG/1
112 TABLET ORAL
Status: DISCONTINUED | OUTPATIENT
Start: 2018-09-01 | End: 2018-09-03 | Stop reason: HOSPADM

## 2018-08-31 RX ORDER — SODIUM CHLORIDE 9 MG/ML
INJECTION, SOLUTION INTRAVENOUS CONTINUOUS
Status: DISCONTINUED | OUTPATIENT
Start: 2018-08-31 | End: 2018-08-31

## 2018-08-31 RX ORDER — BISACODYL 10 MG
10 SUPPOSITORY, RECTAL RECTAL
Status: DISCONTINUED | OUTPATIENT
Start: 2018-08-31 | End: 2018-09-03 | Stop reason: HOSPADM

## 2018-08-31 RX ORDER — ACETAMINOPHEN 650 MG/1
650 SUPPOSITORY RECTAL EVERY 4 HOURS PRN
Status: DISCONTINUED | OUTPATIENT
Start: 2018-08-31 | End: 2018-09-03 | Stop reason: HOSPADM

## 2018-08-31 RX ORDER — AMOXICILLIN 250 MG
2 CAPSULE ORAL 2 TIMES DAILY
Status: DISCONTINUED | OUTPATIENT
Start: 2018-08-31 | End: 2018-09-03 | Stop reason: HOSPADM

## 2018-08-31 RX ORDER — ONDANSETRON 2 MG/ML
4 INJECTION INTRAMUSCULAR; INTRAVENOUS EVERY 4 HOURS PRN
Status: DISCONTINUED | OUTPATIENT
Start: 2018-08-31 | End: 2018-09-03 | Stop reason: HOSPADM

## 2018-08-31 RX ADMIN — PRAVASTATIN SODIUM 10 MG: 20 TABLET ORAL at 20:16

## 2018-08-31 RX ADMIN — IOHEXOL 85 ML: 350 INJECTION, SOLUTION INTRAVENOUS at 14:43

## 2018-08-31 RX ADMIN — SODIUM CHLORIDE: 9 INJECTION, SOLUTION INTRAVENOUS at 12:27

## 2018-08-31 RX ADMIN — ACETAMINOPHEN 650 MG: 325 TABLET, FILM COATED ORAL at 18:40

## 2018-08-31 RX ADMIN — OXYCODONE HYDROCHLORIDE 5 MG: 5 TABLET ORAL at 20:15

## 2018-08-31 RX ADMIN — SODIUM CHLORIDE: 9 INJECTION, SOLUTION INTRAVENOUS at 18:08

## 2018-08-31 RX ADMIN — INSULIN GLARGINE 30 UNITS: 100 INJECTION, SOLUTION SUBCUTANEOUS at 20:17

## 2018-08-31 RX ADMIN — FENTANYL CITRATE 50 MCG: 50 INJECTION INTRAMUSCULAR; INTRAVENOUS at 12:28

## 2018-08-31 RX ADMIN — AMLODIPINE BESYLATE 10 MG: 10 TABLET ORAL at 20:16

## 2018-08-31 RX ADMIN — ONDANSETRON 4 MG: 2 INJECTION INTRAMUSCULAR; INTRAVENOUS at 12:27

## 2018-08-31 RX ADMIN — ONDANSETRON 4 MG: 2 INJECTION INTRAMUSCULAR; INTRAVENOUS at 18:08

## 2018-08-31 ASSESSMENT — LIFESTYLE VARIABLES
ALCOHOL_USE: NO
EVER_SMOKED: NEVER

## 2018-08-31 ASSESSMENT — COGNITIVE AND FUNCTIONAL STATUS - GENERAL
DRESSING REGULAR LOWER BODY CLOTHING: A LITTLE
TURNING FROM BACK TO SIDE WHILE IN FLAT BAD: A LITTLE
PERSONAL GROOMING: A LITTLE
DAILY ACTIVITIY SCORE: 22
SUGGESTED CMS G CODE MODIFIER DAILY ACTIVITY: CJ
MOVING FROM LYING ON BACK TO SITTING ON SIDE OF FLAT BED: A LITTLE
SUGGESTED CMS G CODE MODIFIER MOBILITY: CJ
MOBILITY SCORE: 21
CLIMB 3 TO 5 STEPS WITH RAILING: A LITTLE

## 2018-08-31 ASSESSMENT — ENCOUNTER SYMPTOMS
PSYCHIATRIC NEGATIVE: 1
EYES NEGATIVE: 1
MUSCULOSKELETAL NEGATIVE: 1
HEADACHES: 1
RESPIRATORY NEGATIVE: 1
WEIGHT LOSS: 0
ABDOMINAL PAIN: 1
CARDIOVASCULAR NEGATIVE: 1

## 2018-08-31 ASSESSMENT — PAIN SCALES - GENERAL
PAINLEVEL_OUTOF10: 8
PAINLEVEL_OUTOF10: 0
PAINLEVEL_OUTOF10: 8
PAINLEVEL_OUTOF10: 8

## 2018-08-31 ASSESSMENT — PATIENT HEALTH QUESTIONNAIRE - PHQ9
2. FEELING DOWN, DEPRESSED, IRRITABLE, OR HOPELESS: NOT AT ALL
1. LITTLE INTEREST OR PLEASURE IN DOING THINGS: NOT AT ALL
SUM OF ALL RESPONSES TO PHQ9 QUESTIONS 1 AND 2: 0

## 2018-08-31 NOTE — ED NOTES
Consent for blood transfusion signed by Pt, , language pad, used to explain risks and benefits of blood transfusion.

## 2018-08-31 NOTE — ED TRIAGE NOTES
"Chief Complaint   Patient presents with   • Post-Op Complications     LT sided kidney biopsy on Wednesday, following procedure developed generalized abd pain and upper back between shoulder blades/mid-back pain.   • Abdominal Pain   • Back Pain   • Chest Pain     reports LT sided intermittent chest pain today.     BIB family to triage with above. Yoruba speaking. Biopsy site clean, dry and intact. Denies pain near site.     Pt to senior carissa. Educated on triage process and to inform staff of any changes.     /64   Pulse 93   Temp 36.9 °C (98.4 °F)   Resp 18   Ht 1.575 m (5' 2\")   SpO2 97%     "

## 2018-08-31 NOTE — ED NOTES
Travis from Lab called with critical result of HCT 19.4 at Hg 6.7. Critical lab result read back to Travis.   Dr. Plata notified of critical lab result at 13:38.  Critical lab result read back by Dr. Plata.

## 2018-08-31 NOTE — ED NOTES
Med rec completed per rx bottles at bedside-reviewed with pt.   Antibiotics within last 30 days: No  Patient allergies have been reviewed: Yes.  Comments: Pt's family will be taking medications back home.
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
## 2018-08-31 NOTE — ED NOTES
Spoke with blood bank, unknown antibody present therefore a delay in the release of blood for 45 minutes to 3 hours per technician.

## 2018-09-01 LAB
CFT BLD TEG: 11.3 MIN (ref 5–10)
CLOT ANGLE BLD TEG: 57.7 DEGREES (ref 53–72)
CLOT LYSIS 30M P MA LENFR BLD TEG: 0 % (ref 0–8)
CT.EXTRINSIC BLD ROTEM: 2.3 MIN (ref 1–3)
GLUCOSE BLD-MCNC: 107 MG/DL (ref 65–99)
GLUCOSE BLD-MCNC: 121 MG/DL (ref 65–99)
HCT VFR BLD AUTO: 16.5 % (ref 37–47)
HCT VFR BLD AUTO: 17.3 % (ref 37–47)
HCT VFR BLD AUTO: 17.8 % (ref 37–47)
HCT VFR BLD AUTO: 17.9 % (ref 37–47)
HCT VFR BLD AUTO: 18.5 % (ref 37–47)
HCT VFR BLD AUTO: 18.8 % (ref 37–47)
HCT VFR BLD AUTO: 18.8 % (ref 37–47)
HGB BLD-MCNC: 5.3 G/DL (ref 12–16)
HGB BLD-MCNC: 5.7 G/DL (ref 12–16)
HGB BLD-MCNC: 5.8 G/DL (ref 12–16)
HGB BLD-MCNC: 6 G/DL (ref 12–16)
HGB BLD-MCNC: 6.1 G/DL (ref 12–16)
HGB BLD-MCNC: 6.2 G/DL (ref 12–16)
HGB BLD-MCNC: 6.2 G/DL (ref 12–16)
MCF BLD TEG: 71.6 MM (ref 50–70)
TEG ALGORITHM TGALG: ABNORMAL

## 2018-09-01 PROCEDURE — 85347 COAGULATION TIME ACTIVATED: CPT

## 2018-09-01 PROCEDURE — 85014 HEMATOCRIT: CPT

## 2018-09-01 PROCEDURE — 85384 FIBRINOGEN ACTIVITY: CPT

## 2018-09-01 PROCEDURE — 700105 HCHG RX REV CODE 258: Performed by: HOSPITALIST

## 2018-09-01 PROCEDURE — 82962 GLUCOSE BLOOD TEST: CPT

## 2018-09-01 PROCEDURE — 99233 SBSQ HOSP IP/OBS HIGH 50: CPT | Performed by: INTERNAL MEDICINE

## 2018-09-01 PROCEDURE — 94760 N-INVAS EAR/PLS OXIMETRY 1: CPT

## 2018-09-01 PROCEDURE — A9270 NON-COVERED ITEM OR SERVICE: HCPCS | Performed by: HOSPITALIST

## 2018-09-01 PROCEDURE — 85018 HEMOGLOBIN: CPT | Mod: 91

## 2018-09-01 PROCEDURE — 85576 BLOOD PLATELET AGGREGATION: CPT

## 2018-09-01 PROCEDURE — 700102 HCHG RX REV CODE 250 W/ 637 OVERRIDE(OP): Performed by: HOSPITALIST

## 2018-09-01 PROCEDURE — 36415 COLL VENOUS BLD VENIPUNCTURE: CPT

## 2018-09-01 PROCEDURE — 770020 HCHG ROOM/CARE - TELE (206)

## 2018-09-01 PROCEDURE — 700102 HCHG RX REV CODE 250 W/ 637 OVERRIDE(OP): Performed by: INTERNAL MEDICINE

## 2018-09-01 RX ORDER — DEXTROSE MONOHYDRATE 25 G/50ML
25 INJECTION, SOLUTION INTRAVENOUS
Status: DISCONTINUED | OUTPATIENT
Start: 2018-09-01 | End: 2018-09-03 | Stop reason: HOSPADM

## 2018-09-01 RX ADMIN — AMLODIPINE BESYLATE 10 MG: 10 TABLET ORAL at 20:15

## 2018-09-01 RX ADMIN — SODIUM CHLORIDE: 9 INJECTION, SOLUTION INTRAVENOUS at 19:14

## 2018-09-01 RX ADMIN — PRAVASTATIN SODIUM 10 MG: 20 TABLET ORAL at 20:15

## 2018-09-01 RX ADMIN — INSULIN GLARGINE 30 UNITS: 100 INJECTION, SOLUTION SUBCUTANEOUS at 20:15

## 2018-09-01 RX ADMIN — SENNOSIDES AND DOCUSATE SODIUM 2 TABLET: 8.6; 5 TABLET ORAL at 17:11

## 2018-09-01 RX ADMIN — SODIUM CHLORIDE: 9 INJECTION, SOLUTION INTRAVENOUS at 06:00

## 2018-09-01 RX ADMIN — OXYCODONE HYDROCHLORIDE 10 MG: 10 TABLET ORAL at 11:24

## 2018-09-01 RX ADMIN — FAMOTIDINE 20 MG: 20 TABLET ORAL at 05:55

## 2018-09-01 RX ADMIN — SENNOSIDES AND DOCUSATE SODIUM 2 TABLET: 8.6; 5 TABLET ORAL at 05:55

## 2018-09-01 RX ADMIN — OXYCODONE HYDROCHLORIDE 5 MG: 5 TABLET ORAL at 21:50

## 2018-09-01 RX ADMIN — ACETAMINOPHEN 650 MG: 325 TABLET, FILM COATED ORAL at 11:22

## 2018-09-01 RX ADMIN — LEVOTHYROXINE SODIUM 112 MCG: 112 TABLET ORAL at 05:55

## 2018-09-01 ASSESSMENT — COPD QUESTIONNAIRES
COPD SCREENING SCORE: 2
DO YOU EVER COUGH UP ANY MUCUS OR PHLEGM?: NO/ONLY WITH OCCASIONAL COLDS OR INFECTIONS
DURING THE PAST 4 WEEKS HOW MUCH DID YOU FEEL SHORT OF BREATH: NONE/LITTLE OF THE TIME
HAVE YOU SMOKED AT LEAST 100 CIGARETTES IN YOUR ENTIRE LIFE: NO/DON'T KNOW

## 2018-09-01 ASSESSMENT — PAIN SCALES - GENERAL
PAINLEVEL_OUTOF10: 2
PAINLEVEL_OUTOF10: 0
PAINLEVEL_OUTOF10: 0
PAINLEVEL_OUTOF10: 6
PAINLEVEL_OUTOF10: 4
PAINLEVEL_OUTOF10: 5

## 2018-09-01 ASSESSMENT — LIFESTYLE VARIABLES: EVER_SMOKED: NEVER

## 2018-09-01 NOTE — PROGRESS NOTES
Received report from previous shift RN. Patient AOX4 with complaint to abdomen area. PRN medication given will continue to monitor

## 2018-09-01 NOTE — PROGRESS NOTES
Rounded with Dr. Thomas. Awaiting lab results for next step of care. Will continue to monitor patient for s/s of hypovolemia and anemia.

## 2018-09-01 NOTE — PROGRESS NOTES
Spoke with Dr. Medrano regarding patient's condition no further order was given. Told by Dr. Medrano that patient would be assessed by her in person. Will continue to monitor and patient still hemodynamically stable

## 2018-09-01 NOTE — PROGRESS NOTES
Pt brought to floor from ER, with flex RN. Family at bedside, Vincentian speaking only. Pt placed on monitor, confirmed with tele room. Pt oriented to room and floor, safety and fall precautions in place, call light in reach will continue to monitor

## 2018-09-01 NOTE — PROGRESS NOTES
Performed rounds with Dr. Thomas. Utilized translation services. Will continue to monitor labs and orders.

## 2018-09-01 NOTE — PROGRESS NOTES
[facility] Hospitalist Progress Note    Date of Service: 2018    Chief Complaint  68 y.o. female with hypothyroidism, hypertension, hyperlipidemia, who underwent a recent kidney biopsy on  for workup for renal insufficiency, admitted 2018 with diffuse abdominal pain.  Hemoglobin was 6.7.  Creatinine was 1.42 which was stable. CT scan of the abdomen showed 1.7 x 9.7 cm left anterior pararenal retroperitoneal hematoma.  Patient ordered for transfusion 1 unit packed RBC.  Hemoglobin followed.  Started on pain medications.    Interval Problem Update  2018 - no overnight events. Remains hemodynamically stable and afebrile. Stable on 2L O2 NC.  Hemoglobin dropped to 5.8, awaiting blood transfusion as delayed due to antibodies present.    > Seen and examined.  Has pain on the abdomen, diffuse, rated 4 out of 10 in severity.  Feels bloated.  Denied any nausea, vomiting, chest pain, shortness of breath.      Consultants/Specialty  None    Disposition  Monitor on telemetry.        ROS     Pertinent positives/negatives as mentioned above.     A complete review of systems was done. All other systems were negative.      Physical Exam  Laboratory/Imaging   Hemodynamics  Temp (24hrs), Av °C (98.6 °F), Min:36.5 °C (97.7 °F), Max:37.7 °C (99.8 °F)   Temperature: 37.7 °C (99.8 °F)  Pulse  Av.7  Min: 81  Max: 106 Heart Rate (Monitored): 93  Blood Pressure : 123/59, NIBP: 160/59      Respiratory      Respiration: 16, Pulse Oximetry: 96 %, O2 Daily Delivery Respiratory : Silicone Nasal Cannula     Work Of Breathing / Effort: Mild  RUL Breath Sounds: Clear, RML Breath Sounds: Clear, RLL Breath Sounds: Clear, KIRSTY Breath Sounds: Clear, LLL Breath Sounds: Clear    Fluids    Intake/Output Summary (Last 24 hours) at 18 0805  Last data filed at 18 0400   Gross per 24 hour   Intake              300 ml   Output                0 ml   Net              300 ml       Nutrition  Orders Placed This Encounter    Procedures   • Diet Order Diabetic     Standing Status:   Standing     Number of Occurrences:   1     Order Specific Question:   Diet:     Answer:   Diabetic [3]     Physical Exam   Constitutional: She is oriented to person, place, and time. She appears well-developed and well-nourished. No distress.   HENT:   Head: Normocephalic and atraumatic.   Mouth/Throat: No oropharyngeal exudate.   Eyes: Pupils are equal, round, and reactive to light. Conjunctivae are normal. No scleral icterus.   Neck: Normal range of motion. Neck supple.   Cardiovascular: Normal rate and regular rhythm.  Exam reveals no gallop and no friction rub.    No murmur heard.  Pulmonary/Chest: Effort normal and breath sounds normal. No respiratory distress. She has no wheezes. She has no rales. She exhibits no tenderness.   Abdominal: Soft. Bowel sounds are normal. She exhibits no distension. There is tenderness ( left flank/hemiabdomen.  No signs of peritonitis.). There is no rebound and no guarding.   Musculoskeletal: Normal range of motion. She exhibits no edema or tenderness.   Lymphadenopathy:     She has no cervical adenopathy.   Neurological: She is alert and oriented to person, place, and time. No cranial nerve deficit. Coordination normal.   Skin: Skin is warm and dry. No rash noted. She is not diaphoretic. No erythema. No pallor.   Psychiatric: She has a normal mood and affect. Her behavior is normal. Judgment and thought content normal.   Nursing note and vitals reviewed.        Recent Labs      08/31/18   1218  08/31/18   2344  09/01/18   0240  09/01/18   0642   WBC  6.3   --    --    --    RBC  2.05*   --    --    --    HEMOGLOBIN  6.7*  5.8*  6.2*  6.0*   HEMATOCRIT  19.4*  17.9*  18.8*  17.8*   MCV  97.6   --    --    --    MCH  32.2   --    --    --    MCHC  33.0*   --    --    --    RDW  46.6   --    --    --    PLATELETCT  172   --    --    --    MPV  9.9   --    --    --      Recent Labs      08/31/18   1218   SODIUM  136    POTASSIUM  4.6   CHLORIDE  111   CO2  18*   GLUCOSE  140*   BUN  21   CREATININE  1.42*   CALCIUM  8.9                      Assessment/Plan     * Left retroperitoneal hematoma following kidney biopsy- (present on admission)   Assessment & Plan    -Continue pain control with oxycodone.  Transfuse blood, and continue monitoring hemoglobin and hematocrit closely.  If hemoglobin continues to drop, needs repeat imaging to evaluate for enlarging hematoma, and consider surgical evaluation.  -Hold off on NSAIDs, pharmacologic DVT prophylaxis, antiplatelets.  -check TEG.        Acute blood loss anemia due to retroperitoneal hematoma- (present on admission)   Assessment & Plan    -Awaiting blood transfusion.  Patient has multiple antibodies delaying blood acquisition.  -Continue to monitor hemoglobin and hematocrit closely every 4 hours, transfuse if drops below 7.        Dyslipidemia- (present on admission)   Assessment & Plan    -Continue pravastatin.        Status post biopsy of kidney- (present on admission)   Assessment & Plan    -Continue pain control as above.        CKD (chronic kidney disease), stage III- (present on admission)   Assessment & Plan    -Creatinine appears stable.  Had recent kidney biopsy, showing diffuse and nodular diabetic glomerulosclerosis.  -Continue to monitor renal function.  Continue IV fluid rehydration.  - avoid nephrotoxins, and continue to renally dose all medications.        Hypothyroidism- (present on admission)   Assessment & Plan    -Continue home dose Synthroid.        Essential hypertension- (present on admission)   Assessment & Plan    -Continue home dose Norvasc.  Monitor blood pressure trend closely.        Type 2 diabetes mellitus with stage 3 chronic kidney disease, with long-term current use of insulin (HCC)- (present on admission)   Assessment & Plan    -Continue Lantus 30 units at bedtime.  Start high scale sliding scale insulin coverage.  Accu-Cheks before meals and at  bedtime.  Diabetic diet.              Quality-Core Measures   Reviewed items::  Labs reviewed, Medications reviewed and Radiology images reviewed  Glasgow catheter::  No Glasgow  DVT prophylaxis pharmacological::  Contraindicated - Anemia requiring blood transfusion and Contraindicated - High bleeding risk  Ulcer Prophylaxis::  Yes  Assessed for rehabilitation services:  Patient unable to tolerate rehabilitation therapeutic regimen

## 2018-09-01 NOTE — ASSESSMENT & PLAN NOTE
-Creatinine appears stable.  Had recent kidney biopsy, showing diffuse and nodular diabetic glomerulosclerosis.  -Continue to monitor renal function.  Eating well, encourage oral fluid intake.  - avoid nephrotoxins, and continue to renally dose all medications.

## 2018-09-01 NOTE — H&P
Hospital Medicine History & Physical Note    Date of Service  8/31/2018    Primary Care Physician  Pcp Pt States None    Consultants  none    Code Status  full    Chief Complaint  Abdominal pain    History of Presenting Illness  68 y.o. female who presented 8/31/2018 with a recent kidney biopsy 8/29 comes to the emergency room complaining of diffuse abdominal pain intensity is 4/10.  No radiation.  She is also finding of a bifrontal headache intensity 5 out of 10.  Throbbing. No radiation.no nausea or vomiting. She was noted to be severely anemic with hb 6.7 . CAT scan of the abdomen shows evidence of retroperitoneal.  Bleed. patient be admitted for further care and management      No history of aspirin or NSAID usage    No history of regulation    No fever chills      Review of Systems  Review of Systems   Constitutional: Positive for malaise/fatigue. Negative for weight loss.   HENT: Negative.    Eyes: Negative.    Respiratory: Negative.    Cardiovascular: Negative.    Gastrointestinal: Positive for abdominal pain.   Genitourinary: Negative.    Musculoskeletal: Negative.    Skin: Negative.    Neurological: Positive for headaches.   Psychiatric/Behavioral: Negative.        Past Medical History   has a past medical history of Bleeding; Bowel habit changes; Dental disorder; Diabetes; Disorder of thyroid; History of anemia; Hyperlipidemia; Hypertension; and Renal disorder.    Surgical History   has a past surgical history that includes breast biopsy; us-needle core bx-breast panel; cholecystectomy; other (2002); other (2005); and gyn surgery (1976, 1981).     Family History  family history includes Diabetes in her maternal grandmother and mother; Hypertension in her brother and sister.     Social History   reports that she has never smoked. She has never used smokeless tobacco. She reports that she does not drink alcohol or use drugs.    Allergies  Allergies   Allergen Reactions   • Metformin Rash     Rash on face and  arms       Medications  Prior to Admission Medications   Prescriptions Last Dose Informant Patient Reported? Taking?   amlodipine (NORVASC) 10 MG Tab 8/30/2018 at Unknown time Patient Yes No   Sig: Take 10 mg by mouth every bedtime.   insulin detemir (LEVEMIR FLEXTOUCH) 100 UNIT/ML Solution Pen-injector injection 8/30/2018 at Unknown time  Yes Yes   Sig: Inject 30 Units as instructed every bedtime.   levothyroxine (SYNTHROID) 112 MCG Tab 8/31/2018 at 0700  Yes Yes   Sig: Take 112 mcg by mouth Every morning on an empty stomach.   pravastatin (PRAVACHOL) 10 MG Tab 8/30/2018 at Unknown time Patient Yes No   Sig: Take 10 mg by mouth every bedtime.   ranitidine (ZANTAC) 150 MG Tab 8/31/2018 at Unknown time Patient Yes No   Sig: Take 150 mg by mouth every day.   vitamin D (CHOLECALCIFEROL) 1000 UNIT Tab 8/31/2018 at Unknown time Patient Yes No   Sig: Take 2,000 Units by mouth every day.      Facility-Administered Medications: None       Physical Exam  Blood Pressure : 153/72   Temperature: 37 °C (98.6 °F)   Pulse: 92   Respiration: 18   Pulse Oximetry: 97 %     Physical Exam   Constitutional: She is oriented to person, place, and time. She appears distressed.   HENT:   Head: Normocephalic and atraumatic.   Mouth/Throat: No oropharyngeal exudate.   Eyes: Pupils are equal, round, and reactive to light. Conjunctivae are normal. Right eye exhibits no discharge. Left eye exhibits no discharge. No scleral icterus.   Neck: No tracheal deviation present. No thyromegaly present.   Cardiovascular: Normal rate.  Exam reveals no gallop and no friction rub.    No murmur heard.  Pulmonary/Chest: Effort normal and breath sounds normal. No respiratory distress. She has no wheezes.   Abdominal: Soft. Bowel sounds are normal. She exhibits no distension. There is no tenderness. There is no rebound.   Musculoskeletal: She exhibits no edema or deformity.   Neurological: She is oriented to person, place, and time. No cranial nerve deficit.  Coordination normal.   Skin: No erythema. There is pallor.       Laboratory:  Recent Labs      08/31/18   1218   WBC  6.3   RBC  2.05*   HEMOGLOBIN  6.7*   HEMATOCRIT  19.4*   MCV  97.6   MCH  32.2   MCHC  33.0*   RDW  46.6   PLATELETCT  172   MPV  9.9     Recent Labs      08/31/18   1218   SODIUM  136   POTASSIUM  4.6   CHLORIDE  111   CO2  18*   GLUCOSE  140*   BUN  21   CREATININE  1.42*   CALCIUM  8.9     Recent Labs      08/31/18   1218   ALTSGPT  8   ASTSGOT  7*   ALKPHOSPHAT  115*   TBILIRUBIN  0.5   LIPASE  44   GLUCOSE  140*                 Lab Results   Component Value Date    TROPONINI <0.01 07/05/2017       Urinalysis:    No results found     Imaging:  CT-CHEST,ABDOMEN,PELVIS WITH   Final Result      1.  Small left perinephric hematoma status post core biopsy.   2.  1.7 x 9.7 cm left anterior pararenal retroperitoneal hematoma.   3.  Small collections of blood within the abdomen and pelvis.   4.  Subtle hypodensities within the left kidney could represent core biopsy sites. No hydronephrosis.   5.  Large left lobe of the liver.   6.  Scattered pancreatic calcifications could be sequela of pancreatitis.   7.  No evidence of bowel obstruction.   Findings were discussed with BETHANY ATKINSON on 8/31/2018 3:02 PM.            Assessment/Plan:  I anticipate this patient will require at least two midnights for appropriate medical management, necessitating inpatient admission.    * Acute blood loss anemia- (present on admission)   Assessment & Plan    Hemoglobin every 6 hours      Transfuse 1 unit of PRBCs right now        Dyslipidemia- (present on admission)   Assessment & Plan    The pravastatin        Retroperitoneal hematoma- (present on admission)   Assessment & Plan    Pain control    Follow hemoglobin    If hematoma continue to drop will need surgical intervention    Avoid blood thinners        Tension type headache- (present on admission)   Assessment & Plan    P.o. Tylenol        Status post biopsy of  kidney- (present on admission)   Assessment & Plan     p.o. Oxycodone prn        CKD (chronic kidney disease), stage III- (present on admission)   Assessment & Plan    Avoid nephrotoxins    Follow BMP        Hypothyroidism- (present on admission)   Assessment & Plan    Continue Synthroid        Essential hypertension- (present on admission)   Assessment & Plan    Continue Norvasc        Type 2 diabetes mellitus with stage 3 chronic kidney disease, with long-term current use of insulin (HCC)- (present on admission)   Assessment & Plan    Levemir    Diabetic diet                VTE prophylaxis: scd

## 2018-09-01 NOTE — PROGRESS NOTES
Assumed care of patient. Pt is A+O x4. No chest pain or SOB. No active bleeding noted. Informed of safety and call system.  is at bedside and patient is resting. Will continue to round on patient hourly and use translation services appropriately.

## 2018-09-01 NOTE — CARE PLAN
Problem: Safety  Goal: Will remain free from injury  Outcome: PROGRESSING AS EXPECTED  Discussed safety protocol using

## 2018-09-01 NOTE — CARE PLAN
Problem: Knowledge Deficit  Goal: Knowledge of disease process/condition, treatment plan, diagnostic tests, and medications will improve  Outcome: PROGRESSING AS EXPECTED  Discussed current plan of care with the patient using

## 2018-09-01 NOTE — PROGRESS NOTES
Spoke with blood bank regarding when patient's pRBC will be ready for infusion and blood bank technician stated that the result was sent out and awaiting result. Will continue to monitor

## 2018-09-01 NOTE — ASSESSMENT & PLAN NOTE
-Continue Lantus 30 units at bedtime.  Continue high scale sliding scale insulin coverage.  Accu-Cheks before meals and at bedtime.  Diabetic diet.

## 2018-09-01 NOTE — CARE PLAN
Problem: Communication  Goal: The ability to communicate needs accurately and effectively will improve    Intervention: Use communication aids and/or /Language Line as appropriate   09/01/18 1320   Special Assistance Needs   Patient's Primary Language Swedish     Will continue to utilize translation services for patient's care and education.      Problem: Safety  Goal: Will remain free from falls    Intervention: Implement fall precautions   09/01/18 1320   OTHER   Environmental Precautions Treaded Slipper Socks on Patient;Personal Belongings, Wastebasket, Call Bell etc. in Easy Reach;Transferred to Stronger Side;Report Given to Other Health Care Providers Regarding Fall Risk;Bed in Low Position;Communication Sign for Patients & Families;Mobility Assessed & Appropriate Sign Placed   IV Pole on Same Side of Bed as Bathroom Yes   Bedrails Bedrails Closest to Bathroom Down   Chair/Bed Strip Alarm Yes - Alarm On   Bed Alarm Yes - Alarm On     Patient will remain free from falls.

## 2018-09-01 NOTE — PROGRESS NOTES
Paged Dr. Rothman regarding Hgb-5.9, still waiting result from blood bank due to positive antibodies. No complaint except weakness from the patient. Told by Dr. Rothman that he will contact Dr. Medrano to assess the patient in person. No further orders given at this time. Will continue to monitor

## 2018-09-01 NOTE — ASSESSMENT & PLAN NOTE
-Appears stable on CT scan.  Hemoglobin has stabilized.  Pain has improved as well  -Continue to monitor H&H every 4 hours.  Hopefully, no further bleeding with continued stability of the hematoma.  If hemoglobin drops again, needs repeat imaging to evaluate for enlarging hematoma, and consider surgical evaluation.  -Hold off on NSAIDs, pharmacologic DVT prophylaxis, antiplatelets.

## 2018-09-02 ENCOUNTER — APPOINTMENT (OUTPATIENT)
Dept: RADIOLOGY | Facility: MEDICAL CENTER | Age: 68
DRG: 920 | End: 2018-09-02
Attending: INTERNAL MEDICINE
Payer: MEDICARE

## 2018-09-02 LAB
BLD GP AB INVEST PLASRBC-IMP: NORMAL
GLUCOSE BLD-MCNC: 114 MG/DL (ref 65–99)
GLUCOSE BLD-MCNC: 137 MG/DL (ref 65–99)
GLUCOSE BLD-MCNC: 148 MG/DL (ref 65–99)
HCT VFR BLD AUTO: 17.6 % (ref 37–47)
HCT VFR BLD AUTO: 21.4 % (ref 37–47)
HCT VFR BLD AUTO: 22 % (ref 37–47)
HCT VFR BLD AUTO: 23.3 % (ref 37–47)
HCT VFR BLD AUTO: 24.5 % (ref 37–47)
HGB BLD-MCNC: 5.8 G/DL (ref 12–16)
HGB BLD-MCNC: 7.4 G/DL (ref 12–16)
HGB BLD-MCNC: 7.4 G/DL (ref 12–16)
HGB BLD-MCNC: 8.3 G/DL (ref 12–16)
HGB BLD-MCNC: 8.5 G/DL (ref 12–16)

## 2018-09-02 PROCEDURE — P9016 RBC LEUKOCYTES REDUCED: HCPCS

## 2018-09-02 PROCEDURE — 86970 RBC PRETX INCUBATJ W/CHEMICL: CPT

## 2018-09-02 PROCEDURE — 86902 BLOOD TYPE ANTIGEN DONOR EA: CPT

## 2018-09-02 PROCEDURE — 700105 HCHG RX REV CODE 258: Performed by: HOSPITALIST

## 2018-09-02 PROCEDURE — 99233 SBSQ HOSP IP/OBS HIGH 50: CPT | Performed by: INTERNAL MEDICINE

## 2018-09-02 PROCEDURE — 36415 COLL VENOUS BLD VENIPUNCTURE: CPT

## 2018-09-02 PROCEDURE — 82962 GLUCOSE BLOOD TEST: CPT

## 2018-09-02 PROCEDURE — 86880 COOMBS TEST DIRECT: CPT

## 2018-09-02 PROCEDURE — 700102 HCHG RX REV CODE 250 W/ 637 OVERRIDE(OP): Performed by: HOSPITALIST

## 2018-09-02 PROCEDURE — 85018 HEMOGLOBIN: CPT | Mod: 91

## 2018-09-02 PROCEDURE — 86870 RBC ANTIBODY IDENTIFICATION: CPT

## 2018-09-02 PROCEDURE — 74176 CT ABD & PELVIS W/O CONTRAST: CPT

## 2018-09-02 PROCEDURE — 86978 RBC PRETREATMENT SERUM: CPT | Mod: 91

## 2018-09-02 PROCEDURE — 85014 HEMATOCRIT: CPT | Mod: 91

## 2018-09-02 PROCEDURE — 86922 COMPATIBILITY TEST ANTIGLOB: CPT | Mod: 91

## 2018-09-02 PROCEDURE — 36430 TRANSFUSION BLD/BLD COMPNT: CPT

## 2018-09-02 PROCEDURE — 86850 RBC ANTIBODY SCREEN: CPT

## 2018-09-02 PROCEDURE — 770020 HCHG ROOM/CARE - TELE (206)

## 2018-09-02 PROCEDURE — 86906 BLD TYPING SEROLOGIC RH PHNT: CPT

## 2018-09-02 PROCEDURE — A9270 NON-COVERED ITEM OR SERVICE: HCPCS | Performed by: HOSPITALIST

## 2018-09-02 PROCEDURE — 86860 RBC ANTIBODY ELUTION: CPT

## 2018-09-02 PROCEDURE — 86901 BLOOD TYPING SEROLOGIC RH(D): CPT

## 2018-09-02 RX ADMIN — OXYCODONE HYDROCHLORIDE 10 MG: 10 TABLET ORAL at 12:11

## 2018-09-02 RX ADMIN — ACETAMINOPHEN 650 MG: 325 TABLET, FILM COATED ORAL at 03:30

## 2018-09-02 RX ADMIN — LEVOTHYROXINE SODIUM 112 MCG: 112 TABLET ORAL at 05:38

## 2018-09-02 RX ADMIN — FAMOTIDINE 20 MG: 20 TABLET ORAL at 05:37

## 2018-09-02 RX ADMIN — SENNOSIDES AND DOCUSATE SODIUM 2 TABLET: 8.6; 5 TABLET ORAL at 17:00

## 2018-09-02 RX ADMIN — SODIUM CHLORIDE: 9 INJECTION, SOLUTION INTRAVENOUS at 06:22

## 2018-09-02 RX ADMIN — INSULIN GLARGINE 30 UNITS: 100 INJECTION, SOLUTION SUBCUTANEOUS at 20:10

## 2018-09-02 RX ADMIN — AMLODIPINE BESYLATE 10 MG: 10 TABLET ORAL at 20:06

## 2018-09-02 RX ADMIN — SODIUM CHLORIDE: 9 INJECTION, SOLUTION INTRAVENOUS at 20:13

## 2018-09-02 RX ADMIN — PRAVASTATIN SODIUM 10 MG: 20 TABLET ORAL at 20:06

## 2018-09-02 RX ADMIN — OXYCODONE HYDROCHLORIDE 10 MG: 10 TABLET ORAL at 07:59

## 2018-09-02 ASSESSMENT — PAIN SCALES - GENERAL
PAINLEVEL_OUTOF10: 0

## 2018-09-02 NOTE — PROGRESS NOTES
Assumed care of patient. Pt is A+O x4. No chest pain or SOB. No active bleeding noted. Informed of safety and call system.  is at bedside. Patient reports being comfortable and is aware of upcoming CT. Will continue to use translation services as appropriate.

## 2018-09-02 NOTE — PROGRESS NOTES
Blood bank called this RN, patient has warm auto antibodies so no compatible blood is available. This RN contacted Dr. Rothman and got clearance for non-compatible blood transfusion and was also told by blood bank that pathologist cleared for non-compatible blood transfusion. Dr. Rothman also stated to administer acetaminophen 650 once prior to blood transfusion and no further orders given. Called blood bank and gave clearance to Shaylee. Awaiting type and cross will inform the patient once blood reaches the floor. Will continue to monitor

## 2018-09-02 NOTE — PROGRESS NOTES
Blood transfusion started 0345, educated patient regarding any side effects that she could have while transfusing. Will continue to monitor.    0400-No changes from baseline after first 15 min. Patient reports no pain or discomfort. Vitals comparable to baseline. Will continue to monitor    0444- 30 min after blood transfusion start, no changes from baseline. No discomfort or pain reported. Will continue to monitor    0545- 1 hr blood transfusion start. No changes from baseline. No discomfort or pain reported. Will continue to monitor    0620- blood transfusion stopped. Pt has no complaints. H/H ordered for recheck. Will continue to monitor

## 2018-09-02 NOTE — PROGRESS NOTES
Patient is resting with family at bedside. Rounded with Dr. Thomas at 0900 with translation services. Will continue to incorporate patient in plan of care.

## 2018-09-02 NOTE — CARE PLAN
Problem: Communication  Goal: The ability to communicate needs accurately and effectively will improve  Outcome: PROGRESSING AS EXPECTED  Use appropriate  services to communicate current plan of care

## 2018-09-02 NOTE — PROGRESS NOTES
Received report from previous shift RN, patient sleeping with family by bedside. No pain or discomfort noted. Awaiting blood unit. Will continue to monitor

## 2018-09-02 NOTE — PROGRESS NOTES
[facility] Hospitalist Progress Note    Date of Service: 2018    Chief Complaint  68 y.o. female with hypothyroidism, hypertension, hyperlipidemia, who underwent a recent kidney biopsy on  for workup for renal insufficiency, admitted 2018 with diffuse abdominal pain.  Hemoglobin was 6.7.  Creatinine was 1.42 which was stable. CT scan of the abdomen showed 1.7 x 9.7 cm left anterior pararenal retroperitoneal hematoma.  Patient ordered for transfusion 1 unit packed RBC.  Hemoglobin followed.  Started on pain medications.    Interval Problem Update  2018 - uneventful night. VSS. Afebrile. Saturating well on 2L O2 NC.  Patient received 1 unit pRBC last night. Repeat Hgb 7.4.  I obtained a stat CT of the abdomen/pelvis, which showed grossly stable left perinephric hematoma, with no significant interval change in the size measuring approximately 9.6 x 2.4 x 12 cm.    > Seen and examined. Doing much better. Denied any pain. Eating well. No nausea, vomiting, Cp, SOB.       Consultants/Specialty  None    Disposition  Monitor on telemetry.        ROS     Pertinent positives/negatives as mentioned above.     A complete review of systems was done. All other systems were negative.      Physical Exam  Laboratory/Imaging   Hemodynamics  Temp (24hrs), Av °C (98.6 °F), Min:36.7 °C (98 °F), Max:38.1 °C (100.5 °F)   Temperature: 36.7 °C (98.1 °F)  Pulse  Av.7  Min: 74  Max: 106    Blood Pressure : 114/59      Respiratory      Respiration: 18, Pulse Oximetry: 95 %, O2 Daily Delivery Respiratory : Silicone Nasal Cannula     Work Of Breathing / Effort: Mild  RUL Breath Sounds: Diminished;Clear, RML Breath Sounds: Diminished, RLL Breath Sounds: Diminished, KIRSTY Breath Sounds: Diminished;Clear, LLL Breath Sounds: Diminished    Fluids    Intake/Output Summary (Last 24 hours) at 18 0720  Last data filed at 18 0620   Gross per 24 hour   Intake              796 ml   Output              900 ml   Net              -104 ml       Nutrition  Orders Placed This Encounter   Procedures   • Diet Order Diabetic     Standing Status:   Standing     Number of Occurrences:   1     Order Specific Question:   Diet:     Answer:   Diabetic [3]     Physical Exam   Constitutional: She is oriented to person, place, and time. She appears well-developed and well-nourished. No distress.   HENT:   Head: Normocephalic and atraumatic.   Mouth/Throat: No oropharyngeal exudate.   Eyes: Pupils are equal, round, and reactive to light. Conjunctivae are normal. No scleral icterus.   Neck: Normal range of motion. Neck supple.   Cardiovascular: Normal rate and regular rhythm.  Exam reveals no gallop and no friction rub.    No murmur heard.  Pulmonary/Chest: Effort normal and breath sounds normal. No respiratory distress. She has no wheezes. She has no rales. She exhibits no tenderness.   Abdominal: Soft. Bowel sounds are normal. She exhibits no distension. There is no tenderness. There is no rebound and no guarding.   Improved/resolved abd tenderness   Musculoskeletal: Normal range of motion. She exhibits no edema or tenderness.   Lymphadenopathy:     She has no cervical adenopathy.   Neurological: She is alert and oriented to person, place, and time. No cranial nerve deficit. Coordination normal.   Skin: Skin is warm and dry. No rash noted. No erythema. No pallor.   Psychiatric: She has a normal mood and affect. Her behavior is normal. Judgment and thought content normal.   Nursing note and vitals reviewed.        Recent Labs      08/31/18   1218   09/01/18   1750  09/01/18   2200  09/02/18   0206   WBC  6.3   --    --    --    --    RBC  2.05*   --    --    --    --    HEMOGLOBIN  6.7*   < >  6.2*  6.1*  5.8*   HEMATOCRIT  19.4*   < >  18.8*  18.5*  17.6*   MCV  97.6   --    --    --    --    MCH  32.2   --    --    --    --    MCHC  33.0*   --    --    --    --    RDW  46.6   --    --    --    --    PLATELETCT  172   --    --    --    --    MPV  9.9   --     --    --    --     < > = values in this interval not displayed.     Recent Labs      08/31/18   1218   SODIUM  136   POTASSIUM  4.6   CHLORIDE  111   CO2  18*   GLUCOSE  140*   BUN  21   CREATININE  1.42*   CALCIUM  8.9                      Assessment/Plan     * Left retroperitoneal hematoma following kidney biopsy- (present on admission)   Assessment & Plan    -Appears stable on CT scan.  Hemoglobin has stabilized.  Pain has improved as well  -Continue to monitor H&H every 4 hours.  Hopefully, no further bleeding with continued stability of the hematoma.  If hemoglobin drops again, needs repeat imaging to evaluate for enlarging hematoma, and consider surgical evaluation.  -Hold off on NSAIDs, pharmacologic DVT prophylaxis, antiplatelets.        Acute blood loss anemia due to retroperitoneal hematoma- (present on admission)   Assessment & Plan    -Hemoglobin has stabilized.  -Continue to monitor H&H every 4 hours, transfuse if drops below 7.        Dyslipidemia- (present on admission)   Assessment & Plan    -Continue pravastatin.        Status post biopsy of kidney- (present on admission)   Assessment & Plan    -Continue pain control as above.        CKD (chronic kidney disease), stage III- (present on admission)   Assessment & Plan    -Creatinine appears stable.  Had recent kidney biopsy, showing diffuse and nodular diabetic glomerulosclerosis.  -Continue to monitor renal function.  Eating well, encourage oral fluid intake.  - avoid nephrotoxins, and continue to renally dose all medications.        Hypothyroidism- (present on admission)   Assessment & Plan    -Continue home dose Synthroid.        Essential hypertension- (present on admission)   Assessment & Plan    -Continue home dose Norvasc.  Continue to monitor blood pressure trend closely.        Type 2 diabetes mellitus with stage 3 chronic kidney disease, with long-term current use of insulin (HCC)- (present on admission)   Assessment & Plan    -Continue  Lantus 30 units at bedtime.  Continue high scale sliding scale insulin coverage.  Accu-Cheks before meals and at bedtime.  Diabetic diet.              Quality-Core Measures   Reviewed items::  Labs reviewed, Medications reviewed and Radiology images reviewed  Glasgow catheter::  No Glasgow  DVT prophylaxis pharmacological::  Contraindicated - Anemia requiring blood transfusion and Contraindicated - High bleeding risk  Ulcer Prophylaxis::  Yes  Assessed for rehabilitation services:  Patient unable to tolerate rehabilitation therapeutic regimen

## 2018-09-02 NOTE — PROGRESS NOTES
Discussed current plan of care with patient and patient's  with a RN who spoke R[place]an as a . Answered all the questions patient had explained what patient could expect from blood transfusion process. Still waiting calls for blood bank. Will continue to monitor

## 2018-09-02 NOTE — CARE PLAN
Problem: Communication  Goal: The ability to communicate needs accurately and effectively will improve    Intervention: Use communication aids and/or /Language Line as appropriate   09/02/18 1510   Special Assistance Needs   Patient's Primary Language Italian     Will continue to use translation services for rounds with doctor and rest of care team.       Problem: Bowel/Gastric:  Goal: Normal bowel function is maintained or improved    Intervention: Educate patient and significant other/support system about diet, fluid intake, medications and activity to promote bowel function  Patient has set goal for bowel movement. Will provide information regarding fluid intake, medications, and provide activity to improve bowel motility.

## 2018-09-03 ENCOUNTER — PATIENT OUTREACH (OUTPATIENT)
Dept: HEALTH INFORMATION MANAGEMENT | Facility: OTHER | Age: 68
End: 2018-09-03

## 2018-09-03 VITALS
BODY MASS INDEX: 24.38 KG/M2 | HEIGHT: 62 IN | RESPIRATION RATE: 18 BRPM | SYSTOLIC BLOOD PRESSURE: 118 MMHG | HEART RATE: 58 BPM | DIASTOLIC BLOOD PRESSURE: 56 MMHG | OXYGEN SATURATION: 94 % | TEMPERATURE: 97.8 F | WEIGHT: 132.5 LBS

## 2018-09-03 PROBLEM — D62 ACUTE BLOOD LOSS ANEMIA: Status: RESOLVED | Noted: 2018-08-31 | Resolved: 2018-09-03

## 2018-09-03 LAB
HCT VFR BLD AUTO: 21.9 % (ref 37–47)
HCT VFR BLD AUTO: 22.6 % (ref 37–47)
HCT VFR BLD AUTO: 27.4 % (ref 37–47)
HGB BLD-MCNC: 7.4 G/DL (ref 12–16)
HGB BLD-MCNC: 7.6 G/DL (ref 12–16)
HGB BLD-MCNC: 9.1 G/DL (ref 12–16)

## 2018-09-03 PROCEDURE — 85014 HEMATOCRIT: CPT

## 2018-09-03 PROCEDURE — 700102 HCHG RX REV CODE 250 W/ 637 OVERRIDE(OP): Performed by: HOSPITALIST

## 2018-09-03 PROCEDURE — 85018 HEMOGLOBIN: CPT | Mod: 91

## 2018-09-03 PROCEDURE — 99239 HOSP IP/OBS DSCHRG MGMT >30: CPT | Performed by: INTERNAL MEDICINE

## 2018-09-03 PROCEDURE — A9270 NON-COVERED ITEM OR SERVICE: HCPCS | Performed by: HOSPITALIST

## 2018-09-03 PROCEDURE — 36415 COLL VENOUS BLD VENIPUNCTURE: CPT

## 2018-09-03 PROCEDURE — 700105 HCHG RX REV CODE 258: Performed by: HOSPITALIST

## 2018-09-03 RX ORDER — POLYETHYLENE GLYCOL 3350 17 G/17G
17 POWDER, FOR SOLUTION ORAL
Qty: 30 EACH | Refills: 0 | Status: SHIPPED | OUTPATIENT
Start: 2018-09-03 | End: 2019-02-24

## 2018-09-03 RX ORDER — OXYCODONE HYDROCHLORIDE 5 MG/1
2.5-5 TABLET ORAL EVERY 4 HOURS PRN
Qty: 15 TAB | Refills: 0 | Status: SHIPPED | OUTPATIENT
Start: 2018-09-03 | End: 2018-09-08

## 2018-09-03 RX ADMIN — SODIUM CHLORIDE: 9 INJECTION, SOLUTION INTRAVENOUS at 09:58

## 2018-09-03 RX ADMIN — OXYCODONE HYDROCHLORIDE 10 MG: 10 TABLET ORAL at 12:27

## 2018-09-03 RX ADMIN — LEVOTHYROXINE SODIUM 112 MCG: 112 TABLET ORAL at 05:13

## 2018-09-03 RX ADMIN — OXYCODONE HYDROCHLORIDE 10 MG: 10 TABLET ORAL at 07:26

## 2018-09-03 RX ADMIN — SENNOSIDES AND DOCUSATE SODIUM 2 TABLET: 8.6; 5 TABLET ORAL at 05:13

## 2018-09-03 RX ADMIN — FAMOTIDINE 20 MG: 20 TABLET ORAL at 05:13

## 2018-09-03 RX ADMIN — OXYCODONE HYDROCHLORIDE 5 MG: 5 TABLET ORAL at 00:21

## 2018-09-03 ASSESSMENT — PAIN SCALES - GENERAL
PAINLEVEL_OUTOF10: 2
PAINLEVEL_OUTOF10: 0
PAINLEVEL_OUTOF10: 7
PAINLEVEL_OUTOF10: 0

## 2018-09-03 NOTE — DISCHARGE INSTRUCTIONS
Discharge Instructions    Discharged to home by car with relative. Discharged via wheelchair, hospital escort: Yes.  Special equipment needed: Not Applicable    Be sure to schedule a follow-up appointment with your primary care doctor or any specialists as instructed.     Discharge Plan:   Diet Plan: Discussed  Activity Level: Discussed  Confirmed Follow up Appointment: Patient to Call and Schedule Appointment  Confirmed Symptoms Management: Discussed  Medication Reconciliation Updated: Yes  Pneumococcal Vaccine Administered/Refused: Not given - Patient refused pneumococcal vaccine  Influenza Vaccine Indication: Patient Refuses    I understand that a diet low in cholesterol, fat, and sodium is recommended for good health. Unless I have been given specific instructions below for another diet, I accept this instruction as my diet prescription.   Other diet: heart healthy    Special Instructions:   Hematoma  (Hematoma)  Un hematoma es mara acumulación de rajan. La bolsa de rajan se puede convertir en mara masa dura y dolorosa bajo la piel. La piel puede estar jennifer o amarilla si el hematoma se encuentra cerca de la superficie de la piel. La mayoría de los hematomas mejoran en unos pocos días o semanas. Algunos hematomas son graves y requieren atención médica. Los hematomas pueden ser muy pequeños o muy grandes.  CUIDADOS EN EL HOGAR  · Aplique hielo sobre la terri lesionada.  ¨ Ponga el hielo en mara bolsa plástica.  ¨ Colóquese mara toalla entre la piel y la bolsa de hielo.  ¨ Deje el hielo deb 20 minutos 2 a 3 veces por día, deb los 1 o 2 primeros días.  · Después de los primeros 2 días, aplique compresas calientes en la terri afectada.  · Levante (eleve la terri lesionada para disminuir el dolor y la inflamación (hinchazón). También puede envolver el área con un vendaje elástico. Asegúrese de que el vendaje no se ajusta demasiado.  · Si tiene un hematoma doloroso en la pierna o el pie, puede utilizar muletas  deb un par de días.  · Cooper sólo los medicamentos según le haya indicado el médico.  SOLICITE AYUDA DE INMEDIATO SI:  · El dolor empeora.  · El dolor no se tello con los medicamentos.  · Tiene fiebre.  · Cardona hinchazón empeora.  · Cardona piel se vuelve más jennifer o amarilla.  · La piel sobre el hematoma se rompe o comienza a sangrar.  · El hematoma se encuentra en el tórax o el vientre (abdomen) y siente debilidad, le falta el aire o cambia cardona estado de conciencia.  · El hematoma se encuentra en el cuero cabelludo y tiene un dolor de tita que empeora o hay un cambio en el estado de alerta o de conciencia.  ASEGÚRESE DE QUE:  · Comprende estas instrucciones.  · Controlará cardona afección.  · Recibirá ayuda de inmediato si no mejora o si empeora.  Esta información no tiene ashely fin reemplazar el consejo del médico. Asegúrese de hacerle al médico cualquier pregunta que tenga.  Document Released: 12/18/2006 Document Revised: 01/08/2016 Document Reviewed: 05/28/2014  [employer] Interactive Patient Education © 2017 [employer]      Hemorragia retroperitoneal  (Retroperitoneal Bleeding)  La hemorragia retroperitoneal se produce cuando los vasos sanguíneos, que están detrás del abdomen, sangran en el espacio que se encuentra entre el abdomen y la espalda (espacio retroperitoneal). En fran espacio se encuentra lo siguiente:  · Los riñones.  · Las glándulas que se encuentran arriba de los riñones (glándulas suprarrenales).  · Los conductos que drenan orina de los riñones (uréteres).  · El vaso sanguíneo yelena que transporta rajan a la parte inferior del cuerpo (aorta).  · Algunas partes del tubo digestivo.  La hemorragia puede provenir de cualquiera de los órganos que se encuentran en el espacio retroperitoneal. Esta es mara enfermedad poco frecuente candi potencialmente mortal.  CAUSAS  Fran trastorno puede ser causado por:  · Cirugía en el espacio retroperitoneal.  · Lesión (traumatismo) en la terri pélvica, el abdomen o la  espalda.  Cuando la hemorragia retroperitoneal no es causada por un traumatismo, se denomina hemorragia retroperitoneal espontánea. La hemorragia retroperitoneal espontánea puede ser causada por lo siguiente:  · Tumores en los riñones o las glándulas suprarrenales.  · Uso de medicamentos anticoagulantes.  · Enfermedades que hacen que el cuerpo sea incapaz de formar coágulos sanguíneos (trastornos de coagulación).  · Hinchazón de un vaso sanguíneo debido a mara pared arterial debilitada (aneurisma), que puede romperse y causar mara hemorragia.  Algunas veces la causa no se conoce (idiopática).  FACTORES DE RIESGO  Es más probable que esta afección se manifieste en las personas que:  · Se someten a diálisis.  · Adam anticoagulantes.  · Tienen hipertensión arterial.  · Presentan endurecimiento de las arterias (ateroesclerosis).  SÍNTOMAS  Los signos y síntomas pueden aparecer de manera repentina si la hemorragia comienza de pronto (aguda), o hawk gradualmente si la hemorragia se produce lentamente con el tiempo (crónica). Los síntomas de esta afección incluyen lo siguiente:  · Dolor en el abdomen o en un costado (fosa lumbar).  · Dolor al ejercer presión sobre el abdomen o la fosa lumbar.  · Mareos o sensación de desvanecimiento debido a la presión arterial baja.  · Frecuencia cardíaca rápida (taquicardia).  · Rajan en la orina.  · Presión arterial muy baja, que puede causar un choque. Los síntomas del choque incluyen los siguientes:  ¨ Desmayos.  ¨ Problemas respiratorios.  ¨ Piel fría y húmeda.  DIAGNÓSTICO  Esta afección puede diagnosticarse en función de los síntomas y la historia clínica. Es importante determinar la causa de la hemorragia. El médico le hará un examen físico. También pueden hacerle exámenes que incluyen:  · Radiografías abdominales para controlar los signos de la hemorragia o el aneurisma.  · Análisis de rajan para controlar los recuentos bajos de glóbulos rojos (anemia) o la pérdida de  rajan.  · Estudios de diagnóstico por imágenes, por ejemplo:  ¨ Tomografía computarizada. Pueden inyectarle mara sustancia de contraste en un vaso sanguíneo para ayudar a localizar el origen de la hemorragia (angiografía por tomografía computarizada).  ¨ Ecografía.  ¨ Resonancia magnética.  TRATAMIENTO  El objetivo del tratamiento es extraer la rajan y detener la hemorragia. El tratamiento puede incluir lo siguiente:  · Administración de líquidos a través de mara vía intravenosa para que la presión arterial regrese a reshma valores normales.  · Transfusión de rajan de un donante.  · Colocación de un tubo beatrice (catéter) a través del vaso sanguíneo en el lugar de la hemorragia y bloqueo de la hemorragia con un pequeño tapón (embolización).  · Cirugía exploratoria para detectar la hemorragia y detenerla. [place] puede realizarse con un microscopio quirúrgico (laparoscopia) o ashely mara cirugía abierta (laparotomía).  Esta información no tiene ashely fin reemplazar el consejo del médico. Asegúrese de hacerle al médico cualquier pregunta que tenga.  Document Released: 04/05/2010 Document Revised: 01/08/2016 Document Reviewed: 09/23/2015  [employer] Interactive Patient Education © 2017 [employer]    · Is patient discharged on Warfarin / Coumadin?   No     Depression / Suicide Risk    As you are discharged from this [facility] facility, it is important to learn how to keep safe from harming yourself.    Recognize the warning signs:  · Abrupt changes in personality, positive or negative- including increase in energy   · Giving away possessions  · Change in eating patterns- significant weight changes-  positive or negative  · Change in sleeping patterns- unable to sleep or sleeping all the time   · Unwillingness or inability to communicate  · Depression  · Unusual sadness, discouragement and loneliness  · Talk of wanting to die  · Neglect of personal appearance   · Rebelliousness- reckless behavior  · Withdrawal from  people/activities they love  · Confusion- inability to concentrate     If you or a loved one observes any of these behaviors or has concerns about self-harm, here's what you can do:  · Talk about it- your feelings and reasons for harming yourself  · Remove any means that you might use to hurt yourself (examples: pills, rope, extension cords, firearm)  · Get professional help from the community (Mental Health, Substance Abuse, psychological counseling)  · Do not be alone:Call your Safe Contact- someone whom you trust who will be there for you.  · Call your local CRISIS HOTLINE 413-6412 or 653-457-1918  · Call your local Children's Mobile Crisis Response Team [place] (424) 015-9640 or www.[employer]  · Call the toll free National Suicide Prevention Hotlines   · National Suicide Prevention Lifeline 530-983-IFCR (4935)  · National Hope Line Network 800-SUICIDE (762-8518)

## 2018-09-03 NOTE — PROGRESS NOTES
Pt discharged via wheelchair with  and daughter to car to home. Pt was AOx4 at time of discharge. PIVs removed, tele box taken off. RN with Palestinian Speaking RN Yasmany went over discharge instructions thoroughly including medications, healthy diet, activity, signs and symptoms of infection, heart attack and stroke. RN reviewed when to call MD and when to call 911. Paperwork signed and all questions answered. Follow up appointment was already set for 9/11 at [facility] and pt verbally understands that following through with the appointment is necessary. All personal belongings with patient. Signed prescription of oxycodone given to patient and the others were escribed to patient's home pharmacy, and RN confirmed her pharmacy. Copy of AVS discharge instructions given to patient. All questions answered to daughter and patient with help of Yasmany TALBERT translating

## 2018-09-03 NOTE — CARE PLAN
Problem: Medication  Goal: Compliance with prescribed medication will improve  Outcome: PROGRESSING AS EXPECTED  Discussed medications ordered for the patient with  services

## 2018-09-03 NOTE — DISCHARGE SUMMARY
Discharge Summary    CHIEF COMPLAINT ON ADMISSION  Chief Complaint   Patient presents with   • Post-Op Complications     LT sided kidney biopsy on Wednesday, following procedure developed generalized abd pain and upper back between shoulder blades/mid-back pain.   • Abdominal Pain   • Back Pain   • Chest Pain     reports LT sided intermittent chest pain today.       Reason for Admission  Chest Pain; Back Pain     Admission Date  8/31/2018    CODE STATUS  Full Code    HPI & HOSPITAL COURSE  This is a 68 y.o. female with hypothyroidism, hypertension, hyperlipidemia, who underwent a recent kidney biopsy on 8/29 for workup for renal insufficiency, admitted 8/31/2018 with diffuse abdominal pain.  Hemoglobin was 6.7.  Creatinine was 1.42 which was stable. CT scan of the abdomen showed 1.7 x 9.7 cm left anterior pararenal retroperitoneal hematoma.  Patient was transfused 1 unit packed RBC. She was started on pain medications. Her hemoglobin was followed, and it has stabilized, and started to increase.   Repeat CT of the abdomen/pelvis, showed grossly stable left perinephric hematoma, with no significant interval change in the size measuring approximately 9.6 x 2.4 x 12 cm.    She remained hemodynamically stable and afebrile.  Her pain has resolved, and she was able to ambulate without any issues.  With her clinical improvement, she was deemed ready to be discharged from the hospital as she did not have any further inpatient needs.  Patient felt comfortable going home.  The discharge plan was discussed with the patient in her , with which they were agreeable to it.      Therefore, she is discharged in good and stable condition to home with close outpatient follow-up.    The patient met 2-midnight criteria for an inpatient stay at the time of discharge.    Discharge Date  9/3/2018      FOLLOW UP ITEMS POST DISCHARGE  -She will follow-up with her primary care physician, and nephrologist.  -I will give her a limited  supply of oral oxycodone as needed for breakthrough pain.    DISCHARGE DIAGNOSES  Principal Problem:    Left retroperitoneal hematoma following kidney biopsy POA: Yes  Active Problems:    Type 2 diabetes mellitus with stage 3 chronic kidney disease, with long-term current use of insulin (HCC) POA: Yes    Essential hypertension POA: Yes    Hypothyroidism POA: Yes    CKD (chronic kidney disease), stage III POA: Yes    Status post biopsy of kidney POA: Yes    Dyslipidemia POA: Yes  Resolved Problems:    Acute blood loss anemia due to retroperitoneal hematoma POA: Yes      FOLLOW UP  Future Appointments  Date Time Provider Department Center   10/6/2018 9:30 AM [facility]  [facility]     No follow-up provider specified.    MEDICATIONS ON DISCHARGE     Medication List      START taking these medications      Instructions   oxyCODONE immediate-release 5 MG Tabs  Commonly known as:  ROXICODONE   Take 0.5-1 Tabs by mouth every four hours as needed for Severe Pain for up to 5 days.  Dose:  2.5-5 mg     polyethylene glycol/lytes Pack  Commonly known as:  MIRALAX   Take 1 Packet by mouth 1 time daily as needed (if sennosides and docusate ineffective after 24 hours).  Dose:  17 g        CONTINUE taking these medications      Instructions   amLODIPine 10 MG Tabs  Commonly known as:  NORVASC   Take 10 mg by mouth every bedtime.  Dose:  10 mg     LEVEMIR FLEXTOUCH 100 UNIT/ML Sopn injection  Generic drug:  insulin detemir   Inject 30 Units as instructed every bedtime.  Dose:  30 Units     levothyroxine 112 MCG Tabs  Commonly known as:  SYNTHROID   Take 112 mcg by mouth Every morning on an empty stomach.  Dose:  112 mcg     pravastatin 10 MG Tabs  Commonly known as:  PRAVACHOL   Take 10 mg by mouth every bedtime.  Dose:  10 mg     raNITidine 150 MG Tabs  Commonly known as:  ZANTAC   Take 150 mg by mouth every day.  Dose:  150 mg     vitamin D 1000 UNIT Tabs  Commonly known as:  cholecalciferol   Take 2,000 Units by mouth every  day.  Dose:  2000 Units            Allergies  Allergies   Allergen Reactions   • Metformin Rash     Rash on face and arms       DIET  Orders Placed This Encounter   Procedures   • Diet Order Diabetic     Standing Status:   Standing     Number of Occurrences:   1     Order Specific Question:   Diet:     Answer:   Diabetic [3]       ACTIVITY  As tolerated.  Weight bearing as tolerated    CONSULTATIONS  None    PROCEDURES  none    LABORATORY  Lab Results   Component Value Date    SODIUM 136 08/31/2018    POTASSIUM 4.6 08/31/2018    CHLORIDE 111 08/31/2018    CO2 18 (L) 08/31/2018    GLUCOSE 140 (H) 08/31/2018    BUN 21 08/31/2018    CREATININE 1.42 (H) 08/31/2018        Lab Results   Component Value Date    WBC 6.3 08/31/2018    HEMOGLOBIN 9.1 (L) 09/03/2018    HEMATOCRIT 27.4 (L) 09/03/2018    PLATELETCT 172 08/31/2018        Total time of the discharge process = 35 minutes.

## 2018-09-03 NOTE — PROGRESS NOTES
Received report from previous shift RN, patient AOX4 with family by bedside. No pain or discomfort noted. Will continue to monitor

## 2018-09-03 NOTE — CARE PLAN
Problem: Safety  Goal: Will remain free from injury  Outcome: PROGRESSING AS EXPECTED  Discussed safety protocol with patient using  services

## 2018-09-03 NOTE — PROGRESS NOTES
Bedside report completed with GALI Jade. Assumed pt care. Pt resting, denies pain, sitting up in bed for breakfast,  at bedside. Bed locked in low position, call light within reach. Bed alarm on.

## 2018-09-03 NOTE — PROGRESS NOTES
Received report from GALI Saravia. Assumed care of patient at this time. Patient resting in bed with family at bedside. Tele monitor in place. Bed low and locked with call bell and personal items within reach. Will continue to monitor.

## 2018-09-04 LAB
BARCODED ABORH UBTYP: 7300
BARCODED PRD CODE UBPRD: NORMAL
BARCODED UNIT NUM UBUNT: NORMAL
COMPONENT R 8504R: NORMAL
PRODUCT TYPE UPROD: NORMAL
UNIT STATUS USTAT: NORMAL

## 2018-10-06 ENCOUNTER — HOSPITAL ENCOUNTER (OUTPATIENT)
Dept: LAB | Facility: MEDICAL CENTER | Age: 68
End: 2018-10-06
Attending: NURSE PRACTITIONER
Payer: MEDICARE

## 2018-10-06 ENCOUNTER — HOSPITAL ENCOUNTER (OUTPATIENT)
Dept: RADIOLOGY | Facility: MEDICAL CENTER | Age: 68
End: 2018-10-06
Attending: INTERNAL MEDICINE
Payer: MEDICARE

## 2018-10-06 DIAGNOSIS — R10.13 ABDOMINAL PAIN, EPIGASTRIC: ICD-10-CM

## 2018-10-06 DIAGNOSIS — K59.00 CONSTIPATION, UNSPECIFIED CONSTIPATION TYPE: ICD-10-CM

## 2018-10-06 DIAGNOSIS — R13.19 OTHER DYSPHAGIA: ICD-10-CM

## 2018-10-06 DIAGNOSIS — K75.4 AUTOIMMUNE HEPATITIS (HCC): ICD-10-CM

## 2018-10-06 DIAGNOSIS — R74.8 ABNORMAL SERUM LEVEL OF LIPASE: ICD-10-CM

## 2018-10-06 LAB
ALBUMIN SERPL BCP-MCNC: 3.8 G/DL (ref 3.2–4.9)
ALBUMIN/GLOB SERPL: 0.7 G/DL
ALP SERPL-CCNC: 158 U/L (ref 30–99)
ALT SERPL-CCNC: 10 U/L (ref 2–50)
ANION GAP SERPL CALC-SCNC: 7 MMOL/L (ref 0–11.9)
AST SERPL-CCNC: 14 U/L (ref 12–45)
BASOPHILS # BLD AUTO: 0.6 % (ref 0–1.8)
BASOPHILS # BLD: 0.03 K/UL (ref 0–0.12)
BILIRUB SERPL-MCNC: 0.4 MG/DL (ref 0.1–1.5)
BUN SERPL-MCNC: 31 MG/DL (ref 8–22)
CALCIUM SERPL-MCNC: 9.8 MG/DL (ref 8.5–10.5)
CHLORIDE SERPL-SCNC: 113 MMOL/L (ref 96–112)
CHOLEST SERPL-MCNC: 140 MG/DL (ref 100–199)
CO2 SERPL-SCNC: 20 MMOL/L (ref 20–33)
CREAT SERPL-MCNC: 1.62 MG/DL (ref 0.5–1.4)
EOSINOPHIL # BLD AUTO: 0.06 K/UL (ref 0–0.51)
EOSINOPHIL NFR BLD: 1.2 % (ref 0–6.9)
ERYTHROCYTE [DISTWIDTH] IN BLOOD BY AUTOMATED COUNT: 56.1 FL (ref 35.9–50)
GLOBULIN SER CALC-MCNC: 5.1 G/DL (ref 1.9–3.5)
GLUCOSE SERPL-MCNC: 154 MG/DL (ref 65–99)
HCT VFR BLD AUTO: 32.6 % (ref 37–47)
HDLC SERPL-MCNC: 32 MG/DL
HGB BLD-MCNC: 10.6 G/DL (ref 12–16)
IMM GRANULOCYTES # BLD AUTO: 0.01 K/UL (ref 0–0.11)
IMM GRANULOCYTES NFR BLD AUTO: 0.2 % (ref 0–0.9)
LDLC SERPL CALC-MCNC: 63 MG/DL
LYMPHOCYTES # BLD AUTO: 1.26 K/UL (ref 1–4.8)
LYMPHOCYTES NFR BLD: 24.7 % (ref 22–41)
MCH RBC QN AUTO: 31.6 PG (ref 27–33)
MCHC RBC AUTO-ENTMCNC: 32.5 G/DL (ref 33.6–35)
MCV RBC AUTO: 97.3 FL (ref 81.4–97.8)
MONOCYTES # BLD AUTO: 0.26 K/UL (ref 0–0.85)
MONOCYTES NFR BLD AUTO: 5.1 % (ref 0–13.4)
NEUTROPHILS # BLD AUTO: 3.48 K/UL (ref 2–7.15)
NEUTROPHILS NFR BLD: 68.2 % (ref 44–72)
NRBC # BLD AUTO: 0 K/UL
NRBC BLD-RTO: 0 /100 WBC
PLATELET # BLD AUTO: 173 K/UL (ref 164–446)
PMV BLD AUTO: 10.5 FL (ref 9–12.9)
POTASSIUM SERPL-SCNC: 4.4 MMOL/L (ref 3.6–5.5)
PROT SERPL-MCNC: 8.9 G/DL (ref 6–8.2)
RBC # BLD AUTO: 3.35 M/UL (ref 4.2–5.4)
SODIUM SERPL-SCNC: 140 MMOL/L (ref 135–145)
T4 FREE SERPL-MCNC: 1.44 NG/DL (ref 0.53–1.43)
TRIGL SERPL-MCNC: 223 MG/DL (ref 0–149)
TSH SERPL DL<=0.005 MIU/L-ACNC: 0.34 UIU/ML (ref 0.38–5.33)
WBC # BLD AUTO: 5.1 K/UL (ref 4.8–10.8)

## 2018-10-06 PROCEDURE — 80061 LIPID PANEL: CPT

## 2018-10-06 PROCEDURE — 84443 ASSAY THYROID STIM HORMONE: CPT

## 2018-10-06 PROCEDURE — 85025 COMPLETE CBC W/AUTO DIFF WBC: CPT

## 2018-10-06 PROCEDURE — 36415 COLL VENOUS BLD VENIPUNCTURE: CPT

## 2018-10-06 PROCEDURE — 84439 ASSAY OF FREE THYROXINE: CPT

## 2018-10-06 PROCEDURE — 80053 COMPREHEN METABOLIC PANEL: CPT

## 2018-10-06 PROCEDURE — 76705 ECHO EXAM OF ABDOMEN: CPT

## 2018-10-09 ENCOUNTER — HOSPITAL ENCOUNTER (OUTPATIENT)
Dept: LAB | Facility: MEDICAL CENTER | Age: 68
End: 2018-10-09
Attending: INTERNAL MEDICINE
Payer: MEDICARE

## 2018-10-09 LAB
ALBUMIN SERPL BCP-MCNC: 3.7 G/DL (ref 3.2–4.9)
ALBUMIN/GLOB SERPL: 0.9 G/DL
ALP SERPL-CCNC: 141 U/L (ref 30–99)
ALT SERPL-CCNC: 9 U/L (ref 2–50)
AMYLASE SERPL-CCNC: 101 U/L (ref 20–103)
ANION GAP SERPL CALC-SCNC: 9 MMOL/L (ref 0–11.9)
AST SERPL-CCNC: 13 U/L (ref 12–45)
BASOPHILS # BLD AUTO: 0.4 % (ref 0–1.8)
BASOPHILS # BLD: 0.02 K/UL (ref 0–0.12)
BILIRUB SERPL-MCNC: 0.3 MG/DL (ref 0.1–1.5)
BUN SERPL-MCNC: 33 MG/DL (ref 8–22)
CALCIUM SERPL-MCNC: 9.4 MG/DL (ref 8.5–10.5)
CHLORIDE SERPL-SCNC: 111 MMOL/L (ref 96–112)
CO2 SERPL-SCNC: 19 MMOL/L (ref 20–33)
CREAT SERPL-MCNC: 1.42 MG/DL (ref 0.5–1.4)
EOSINOPHIL # BLD AUTO: 0.06 K/UL (ref 0–0.51)
EOSINOPHIL NFR BLD: 1.1 % (ref 0–6.9)
ERYTHROCYTE [DISTWIDTH] IN BLOOD BY AUTOMATED COUNT: 55 FL (ref 35.9–50)
GLOBULIN SER CALC-MCNC: 4 G/DL (ref 1.9–3.5)
GLUCOSE SERPL-MCNC: 209 MG/DL (ref 65–99)
HCT VFR BLD AUTO: 31.2 % (ref 37–47)
HGB BLD-MCNC: 10.2 G/DL (ref 12–16)
IMM GRANULOCYTES # BLD AUTO: 0.01 K/UL (ref 0–0.11)
IMM GRANULOCYTES NFR BLD AUTO: 0.2 % (ref 0–0.9)
INR PPP: 1.04 (ref 0.87–1.13)
LIPASE SERPL-CCNC: 78 U/L (ref 11–82)
LYMPHOCYTES # BLD AUTO: 1.65 K/UL (ref 1–4.8)
LYMPHOCYTES NFR BLD: 30.7 % (ref 22–41)
MCH RBC QN AUTO: 31.8 PG (ref 27–33)
MCHC RBC AUTO-ENTMCNC: 32.7 G/DL (ref 33.6–35)
MCV RBC AUTO: 97.2 FL (ref 81.4–97.8)
MONOCYTES # BLD AUTO: 0.32 K/UL (ref 0–0.85)
MONOCYTES NFR BLD AUTO: 6 % (ref 0–13.4)
NEUTROPHILS # BLD AUTO: 3.31 K/UL (ref 2–7.15)
NEUTROPHILS NFR BLD: 61.6 % (ref 44–72)
NRBC # BLD AUTO: 0 K/UL
NRBC BLD-RTO: 0 /100 WBC
PLATELET # BLD AUTO: 159 K/UL (ref 164–446)
PMV BLD AUTO: 10.6 FL (ref 9–12.9)
POTASSIUM SERPL-SCNC: 4.2 MMOL/L (ref 3.6–5.5)
PROT SERPL-MCNC: 7.7 G/DL (ref 6–8.2)
PROTHROMBIN TIME: 13.7 SEC (ref 12–14.6)
RBC # BLD AUTO: 3.21 M/UL (ref 4.2–5.4)
SODIUM SERPL-SCNC: 139 MMOL/L (ref 135–145)
WBC # BLD AUTO: 5.4 K/UL (ref 4.8–10.8)

## 2018-10-09 PROCEDURE — 85025 COMPLETE CBC W/AUTO DIFF WBC: CPT

## 2018-10-09 PROCEDURE — 82784 ASSAY IGA/IGD/IGG/IGM EACH: CPT

## 2018-10-09 PROCEDURE — 82107 ALPHA-FETOPROTEIN L3: CPT

## 2018-10-09 PROCEDURE — 80053 COMPREHEN METABOLIC PANEL: CPT

## 2018-10-09 PROCEDURE — 83690 ASSAY OF LIPASE: CPT

## 2018-10-09 PROCEDURE — 82150 ASSAY OF AMYLASE: CPT

## 2018-10-09 PROCEDURE — 85610 PROTHROMBIN TIME: CPT

## 2018-10-09 PROCEDURE — 36415 COLL VENOUS BLD VENIPUNCTURE: CPT

## 2018-10-11 LAB — IGG SERPL-MCNC: 1860 MG/DL (ref 768–1632)

## 2018-10-12 LAB
AFP L3 MFR SERPL: <0.5 % (ref 0–9.9)
AFP SERPL-MCNC: 2 NG/ML (ref 0–15)

## 2018-10-29 ENCOUNTER — HOSPITAL ENCOUNTER (OUTPATIENT)
Dept: LAB | Facility: MEDICAL CENTER | Age: 68
End: 2018-10-29
Attending: INTERNAL MEDICINE
Payer: MEDICARE

## 2018-10-29 LAB
25(OH)D3 SERPL-MCNC: 48 NG/ML (ref 30–100)
ALBUMIN SERPL BCP-MCNC: 3.8 G/DL (ref 3.2–4.9)
ALBUMIN/GLOB SERPL: 0.8 G/DL
ALP SERPL-CCNC: 155 U/L (ref 30–99)
ALT SERPL-CCNC: 8 U/L (ref 2–50)
ANION GAP SERPL CALC-SCNC: 7 MMOL/L (ref 0–11.9)
APPEARANCE UR: CLEAR
AST SERPL-CCNC: 13 U/L (ref 12–45)
BACTERIA #/AREA URNS HPF: ABNORMAL /HPF
BASOPHILS # BLD AUTO: 0.4 % (ref 0–1.8)
BASOPHILS # BLD: 0.02 K/UL (ref 0–0.12)
BILIRUB SERPL-MCNC: 0.4 MG/DL (ref 0.1–1.5)
BILIRUB UR QL STRIP.AUTO: NEGATIVE
BUN SERPL-MCNC: 32 MG/DL (ref 8–22)
CA-I SERPL-SCNC: 1.2 MMOL/L (ref 1.1–1.3)
CALCIUM SERPL-MCNC: 9.9 MG/DL (ref 8.5–10.5)
CHLORIDE SERPL-SCNC: 109 MMOL/L (ref 96–112)
CO2 SERPL-SCNC: 21 MMOL/L (ref 20–33)
COLOR UR: YELLOW
CREAT SERPL-MCNC: 1.88 MG/DL (ref 0.5–1.4)
CREAT UR-MCNC: 260.4 MG/DL
EOSINOPHIL # BLD AUTO: 0.04 K/UL (ref 0–0.51)
EOSINOPHIL NFR BLD: 0.7 % (ref 0–6.9)
EPI CELLS #/AREA URNS HPF: ABNORMAL /HPF
ERYTHROCYTE [DISTWIDTH] IN BLOOD BY AUTOMATED COUNT: 45.9 FL (ref 35.9–50)
FERRITIN SERPL-MCNC: 335.6 NG/ML (ref 10–291)
GLOBULIN SER CALC-MCNC: 4.6 G/DL (ref 1.9–3.5)
GLUCOSE SERPL-MCNC: 170 MG/DL (ref 65–99)
GLUCOSE UR STRIP.AUTO-MCNC: 250 MG/DL
HCT VFR BLD AUTO: 31.6 % (ref 37–47)
HGB BLD-MCNC: 10.8 G/DL (ref 12–16)
HYALINE CASTS #/AREA URNS LPF: ABNORMAL /LPF
IMM GRANULOCYTES # BLD AUTO: 0.02 K/UL (ref 0–0.11)
IMM GRANULOCYTES NFR BLD AUTO: 0.4 % (ref 0–0.9)
IRON SATN MFR SERPL: 44 % (ref 15–55)
IRON SERPL-MCNC: 127 UG/DL (ref 40–170)
KETONES UR STRIP.AUTO-MCNC: ABNORMAL MG/DL
LEUKOCYTE ESTERASE UR QL STRIP.AUTO: NEGATIVE
LYMPHOCYTES # BLD AUTO: 1.75 K/UL (ref 1–4.8)
LYMPHOCYTES NFR BLD: 32.6 % (ref 22–41)
MAGNESIUM SERPL-MCNC: 2.2 MG/DL (ref 1.5–2.5)
MCH RBC QN AUTO: 32.3 PG (ref 27–33)
MCHC RBC AUTO-ENTMCNC: 34.2 G/DL (ref 33.6–35)
MCV RBC AUTO: 94.6 FL (ref 81.4–97.8)
MICRO URNS: ABNORMAL
MONOCYTES # BLD AUTO: 0.29 K/UL (ref 0–0.85)
MONOCYTES NFR BLD AUTO: 5.4 % (ref 0–13.4)
NEUTROPHILS # BLD AUTO: 3.24 K/UL (ref 2–7.15)
NEUTROPHILS NFR BLD: 60.5 % (ref 44–72)
NITRITE UR QL STRIP.AUTO: NEGATIVE
NRBC # BLD AUTO: 0 K/UL
NRBC BLD-RTO: 0 /100 WBC
PH UR STRIP.AUTO: 6 [PH]
PLATELET # BLD AUTO: 181 K/UL (ref 164–446)
PMV BLD AUTO: 10.5 FL (ref 9–12.9)
POTASSIUM SERPL-SCNC: 4.4 MMOL/L (ref 3.6–5.5)
PROT SERPL-MCNC: 8.4 G/DL (ref 6–8.2)
PROT UR QL STRIP: >=300 MG/DL
PROT UR-MCNC: 2359.2 MG/DL (ref 0–15)
PROT/CREAT UR: 9060 MG/G (ref 10–107)
PTH-INTACT SERPL-MCNC: 144.1 PG/ML (ref 14–72)
RBC # BLD AUTO: 3.34 M/UL (ref 4.2–5.4)
RBC # URNS HPF: ABNORMAL /HPF
RBC UR QL AUTO: ABNORMAL
RENAL EPI CELLS #/AREA URNS HPF: ABNORMAL /HPF
SODIUM SERPL-SCNC: 137 MMOL/L (ref 135–145)
SP GR UR STRIP.AUTO: >=1.03
TIBC SERPL-MCNC: 291 UG/DL (ref 250–450)
URATE SERPL-MCNC: 9.8 MG/DL (ref 1.9–8.2)
UROBILINOGEN UR STRIP.AUTO-MCNC: 0.2 MG/DL
WBC # BLD AUTO: 5.4 K/UL (ref 4.8–10.8)
WBC #/AREA URNS HPF: ABNORMAL /HPF

## 2018-10-29 PROCEDURE — 80053 COMPREHEN METABOLIC PANEL: CPT

## 2018-10-29 PROCEDURE — 82330 ASSAY OF CALCIUM: CPT

## 2018-10-29 PROCEDURE — 82728 ASSAY OF FERRITIN: CPT

## 2018-10-29 PROCEDURE — 81001 URINALYSIS AUTO W/SCOPE: CPT

## 2018-10-29 PROCEDURE — 82570 ASSAY OF URINE CREATININE: CPT

## 2018-10-29 PROCEDURE — 83970 ASSAY OF PARATHORMONE: CPT

## 2018-10-29 PROCEDURE — 83550 IRON BINDING TEST: CPT

## 2018-10-29 PROCEDURE — 82306 VITAMIN D 25 HYDROXY: CPT

## 2018-10-29 PROCEDURE — 83540 ASSAY OF IRON: CPT

## 2018-10-29 PROCEDURE — 36415 COLL VENOUS BLD VENIPUNCTURE: CPT

## 2018-10-29 PROCEDURE — 84550 ASSAY OF BLOOD/URIC ACID: CPT

## 2018-10-29 PROCEDURE — 84156 ASSAY OF PROTEIN URINE: CPT

## 2018-10-29 PROCEDURE — 83735 ASSAY OF MAGNESIUM: CPT

## 2018-10-29 PROCEDURE — 85025 COMPLETE CBC W/AUTO DIFF WBC: CPT

## 2018-10-30 ENCOUNTER — HOSPITAL ENCOUNTER (EMERGENCY)
Facility: MEDICAL CENTER | Age: 68
End: 2018-10-31
Attending: EMERGENCY MEDICINE
Payer: MEDICARE

## 2018-10-30 DIAGNOSIS — R10.11 RIGHT UPPER QUADRANT ABDOMINAL PAIN: ICD-10-CM

## 2018-10-30 PROCEDURE — 99285 EMERGENCY DEPT VISIT HI MDM: CPT

## 2018-10-31 ENCOUNTER — APPOINTMENT (OUTPATIENT)
Dept: RADIOLOGY | Facility: MEDICAL CENTER | Age: 68
End: 2018-10-31
Attending: EMERGENCY MEDICINE
Payer: MEDICARE

## 2018-10-31 VITALS
HEIGHT: 62 IN | BODY MASS INDEX: 25.15 KG/M2 | SYSTOLIC BLOOD PRESSURE: 235 MMHG | DIASTOLIC BLOOD PRESSURE: 118 MMHG | HEART RATE: 79 BPM | RESPIRATION RATE: 42 BRPM | TEMPERATURE: 97.9 F | WEIGHT: 136.69 LBS | OXYGEN SATURATION: 99 %

## 2018-10-31 LAB
ALBUMIN SERPL BCP-MCNC: 3.8 G/DL (ref 3.2–4.9)
ALBUMIN/GLOB SERPL: 1 G/DL
ALP SERPL-CCNC: 152 U/L (ref 30–99)
ALT SERPL-CCNC: 7 U/L (ref 2–50)
ANION GAP SERPL CALC-SCNC: 9 MMOL/L (ref 0–11.9)
AST SERPL-CCNC: 11 U/L (ref 12–45)
BASOPHILS # BLD AUTO: 0.3 % (ref 0–1.8)
BASOPHILS # BLD: 0.02 K/UL (ref 0–0.12)
BILIRUB SERPL-MCNC: 0.4 MG/DL (ref 0.1–1.5)
BUN SERPL-MCNC: 30 MG/DL (ref 8–22)
CALCIUM SERPL-MCNC: 9.6 MG/DL (ref 8.5–10.5)
CHLORIDE SERPL-SCNC: 111 MMOL/L (ref 96–112)
CO2 SERPL-SCNC: 18 MMOL/L (ref 20–33)
CREAT SERPL-MCNC: 1.45 MG/DL (ref 0.5–1.4)
EOSINOPHIL # BLD AUTO: 0.05 K/UL (ref 0–0.51)
EOSINOPHIL NFR BLD: 0.8 % (ref 0–6.9)
ERYTHROCYTE [DISTWIDTH] IN BLOOD BY AUTOMATED COUNT: 45.4 FL (ref 35.9–50)
GLOBULIN SER CALC-MCNC: 4 G/DL (ref 1.9–3.5)
GLUCOSE SERPL-MCNC: 218 MG/DL (ref 65–99)
HCT VFR BLD AUTO: 30.1 % (ref 37–47)
HGB BLD-MCNC: 10.6 G/DL (ref 12–16)
IMM GRANULOCYTES # BLD AUTO: 0.02 K/UL (ref 0–0.11)
IMM GRANULOCYTES NFR BLD AUTO: 0.3 % (ref 0–0.9)
LIPASE SERPL-CCNC: 92 U/L (ref 11–82)
LYMPHOCYTES # BLD AUTO: 2.23 K/UL (ref 1–4.8)
LYMPHOCYTES NFR BLD: 36.9 % (ref 22–41)
MCH RBC QN AUTO: 32.8 PG (ref 27–33)
MCHC RBC AUTO-ENTMCNC: 35.2 G/DL (ref 33.6–35)
MCV RBC AUTO: 93.2 FL (ref 81.4–97.8)
MONOCYTES # BLD AUTO: 0.35 K/UL (ref 0–0.85)
MONOCYTES NFR BLD AUTO: 5.8 % (ref 0–13.4)
NEUTROPHILS # BLD AUTO: 3.37 K/UL (ref 2–7.15)
NEUTROPHILS NFR BLD: 55.9 % (ref 44–72)
NRBC # BLD AUTO: 0 K/UL
NRBC BLD-RTO: 0 /100 WBC
PLATELET # BLD AUTO: 161 K/UL (ref 164–446)
PMV BLD AUTO: 10 FL (ref 9–12.9)
POTASSIUM SERPL-SCNC: 3.9 MMOL/L (ref 3.6–5.5)
PROT SERPL-MCNC: 7.8 G/DL (ref 6–8.2)
RBC # BLD AUTO: 3.23 M/UL (ref 4.2–5.4)
SODIUM SERPL-SCNC: 138 MMOL/L (ref 135–145)
WBC # BLD AUTO: 6 K/UL (ref 4.8–10.8)

## 2018-10-31 PROCEDURE — 76700 US EXAM ABDOM COMPLETE: CPT

## 2018-10-31 PROCEDURE — 96374 THER/PROPH/DIAG INJ IV PUSH: CPT

## 2018-10-31 PROCEDURE — 700111 HCHG RX REV CODE 636 W/ 250 OVERRIDE (IP): Performed by: EMERGENCY MEDICINE

## 2018-10-31 PROCEDURE — 80053 COMPREHEN METABOLIC PANEL: CPT

## 2018-10-31 PROCEDURE — 83690 ASSAY OF LIPASE: CPT

## 2018-10-31 PROCEDURE — 85025 COMPLETE CBC W/AUTO DIFF WBC: CPT

## 2018-10-31 RX ORDER — KETOROLAC TROMETHAMINE 30 MG/ML
10 INJECTION, SOLUTION INTRAMUSCULAR; INTRAVENOUS ONCE
Status: DISCONTINUED | OUTPATIENT
Start: 2018-10-31 | End: 2018-10-31

## 2018-10-31 RX ADMIN — FENTANYL CITRATE 100 MCG: 50 INJECTION, SOLUTION INTRAMUSCULAR; INTRAVENOUS at 00:21

## 2018-10-31 NOTE — ED NOTES
"Patient sleeping, rr even and unlabored. Easily arousable for vitals, states pain \"is much better.\" No needs, will CTM.   "

## 2018-10-31 NOTE — ED PROVIDER NOTES
"ED Provider Note    Scribed for Allan Vásquez M.D. by Allan Vásquez. 10/31/2018,  12:10 AM.    CHIEF COMPLAINT  Chief Complaint   Patient presents with   • RUQ Pain     x30 minutes, 10/10 pain, pt reports \"this is the first time it's happened.\"        HPI  Sandra Rivera is a 68 y.o. female who presents to the Emergency Department after 30 minutes of a sudden onset severe \"10 out of 10\" right upper quadrant pain.  She denies any history of this pain, though does have a history of cholecystectomy in 2017.  She cannot identify any exacerbating relieving factors.  She reports some associated vomiting which is resolved.  She denies constipation or diarrhea.  She has not had fevers or chills.  She has not been feeling ill at all within the last couple of days.  She denies hematuria or dysuria.  She has a history of hypothyroidism, and chronic renal insufficiency and hypertension.  She was admitted here with diffuse abdominal pain on August 31 after a kidney biopsy and found to have a renal hematoma and a hemoglobin of 6.7, and required a transfusion.    REVIEW OF SYSTEMS  See HPI for further details. All other systems are negative.     PAST MEDICAL HISTORY   has a past medical history of Bleeding; Bowel habit changes; Dental disorder; Diabetes; Disorder of thyroid; History of anemia; Hyperlipidemia; Hypertension; and Renal disorder.    SOCIAL HISTORY  Social History     Social History Main Topics   • Smoking status: Never Smoker   • Smokeless tobacco: Never Used   • Alcohol use No   • Drug use: No   • Sexual activity: Not on file     History   Drug Use No       SURGICAL HISTORY   has a past surgical history that includes breast biopsy; us-needle core bx-breast panel; cholecystectomy; other (2002); other (2005); and gyn surgery (1976, 1981).    CURRENT MEDICATIONS  Home Medications    **Home medications have not yet been reviewed for this encounter**         ALLERGIES  Allergies   Allergen Reactions " "  • Metformin Rash     Rash on face and arms       PHYSICAL EXAM  VITAL SIGNS: BP (!) 235/118   Pulse 79   Temp 36.6 °C (97.9 °F)   Resp (!) 42   Ht 1.575 m (5' 2\")   Wt 62 kg (136 lb 11 oz)   SpO2 99%   BMI 25.00 kg/m²   Pulse ox interpretation: I interpret this pulse ox as normal.  Constitutional: Alert in no apparent distress.  HENT: No signs of trauma, Bilateral external ears normal, Nose normal.   Eyes: Conjunctiva normal, Non-icteric.   Neck: Normal range of motion, Supple, No stridor.   Lymphatic: No lymphadenopathy noted.   Cardiovascular: Regular rate and rhythm, no murmurs.   Thorax & Lungs: Normal breath sounds, No respiratory distress, No wheezing, No chest tenderness.   Abdomen: Bowel sounds normal, Soft, mild right upper quadrant tenderness, No masses, No pulsatile masses. No peritoneal signs.  Skin: Warm, Dry, No erythema, No rash.   Extremities: Intact distal pulses, No edema, No cyanosis.  Musculoskeletal: Good range of motion in all major joints. No or major deformities noted.   Neurologic: Alert , Normal motor function, Normal sensory function, No focal deficits noted.   Psychiatric: Affect normal, Judgment normal, Mood normal.     DIAGNOSTIC STUDIES / PROCEDURES      LABS  Labs Reviewed   CBC WITH DIFFERENTIAL - Abnormal; Notable for the following:        Result Value    RBC 3.23 (*)     Hemoglobin 10.6 (*)     Hematocrit 30.1 (*)     MCHC 35.2 (*)     Platelet Count 161 (*)     All other components within normal limits   COMP METABOLIC PANEL - Abnormal; Notable for the following:     Co2 18 (*)     Glucose 218 (*)     Bun 30 (*)     Creatinine 1.45 (*)     AST(SGOT) 11 (*)     Alkaline Phosphatase 152 (*)     Globulin 4.0 (*)     All other components within normal limits   LIPASE - Abnormal; Notable for the following:     Lipase 92 (*)     All other components within normal limits   ESTIMATED GFR - Abnormal; Notable for the following:     GFR If  44 (*)     GFR If Non "  36 (*)     All other components within normal limits     All labs reviewed by me.    RADIOLOGY  US-ABDOMEN COMPLETE SURVEY   Final Result         1.  Echogenic renal cortices, appearance compatible with medical renal disease.   2.  Echogenic liver, compatible with fatty change versus fibrosis.   3.  Mild dilatation of the common bile duct, commonly associated with postcholecystectomy physiology. Consider causes of biliary obstruction with additional workup as clinically appropriate.           The radiologist's interpretation of all radiological studies have been reviewed by me.    COURSE & MEDICAL DECISION MAKING  Nursing notes, VS, PMSFHx reviewed in chart.     12:10 AM Patient seen and examined at bedside. Differential diagnosis includes but is not limited to abdominal pain, pancreatitis, gastritis, fatty liver disease, renal pathology such as kidney stone or ureteral stone or recurrent perinephric hematoma secondary to hypertension. Ordered for screening laboratory tests, and ultrasound of the right upper quadrant and renal ultrasound to evaluate. Patient will be treated with 100 mcg of fentanyl For her symptoms.     2:10 AM this patient has been sleeping comfortably for the last 30 minutes or so, and reports her pain has resolved.  Her ultrasound shows changes consistent with her known medical renal disease, and known fatty liver disease.  She does have mild dilation of the common bile duct, and has a known cholecystectomy.  I do not think there is any strong reason to consider acute biliary obstruction.  Her labs showed a very very slight increase in her lipase.  This may be evidence of mild pancreatitis, though certainly does not need to be admitted to the hospital, considering that her pain is resolved.  She will be discharged in improved condition.  I have directed her to stick to a clear liquid diet for 24 hours, then a soft diet for 24 hours after that.     The patient will return for new  or worsening symptoms and is stable at the time of discharge.    The patient is referred to a primary physician for blood pressure management, diabetic screening, and for all other preventative health concerns.    DISPOSITION:  Patient will be discharged home in stable condition.    FOLLOW UP:  [facility] [place], Emergency Dept  [address] [address] [phone]    If symptoms worsen      OUTPATIENT MEDICATIONS:  New Prescriptions    No medications on file     FINAL IMPRESSION  1. Right upper quadrant abdominal pain Acute

## 2018-10-31 NOTE — ED NOTES
"Pt arrives ambulatory with . AOx4, Spnish speaking.  services used. Patient states she was in normal state of health until 45 minutes PTA she developed severe abdominal pain. Denies eating anything \"out of the ordinary.\" Denies n/v at this time. States pain radiates to lower back. IV placed, labs drawn and sent per orders. RR even and unlabored on room air. Patient appears uncomfortable, medicated per MAR. MD at bedside for eval, will continue to monitor.   "

## 2018-10-31 NOTE — ED TRIAGE NOTES
"Sandra Rivera  68 y.o. female  Chief Complaint   Patient presents with   • RUQ Pain     x30 minutes, 10/10 pain, pt reports \"this is the first time it's happened.\"        Pt does not elaborate, but is in obvious discomfort. Charge RN called.     Pt is alert and oriented, speaking in full sentences, follows commands and responds appropriately to questions. Resp are even and unlabored.    "

## 2018-10-31 NOTE — DISCHARGE INSTRUCTIONS
Tus laboratorios fueron tranquilizadores. Tiene mara irritación muy leve de cardona páncreas. Saline puede causar dolor. Mantenga mara dieta de líquidos paulo deb las próximas 24 horas, luego mara dieta muy suave ashely corrie y huevos revueltos y sin especias deb las próximas 24 horas. Evite los alimentos grasos o picantes para los próximos días. Consulte a cardona médico de atención primaria. Devuelva la yoon de emergencias si está empeorando en lugar de mejorar gradualmente.      (Your labs were reassuring. You have very mild irritation of your pancreas. This can cause pain. Stick to a clear liquid diet for the next 24 hours, then a very bland diet like oatmeal and scrambled eggs and no spices for the next 24 hours. Avoid any fatty or spicy foods for the next few days. See your primary care doctor. Return the ER if you are getting worse instead of gradually improving.)

## 2018-10-31 NOTE — ED NOTES
Pt cleared for discharge. All instructions amd follow up care provided using  services via ipop. Verbalize understanding. IV access removed. Ambulated off unit with steady gait. All safety maintained.

## 2018-11-26 ENCOUNTER — HOSPITAL ENCOUNTER (OUTPATIENT)
Dept: LAB | Facility: MEDICAL CENTER | Age: 68
End: 2018-11-26
Attending: INTERNAL MEDICINE
Payer: COMMERCIAL

## 2018-11-26 LAB
BASOPHILS # BLD AUTO: 0.4 % (ref 0–1.8)
BASOPHILS # BLD: 0.02 K/UL (ref 0–0.12)
EOSINOPHIL # BLD AUTO: 0.06 K/UL (ref 0–0.51)
EOSINOPHIL NFR BLD: 1.2 % (ref 0–6.9)
ERYTHROCYTE [DISTWIDTH] IN BLOOD BY AUTOMATED COUNT: 45.9 FL (ref 35.9–50)
HCT VFR BLD AUTO: 30.3 % (ref 37–47)
HGB BLD-MCNC: 10.1 G/DL (ref 12–16)
IMM GRANULOCYTES # BLD AUTO: 0.01 K/UL (ref 0–0.11)
IMM GRANULOCYTES NFR BLD AUTO: 0.2 % (ref 0–0.9)
LYMPHOCYTES # BLD AUTO: 1.59 K/UL (ref 1–4.8)
LYMPHOCYTES NFR BLD: 30.6 % (ref 22–41)
MCH RBC QN AUTO: 32.3 PG (ref 27–33)
MCHC RBC AUTO-ENTMCNC: 33.3 G/DL (ref 33.6–35)
MCV RBC AUTO: 96.8 FL (ref 81.4–97.8)
MONOCYTES # BLD AUTO: 0.3 K/UL (ref 0–0.85)
MONOCYTES NFR BLD AUTO: 5.8 % (ref 0–13.4)
NEUTROPHILS # BLD AUTO: 3.22 K/UL (ref 2–7.15)
NEUTROPHILS NFR BLD: 61.8 % (ref 44–72)
NRBC # BLD AUTO: 0 K/UL
NRBC BLD-RTO: 0 /100 WBC
PLATELET # BLD AUTO: 185 K/UL (ref 164–446)
PMV BLD AUTO: 10.4 FL (ref 9–12.9)
RBC # BLD AUTO: 3.13 M/UL (ref 4.2–5.4)
WBC # BLD AUTO: 5.2 K/UL (ref 4.8–10.8)

## 2018-11-26 PROCEDURE — 83540 ASSAY OF IRON: CPT

## 2018-11-26 PROCEDURE — 36415 COLL VENOUS BLD VENIPUNCTURE: CPT

## 2018-11-26 PROCEDURE — 80053 COMPREHEN METABOLIC PANEL: CPT

## 2018-11-26 PROCEDURE — 82728 ASSAY OF FERRITIN: CPT

## 2018-11-26 PROCEDURE — 85025 COMPLETE CBC W/AUTO DIFF WBC: CPT

## 2018-11-26 PROCEDURE — 83550 IRON BINDING TEST: CPT

## 2018-11-27 LAB
ALBUMIN SERPL BCP-MCNC: 3.7 G/DL (ref 3.2–4.9)
ALBUMIN/GLOB SERPL: 0.9 G/DL
ALP SERPL-CCNC: 142 U/L (ref 30–99)
ALT SERPL-CCNC: 8 U/L (ref 2–50)
ANION GAP SERPL CALC-SCNC: 8 MMOL/L (ref 0–11.9)
AST SERPL-CCNC: 10 U/L (ref 12–45)
BILIRUB SERPL-MCNC: 0.4 MG/DL (ref 0.1–1.5)
BUN SERPL-MCNC: 36 MG/DL (ref 8–22)
CALCIUM SERPL-MCNC: 9.2 MG/DL (ref 8.5–10.5)
CHLORIDE SERPL-SCNC: 114 MMOL/L (ref 96–112)
CO2 SERPL-SCNC: 19 MMOL/L (ref 20–33)
CREAT SERPL-MCNC: 1.72 MG/DL (ref 0.5–1.4)
FERRITIN SERPL-MCNC: 261.2 NG/ML (ref 10–291)
GLOBULIN SER CALC-MCNC: 4.1 G/DL (ref 1.9–3.5)
GLUCOSE SERPL-MCNC: 165 MG/DL (ref 65–99)
IRON SATN MFR SERPL: 43 % (ref 15–55)
IRON SERPL-MCNC: 126 UG/DL (ref 40–170)
POTASSIUM SERPL-SCNC: 4.5 MMOL/L (ref 3.6–5.5)
PROT SERPL-MCNC: 7.8 G/DL (ref 6–8.2)
SODIUM SERPL-SCNC: 141 MMOL/L (ref 135–145)
TIBC SERPL-MCNC: 290 UG/DL (ref 250–450)

## 2019-01-04 ENCOUNTER — APPOINTMENT (OUTPATIENT)
Dept: LAB | Facility: MEDICAL CENTER | Age: 69
End: 2019-01-04
Attending: NURSE PRACTITIONER
Payer: MEDICARE

## 2019-02-24 ENCOUNTER — HOSPITAL ENCOUNTER (OUTPATIENT)
Facility: MEDICAL CENTER | Age: 69
End: 2019-02-25
Attending: EMERGENCY MEDICINE | Admitting: INTERNAL MEDICINE
Payer: MEDICARE

## 2019-02-24 ENCOUNTER — APPOINTMENT (OUTPATIENT)
Dept: RADIOLOGY | Facility: MEDICAL CENTER | Age: 69
End: 2019-02-24
Attending: EMERGENCY MEDICINE
Payer: MEDICARE

## 2019-02-24 DIAGNOSIS — R07.9 CHEST PAIN, UNSPECIFIED TYPE: ICD-10-CM

## 2019-02-24 PROBLEM — E87.1 HYPONATREMIA: Status: ACTIVE | Noted: 2019-02-24

## 2019-02-24 PROBLEM — R47.89 EPISODE OF CHANGE IN SPEECH: Status: ACTIVE | Noted: 2019-02-24

## 2019-02-24 LAB
ALBUMIN SERPL BCP-MCNC: 3.8 G/DL (ref 3.2–4.9)
ALBUMIN/GLOB SERPL: 0.8 G/DL
ALP SERPL-CCNC: 164 U/L (ref 30–99)
ALT SERPL-CCNC: 12 U/L (ref 2–50)
ANION GAP SERPL CALC-SCNC: 9 MMOL/L (ref 0–11.9)
AST SERPL-CCNC: 15 U/L (ref 12–45)
BASOPHILS # BLD AUTO: 0.2 % (ref 0–1.8)
BASOPHILS # BLD: 0.01 K/UL (ref 0–0.12)
BILIRUB SERPL-MCNC: 0.4 MG/DL (ref 0.1–1.5)
BUN SERPL-MCNC: 29 MG/DL (ref 8–22)
CALCIUM SERPL-MCNC: 9 MG/DL (ref 8.5–10.5)
CHLORIDE SERPL-SCNC: 99 MMOL/L (ref 96–112)
CO2 SERPL-SCNC: 21 MMOL/L (ref 20–33)
CREAT SERPL-MCNC: 2.03 MG/DL (ref 0.5–1.4)
EKG IMPRESSION: NORMAL
EOSINOPHIL # BLD AUTO: 0.01 K/UL (ref 0–0.51)
EOSINOPHIL NFR BLD: 0.2 % (ref 0–6.9)
ERYTHROCYTE [DISTWIDTH] IN BLOOD BY AUTOMATED COUNT: 42.2 FL (ref 35.9–50)
GLOBULIN SER CALC-MCNC: 4.5 G/DL (ref 1.9–3.5)
GLUCOSE SERPL-MCNC: 312 MG/DL (ref 65–99)
HCT VFR BLD AUTO: 32.9 % (ref 37–47)
HGB BLD-MCNC: 11.4 G/DL (ref 12–16)
IMM GRANULOCYTES # BLD AUTO: 0.01 K/UL (ref 0–0.11)
IMM GRANULOCYTES NFR BLD AUTO: 0.2 % (ref 0–0.9)
LIPASE SERPL-CCNC: 139 U/L (ref 11–82)
LYMPHOCYTES # BLD AUTO: 1.6 K/UL (ref 1–4.8)
LYMPHOCYTES NFR BLD: 24.7 % (ref 22–41)
MCH RBC QN AUTO: 32.4 PG (ref 27–33)
MCHC RBC AUTO-ENTMCNC: 34.7 G/DL (ref 33.6–35)
MCV RBC AUTO: 93.5 FL (ref 81.4–97.8)
MONOCYTES # BLD AUTO: 0.37 K/UL (ref 0–0.85)
MONOCYTES NFR BLD AUTO: 5.7 % (ref 0–13.4)
NEUTROPHILS # BLD AUTO: 4.48 K/UL (ref 2–7.15)
NEUTROPHILS NFR BLD: 69 % (ref 44–72)
NRBC # BLD AUTO: 0 K/UL
NRBC BLD-RTO: 0 /100 WBC
PLATELET # BLD AUTO: 179 K/UL (ref 164–446)
PMV BLD AUTO: 10 FL (ref 9–12.9)
POTASSIUM SERPL-SCNC: 4.7 MMOL/L (ref 3.6–5.5)
PROT SERPL-MCNC: 8.3 G/DL (ref 6–8.2)
RBC # BLD AUTO: 3.52 M/UL (ref 4.2–5.4)
SODIUM SERPL-SCNC: 129 MMOL/L (ref 135–145)
TROPONIN I SERPL-MCNC: <0.01 NG/ML (ref 0–0.04)
WBC # BLD AUTO: 6.5 K/UL (ref 4.8–10.8)

## 2019-02-24 PROCEDURE — 700102 HCHG RX REV CODE 250 W/ 637 OVERRIDE(OP): Performed by: EMERGENCY MEDICINE

## 2019-02-24 PROCEDURE — 36415 COLL VENOUS BLD VENIPUNCTURE: CPT

## 2019-02-24 PROCEDURE — 99285 EMERGENCY DEPT VISIT HI MDM: CPT

## 2019-02-24 PROCEDURE — 700102 HCHG RX REV CODE 250 W/ 637 OVERRIDE(OP): Performed by: INTERNAL MEDICINE

## 2019-02-24 PROCEDURE — 70450 CT HEAD/BRAIN W/O DYE: CPT

## 2019-02-24 PROCEDURE — 700105 HCHG RX REV CODE 258: Performed by: INTERNAL MEDICINE

## 2019-02-24 PROCEDURE — 93005 ELECTROCARDIOGRAM TRACING: CPT

## 2019-02-24 PROCEDURE — 99220 PR INITIAL OBSERVATION CARE,LEVL III: CPT | Performed by: INTERNAL MEDICINE

## 2019-02-24 PROCEDURE — G0378 HOSPITAL OBSERVATION PER HR: HCPCS

## 2019-02-24 PROCEDURE — 93005 ELECTROCARDIOGRAM TRACING: CPT | Performed by: EMERGENCY MEDICINE

## 2019-02-24 PROCEDURE — 84484 ASSAY OF TROPONIN QUANT: CPT

## 2019-02-24 PROCEDURE — 82962 GLUCOSE BLOOD TEST: CPT

## 2019-02-24 PROCEDURE — A9270 NON-COVERED ITEM OR SERVICE: HCPCS | Performed by: EMERGENCY MEDICINE

## 2019-02-24 PROCEDURE — 85025 COMPLETE CBC W/AUTO DIFF WBC: CPT

## 2019-02-24 PROCEDURE — 83690 ASSAY OF LIPASE: CPT

## 2019-02-24 PROCEDURE — A9270 NON-COVERED ITEM OR SERVICE: HCPCS | Performed by: INTERNAL MEDICINE

## 2019-02-24 PROCEDURE — 80053 COMPREHEN METABOLIC PANEL: CPT

## 2019-02-24 PROCEDURE — 94760 N-INVAS EAR/PLS OXIMETRY 1: CPT

## 2019-02-24 RX ORDER — RANITIDINE 150 MG/1
150 TABLET ORAL
Status: DISCONTINUED | OUTPATIENT
Start: 2019-02-24 | End: 2019-02-24

## 2019-02-24 RX ORDER — ASPIRIN 325 MG
325 TABLET ORAL DAILY
Status: DISCONTINUED | OUTPATIENT
Start: 2019-02-24 | End: 2019-02-25

## 2019-02-24 RX ORDER — HEPARIN SODIUM 5000 [USP'U]/ML
5000 INJECTION, SOLUTION INTRAVENOUS; SUBCUTANEOUS EVERY 8 HOURS
Status: DISCONTINUED | OUTPATIENT
Start: 2019-02-25 | End: 2019-02-25 | Stop reason: HOSPADM

## 2019-02-24 RX ORDER — AMOXICILLIN 250 MG
2 CAPSULE ORAL 2 TIMES DAILY
Status: DISCONTINUED | OUTPATIENT
Start: 2019-02-24 | End: 2019-02-25

## 2019-02-24 RX ORDER — FAMOTIDINE 20 MG/1
20 TABLET, FILM COATED ORAL EVERY EVENING
Status: DISCONTINUED | OUTPATIENT
Start: 2019-02-24 | End: 2019-02-25 | Stop reason: HOSPADM

## 2019-02-24 RX ORDER — BISACODYL 10 MG
10 SUPPOSITORY, RECTAL RECTAL
Status: DISCONTINUED | OUTPATIENT
Start: 2019-02-24 | End: 2019-02-25

## 2019-02-24 RX ORDER — SODIUM CHLORIDE 9 MG/ML
INJECTION, SOLUTION INTRAVENOUS CONTINUOUS
Status: DISCONTINUED | OUTPATIENT
Start: 2019-02-24 | End: 2019-02-25

## 2019-02-24 RX ORDER — ASPIRIN 325 MG
325 TABLET ORAL ONCE
Status: COMPLETED | OUTPATIENT
Start: 2019-02-24 | End: 2019-02-24

## 2019-02-24 RX ORDER — ASPIRIN 300 MG/1
300 SUPPOSITORY RECTAL DAILY
Status: DISCONTINUED | OUTPATIENT
Start: 2019-02-24 | End: 2019-02-25

## 2019-02-24 RX ORDER — POLYETHYLENE GLYCOL 3350 17 G/17G
1 POWDER, FOR SOLUTION ORAL
Status: DISCONTINUED | OUTPATIENT
Start: 2019-02-24 | End: 2019-02-25

## 2019-02-24 RX ORDER — ACETAMINOPHEN 325 MG/1
650 TABLET ORAL EVERY 6 HOURS PRN
Status: DISCONTINUED | OUTPATIENT
Start: 2019-02-24 | End: 2019-02-25 | Stop reason: HOSPADM

## 2019-02-24 RX ORDER — IBUPROFEN 200 MG
500 CAPSULE ORAL DAILY
COMMUNITY

## 2019-02-24 RX ORDER — ASPIRIN 81 MG/1
324 TABLET, CHEWABLE ORAL DAILY
Status: DISCONTINUED | OUTPATIENT
Start: 2019-02-24 | End: 2019-02-25

## 2019-02-24 RX ORDER — INSULIN GLARGINE 100 [IU]/ML
30 INJECTION, SOLUTION SUBCUTANEOUS EVERY EVENING
Status: DISCONTINUED | OUTPATIENT
Start: 2019-02-24 | End: 2019-02-25 | Stop reason: HOSPADM

## 2019-02-24 RX ORDER — LEVOTHYROXINE SODIUM 112 UG/1
112 TABLET ORAL
Status: DISCONTINUED | OUTPATIENT
Start: 2019-02-25 | End: 2019-02-25 | Stop reason: HOSPADM

## 2019-02-24 RX ORDER — ATORVASTATIN CALCIUM 80 MG/1
80 TABLET, FILM COATED ORAL EVERY EVENING
Status: DISCONTINUED | OUTPATIENT
Start: 2019-02-24 | End: 2019-02-25 | Stop reason: HOSPADM

## 2019-02-24 RX ORDER — DEXTROSE MONOHYDRATE 25 G/50ML
25 INJECTION, SOLUTION INTRAVENOUS
Status: DISCONTINUED | OUTPATIENT
Start: 2019-02-24 | End: 2019-02-25 | Stop reason: HOSPADM

## 2019-02-24 RX ADMIN — ASPIRIN 325 MG: 325 TABLET, FILM COATED ORAL at 20:30

## 2019-02-24 RX ADMIN — FAMOTIDINE 20 MG: 20 TABLET ORAL at 23:34

## 2019-02-24 RX ADMIN — SODIUM CHLORIDE: 9 INJECTION, SOLUTION INTRAVENOUS at 23:42

## 2019-02-24 ASSESSMENT — PATIENT HEALTH QUESTIONNAIRE - PHQ9
SUM OF ALL RESPONSES TO PHQ9 QUESTIONS 1 AND 2: 0
1. LITTLE INTEREST OR PLEASURE IN DOING THINGS: NOT AT ALL
2. FEELING DOWN, DEPRESSED, IRRITABLE, OR HOPELESS: NOT AT ALL

## 2019-02-24 ASSESSMENT — ENCOUNTER SYMPTOMS
BRUISES/BLEEDS EASILY: 0
ABDOMINAL PAIN: 0
BLURRED VISION: 0
DIARRHEA: 0
NECK PAIN: 0
DIZZINESS: 0
FOCAL WEAKNESS: 0
SPEECH CHANGE: 1
FEVER: 0
BACK PAIN: 0
HEADACHES: 0
BLOOD IN STOOL: 0
NAUSEA: 0
SORE THROAT: 0
COUGH: 0
WHEEZING: 0
SEIZURES: 0
FLANK PAIN: 0
DIAPHORESIS: 0
SHORTNESS OF BREATH: 1
CHILLS: 0
SPUTUM PRODUCTION: 0
MYALGIAS: 0
VOMITING: 0
PALPITATIONS: 0

## 2019-02-24 ASSESSMENT — COPD QUESTIONNAIRES
DO YOU EVER COUGH UP ANY MUCUS OR PHLEGM?: NO/ONLY WITH OCCASIONAL COLDS OR INFECTIONS
DO YOU EVER COUGH UP ANY MUCUS OR PHLEGM?: NO/ONLY WITH OCCASIONAL COLDS OR INFECTIONS
DURING THE PAST 4 WEEKS HOW MUCH DID YOU FEEL SHORT OF BREATH: NONE/LITTLE OF THE TIME
COPD SCREENING SCORE: 2
DURING THE PAST 4 WEEKS HOW MUCH DID YOU FEEL SHORT OF BREATH: NONE/LITTLE OF THE TIME
HAVE YOU SMOKED AT LEAST 100 CIGARETTES IN YOUR ENTIRE LIFE: NO/DON'T KNOW
IN THE PAST 12 MONTHS DO YOU DO LESS THAN YOU USED TO BECAUSE OF YOUR BREATHING PROBLEMS: DISAGREE/UNSURE
HAVE YOU SMOKED AT LEAST 100 CIGARETTES IN YOUR ENTIRE LIFE: NO/DON'T KNOW
COPD SCREENING SCORE: 2

## 2019-02-24 ASSESSMENT — LIFESTYLE VARIABLES
EVER_SMOKED: NEVER
ALCOHOL_USE: NO
EVER_SMOKED: NEVER

## 2019-02-24 ASSESSMENT — PAIN DESCRIPTION - DESCRIPTORS: DESCRIPTORS: ACHING

## 2019-02-25 ENCOUNTER — APPOINTMENT (OUTPATIENT)
Dept: RADIOLOGY | Facility: MEDICAL CENTER | Age: 69
End: 2019-02-25
Attending: INTERNAL MEDICINE
Payer: MEDICARE

## 2019-02-25 ENCOUNTER — PATIENT OUTREACH (OUTPATIENT)
Dept: HEALTH INFORMATION MANAGEMENT | Facility: OTHER | Age: 69
End: 2019-02-25

## 2019-02-25 ENCOUNTER — APPOINTMENT (OUTPATIENT)
Dept: CARDIOLOGY | Facility: MEDICAL CENTER | Age: 69
End: 2019-02-25
Attending: INTERNAL MEDICINE
Payer: MEDICARE

## 2019-02-25 VITALS
HEART RATE: 85 BPM | DIASTOLIC BLOOD PRESSURE: 81 MMHG | HEIGHT: 62 IN | RESPIRATION RATE: 18 BRPM | TEMPERATURE: 99.8 F | OXYGEN SATURATION: 98 % | WEIGHT: 136.91 LBS | SYSTOLIC BLOOD PRESSURE: 174 MMHG | BODY MASS INDEX: 25.19 KG/M2

## 2019-02-25 PROBLEM — R47.89 EPISODE OF CHANGE IN SPEECH: Status: RESOLVED | Noted: 2019-02-24 | Resolved: 2019-02-25

## 2019-02-25 PROBLEM — N18.30 ACUTE RENAL FAILURE SUPERIMPOSED ON STAGE 3 CHRONIC KIDNEY DISEASE (HCC): Status: RESOLVED | Noted: 2018-04-20 | Resolved: 2019-02-25

## 2019-02-25 PROBLEM — R74.8 ELEVATED LIPASE: Status: ACTIVE | Noted: 2019-02-25

## 2019-02-25 PROBLEM — R07.9 CHEST PAIN: Status: RESOLVED | Noted: 2019-02-24 | Resolved: 2019-02-25

## 2019-02-25 PROBLEM — N17.9 ACUTE RENAL FAILURE SUPERIMPOSED ON STAGE 3 CHRONIC KIDNEY DISEASE (HCC): Status: ACTIVE | Noted: 2018-04-20

## 2019-02-25 PROBLEM — N17.9 ACUTE RENAL FAILURE SUPERIMPOSED ON STAGE 3 CHRONIC KIDNEY DISEASE (HCC): Status: RESOLVED | Noted: 2018-04-20 | Resolved: 2019-02-25

## 2019-02-25 LAB
ANION GAP SERPL CALC-SCNC: 8 MMOL/L (ref 0–11.9)
BASOPHILS # BLD AUTO: 0.4 % (ref 0–1.8)
BASOPHILS # BLD: 0.02 K/UL (ref 0–0.12)
BUN SERPL-MCNC: 26 MG/DL (ref 8–22)
CALCIUM SERPL-MCNC: 8.7 MG/DL (ref 8.5–10.5)
CHLORIDE SERPL-SCNC: 107 MMOL/L (ref 96–112)
CHOLEST SERPL-MCNC: 149 MG/DL (ref 100–199)
CO2 SERPL-SCNC: 19 MMOL/L (ref 20–33)
CREAT SERPL-MCNC: 1.67 MG/DL (ref 0.5–1.4)
EKG IMPRESSION: NORMAL
EOSINOPHIL # BLD AUTO: 0.05 K/UL (ref 0–0.51)
EOSINOPHIL NFR BLD: 0.9 % (ref 0–6.9)
ERYTHROCYTE [DISTWIDTH] IN BLOOD BY AUTOMATED COUNT: 42.5 FL (ref 35.9–50)
EST. AVERAGE GLUCOSE BLD GHB EST-MCNC: 255 MG/DL
GLUCOSE BLD-MCNC: 199 MG/DL (ref 65–99)
GLUCOSE BLD-MCNC: 226 MG/DL (ref 65–99)
GLUCOSE BLD-MCNC: 259 MG/DL (ref 65–99)
GLUCOSE BLD-MCNC: 285 MG/DL (ref 65–99)
GLUCOSE SERPL-MCNC: 262 MG/DL (ref 65–99)
HBA1C MFR BLD: 10.5 % (ref 0–5.6)
HCT VFR BLD AUTO: 27.9 % (ref 37–47)
HDLC SERPL-MCNC: 28 MG/DL
HGB BLD-MCNC: 9.8 G/DL (ref 12–16)
IMM GRANULOCYTES # BLD AUTO: 0.01 K/UL (ref 0–0.11)
IMM GRANULOCYTES NFR BLD AUTO: 0.2 % (ref 0–0.9)
LDLC SERPL CALC-MCNC: 54 MG/DL
LV EJECT FRACT  99904: 60
LV EJECT FRACT MOD 2C 99903: 56.41
LV EJECT FRACT MOD 4C 99902: 62.46
LV EJECT FRACT MOD BP 99901: 64.07
LYMPHOCYTES # BLD AUTO: 1.72 K/UL (ref 1–4.8)
LYMPHOCYTES NFR BLD: 30.1 % (ref 22–41)
MAGNESIUM SERPL-MCNC: 2.2 MG/DL (ref 1.5–2.5)
MCH RBC QN AUTO: 33.2 PG (ref 27–33)
MCHC RBC AUTO-ENTMCNC: 35.1 G/DL (ref 33.6–35)
MCV RBC AUTO: 94.6 FL (ref 81.4–97.8)
MONOCYTES # BLD AUTO: 0.41 K/UL (ref 0–0.85)
MONOCYTES NFR BLD AUTO: 7.2 % (ref 0–13.4)
NEUTROPHILS # BLD AUTO: 3.5 K/UL (ref 2–7.15)
NEUTROPHILS NFR BLD: 61.2 % (ref 44–72)
NRBC # BLD AUTO: 0 K/UL
NRBC BLD-RTO: 0 /100 WBC
PLATELET # BLD AUTO: 164 K/UL (ref 164–446)
PMV BLD AUTO: 10.5 FL (ref 9–12.9)
POTASSIUM SERPL-SCNC: 4.2 MMOL/L (ref 3.6–5.5)
RBC # BLD AUTO: 2.95 M/UL (ref 4.2–5.4)
SODIUM SERPL-SCNC: 134 MMOL/L (ref 135–145)
TRIGL SERPL-MCNC: 334 MG/DL (ref 0–149)
TROPONIN I SERPL-MCNC: <0.01 NG/ML (ref 0–0.04)
TROPONIN I SERPL-MCNC: <0.01 NG/ML (ref 0–0.04)
TSH SERPL DL<=0.005 MIU/L-ACNC: 6.36 UIU/ML (ref 0.38–5.33)
WBC # BLD AUTO: 5.7 K/UL (ref 4.8–10.8)

## 2019-02-25 PROCEDURE — 700102 HCHG RX REV CODE 250 W/ 637 OVERRIDE(OP): Performed by: INTERNAL MEDICINE

## 2019-02-25 PROCEDURE — 700111 HCHG RX REV CODE 636 W/ 250 OVERRIDE (IP): Performed by: INTERNAL MEDICINE

## 2019-02-25 PROCEDURE — 96372 THER/PROPH/DIAG INJ SC/IM: CPT | Mod: XU

## 2019-02-25 PROCEDURE — 70547 MR ANGIOGRAPHY NECK W/O DYE: CPT

## 2019-02-25 PROCEDURE — A9270 NON-COVERED ITEM OR SERVICE: HCPCS | Performed by: INTERNAL MEDICINE

## 2019-02-25 PROCEDURE — 84443 ASSAY THYROID STIM HORMONE: CPT

## 2019-02-25 PROCEDURE — 93306 TTE W/DOPPLER COMPLETE: CPT | Mod: 26 | Performed by: INTERNAL MEDICINE

## 2019-02-25 PROCEDURE — 85025 COMPLETE CBC W/AUTO DIFF WBC: CPT

## 2019-02-25 PROCEDURE — 83036 HEMOGLOBIN GLYCOSYLATED A1C: CPT

## 2019-02-25 PROCEDURE — 70551 MRI BRAIN STEM W/O DYE: CPT

## 2019-02-25 PROCEDURE — A9502 TC99M TETROFOSMIN: HCPCS

## 2019-02-25 PROCEDURE — 84484 ASSAY OF TROPONIN QUANT: CPT

## 2019-02-25 PROCEDURE — 93306 TTE W/DOPPLER COMPLETE: CPT

## 2019-02-25 PROCEDURE — 306588 SLEEVE,VASO CALF MED: Performed by: INTERNAL MEDICINE

## 2019-02-25 PROCEDURE — 82962 GLUCOSE BLOOD TEST: CPT | Mod: 91

## 2019-02-25 PROCEDURE — G0378 HOSPITAL OBSERVATION PER HR: HCPCS

## 2019-02-25 PROCEDURE — A9270 NON-COVERED ITEM OR SERVICE: HCPCS | Performed by: NURSE PRACTITIONER

## 2019-02-25 PROCEDURE — 80048 BASIC METABOLIC PNL TOTAL CA: CPT

## 2019-02-25 PROCEDURE — 93005 ELECTROCARDIOGRAM TRACING: CPT | Performed by: INTERNAL MEDICINE

## 2019-02-25 PROCEDURE — 83735 ASSAY OF MAGNESIUM: CPT

## 2019-02-25 PROCEDURE — 99217 PR OBSERVATION CARE DISCHARGE: CPT | Performed by: INTERNAL MEDICINE

## 2019-02-25 PROCEDURE — 80061 LIPID PANEL: CPT

## 2019-02-25 PROCEDURE — 97535 SELF CARE MNGMENT TRAINING: CPT

## 2019-02-25 PROCEDURE — 93010 ELECTROCARDIOGRAM REPORT: CPT | Performed by: INTERNAL MEDICINE

## 2019-02-25 PROCEDURE — 70544 MR ANGIOGRAPHY HEAD W/O DYE: CPT

## 2019-02-25 PROCEDURE — 700102 HCHG RX REV CODE 250 W/ 637 OVERRIDE(OP): Performed by: NURSE PRACTITIONER

## 2019-02-25 PROCEDURE — 71045 X-RAY EXAM CHEST 1 VIEW: CPT

## 2019-02-25 RX ORDER — ATORVASTATIN CALCIUM 40 MG/1
40 TABLET, FILM COATED ORAL EVERY EVENING
Qty: 30 TAB | Refills: 1 | Status: SHIPPED | OUTPATIENT
Start: 2019-02-25

## 2019-02-25 RX ORDER — REGADENOSON 0.08 MG/ML
INJECTION, SOLUTION INTRAVENOUS
Status: COMPLETED
Start: 2019-02-25 | End: 2019-02-25

## 2019-02-25 RX ORDER — ASPIRIN 81 MG/1
81 TABLET ORAL DAILY
Qty: 30 TAB | Refills: 1 | Status: ON HOLD | OUTPATIENT
Start: 2019-02-25 | End: 2019-11-06

## 2019-02-25 RX ORDER — AMLODIPINE BESYLATE 10 MG/1
10 TABLET ORAL
Status: DISCONTINUED | OUTPATIENT
Start: 2019-02-25 | End: 2019-02-25 | Stop reason: HOSPADM

## 2019-02-25 RX ORDER — LISINOPRIL 10 MG/1
5 TABLET ORAL
Status: DISCONTINUED | OUTPATIENT
Start: 2019-02-25 | End: 2019-02-25 | Stop reason: HOSPADM

## 2019-02-25 RX ORDER — LISINOPRIL 5 MG/1
20 TABLET ORAL DAILY
Qty: 30 TAB | Refills: 1 | Status: ON HOLD | OUTPATIENT
Start: 2019-02-25 | End: 2019-11-07

## 2019-02-25 RX ADMIN — HEPARIN SODIUM 5000 UNITS: 5000 INJECTION, SOLUTION INTRAVENOUS; SUBCUTANEOUS at 06:14

## 2019-02-25 RX ADMIN — VITAMIN D 2000 UNITS: 25 TAB ORAL at 06:14

## 2019-02-25 RX ADMIN — INSULIN HUMAN 5 UNITS: 100 INJECTION, SOLUTION PARENTERAL at 17:39

## 2019-02-25 RX ADMIN — METOPROLOL TARTRATE 25 MG: 25 TABLET, FILM COATED ORAL at 17:33

## 2019-02-25 RX ADMIN — AMLODIPINE BESYLATE 10 MG: 10 TABLET ORAL at 16:35

## 2019-02-25 RX ADMIN — INSULIN GLARGINE 30 UNITS: 100 INJECTION, SOLUTION SUBCUTANEOUS at 00:12

## 2019-02-25 RX ADMIN — FAMOTIDINE 20 MG: 20 TABLET ORAL at 17:34

## 2019-02-25 RX ADMIN — INSULIN HUMAN 2 UNITS: 100 INJECTION, SOLUTION PARENTERAL at 06:18

## 2019-02-25 RX ADMIN — LISINOPRIL 5 MG: 10 TABLET ORAL at 15:49

## 2019-02-25 RX ADMIN — LEVOTHYROXINE SODIUM 112 MCG: 112 TABLET ORAL at 06:14

## 2019-02-25 RX ADMIN — INSULIN GLARGINE 30 UNITS: 100 INJECTION, SOLUTION SUBCUTANEOUS at 17:39

## 2019-02-25 RX ADMIN — ASPIRIN 81 MG: 81 TABLET, COATED ORAL at 16:35

## 2019-02-25 RX ADMIN — INSULIN HUMAN 5 UNITS: 100 INJECTION, SOLUTION PARENTERAL at 13:38

## 2019-02-25 RX ADMIN — ATORVASTATIN CALCIUM 80 MG: 80 TABLET, FILM COATED ORAL at 17:33

## 2019-02-25 NOTE — CARE PLAN
Problem: Infection  Goal: Will remain free from infection  Outcome: PROGRESSING AS EXPECTED  Afebrile. Denies chills.     Problem: Respiratory:  Goal: Respiratory status will improve  Outcome: PROGRESSING AS EXPECTED  Pt on RA. Respirations equal and unlabored. No sob.
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
## 2019-02-25 NOTE — ASSESSMENT & PLAN NOTE
Rule out ACS  Initial EKG and troponin negative for ischemia  Continuous cardiac monitoring with serial EKG and troponin  Nuclear medicine cardiac stress test in the morning if troponin remains negative  Patient has been given full dose of aspirin  Check lipid panel, TSH and hemoglobin A1c

## 2019-02-25 NOTE — THERAPY
Attempted PT eval. Pt hypertensive at rest - 180/82. Sitting EOB BP increased to 210/82. Nurse notified, Pt returned to supine with HOB elevated. BP in supine 198/83. Will re-attempt PT eval as able.

## 2019-02-25 NOTE — PROGRESS NOTES
Stroke handout and stress test given to patient daughter. Reviewed. Verbalized understanding and agrees. All questions answered.

## 2019-02-25 NOTE — PROGRESS NOTES
I have personally seen and examined / evaluated the patient, discussed the patient's evaluation & management plan with GENET Stephens and I have reviewed the note below.     I agree with the findings, history, examination and assessment / plan as listed below with changes/addendum as listed below by me in my addendum / attestation separetely.     Patient presented with chest pain, arm pain, expressive aphasia.  EKG on presentation negative for any concerns of ischemia or infarction, findings concerning for left ventricular hypertrophy.  Monitor on telemetry, no events noted.  Troponin cycled and found to be negative.  Negative CT head on presentation.  Echocardiogram with normal RV function.  Normal left ventricular ejection fraction, no wall motion abnormalities.  Left ventricular thickness at upper limit of normal.  Stress test negative for any evidence of ischemia or infarction.  MRI brain negative for any evidence of acute stroke, chest x-ray negative, vascular evaluation of the head and neck with MRA is negative.    Presentation consistent with uncontrolled hypertension, diabetes mellitus.  Hemoglobin A1c is 10.5, underlying diabetic nephropathy.  Hypertriglyceridemia secondary to uncontrolled diabetes mellitus.  Underlying chronic and persistent anemia, this requires ongoing evaluation and monitoring per primary care physician.    Recommend aspirin 81, improve hypertensive control including the use of an ACE inhibitor or ARB in the setting of underlying diabetic nephropathy and ongoing outpatient follow-up with nephrology team.  Continue high intensity statin therapy with atorvastatin 40 or 80 mg.  Close titration of hypertensive regimen per PCP in the outpatient setting.  Needs diabetic education, would recommend initiation of Januvia 50 mg daily.  Patient asymptomatic at this time, eager to discharge home.  Can discharge home with close outpatient PCP follow-up, nephrology follow-up.  Interval BMP, CBC in  1 week with primary care physician.  Primary care physician should consider evaluation for SPEP, UPEP if persistent hyperproteinemia.    Extensive discussion with the patient regarding risks of uncontrolled hypertension and diabetes mellitus.  They verbalized understanding.     Educated regarding sliding scale insulin, monitor and document the scale at home and share with PCP for ongoing titration.    Poppy Mehta M.D.  02/25/19  3:26 PM

## 2019-02-25 NOTE — ASSESSMENT & PLAN NOTE
Patient does not appear to have pancreatitis at this time.  Patient denies abdominal pain, nausea or vomiting  She does not drink alcohol and is status post cholecystectomy.

## 2019-02-25 NOTE — ASSESSMENT & PLAN NOTE
Likely prerenal  IV fluid hydration with normal saline  Monitor BMP and assess response  Avoid IV contrast/nephrotoxins/NSAIDs  Dose adjust meds for decreased GFR

## 2019-02-25 NOTE — DISCHARGE SUMMARY
Discharge Summary    CHIEF COMPLAINT ON ADMISSION  Chief Complaint   Patient presents with   • Chest Pain     x 4 days, L-sided, nonreproducible   • Arm Problem     LEFT, pain radiating from chest, pain waxes and wanes, limited ROM with increased pain - also complainig of slight weakness/numbness, slight improvement since onset 2 days ago   • Aphasia     x 2 days, states that at times she feels like she cannot say the words she wants and is having problems with anunciation       Reason for Admission  Headache, Chest Pain     Admission Date  2/24/2019    CODE STATUS  Full Code    HPI & HOSPITAL COURSE  This is a 69 y.o. female with a past medical history of CKD stage III, hypertension, hyperlipidemia, hypothyroidism, diabetes here with complaints of chest pain, shortness of breath, and expressive aphasia.  On admission EKG was stable.  CT of the head was negative for acute intrarenal processes.  MRI of the head and MRA of the head and neck were negative for acute abnormalities.  Cardiac stress test was negative for ischemia.  Echocardiogram was stable with preserved ejection fraction.  The patient's chest pain and speech changes are completely resolved.  The patient is noted to have uncontrolled blood pressure and uncontrolled diabetes with a hemoglobin A1c of 10.2.  The patient's symptoms are suspected to be secondary to her uncontrolled hypertension and diabetes.  The patient was started on sliding scale insulin and Januvia in addition to her home dose of Lantus.  She received formal diabetes education while in the hospital.  The patient also was started on lisinopril 20 mg in addition to her home dose of Norvasc.  She is also recommended to begin taking aspirin 81 mg and Lipitor 40 mg daily due to her cardiac risk factors.  She will need to have close outpatient follow-up with her PCP for further management of her diabetes and hypertension.  She needs close monitoring of her renal function.  At this time there is  no further need for acute intervention as the patient's symptoms have completely resolved.  She will be discharged home safely at this time.       Therefore, she is discharged in good and stable condition to home with close outpatient follow-up.    Discharge Date  2/25/2019    DISCHARGE DIAGNOSES  Active Problems:    Type 2 diabetes mellitus with stage 3 chronic kidney disease, with long-term current use of insulin (HCC) POA: Yes    Essential hypertension POA: Yes    Dyslipidemia POA: Yes    Hyponatremia POA: Yes    Elevated lipase POA: Yes  Resolved Problems:    Chest pain POA: Yes    Episode of change in speech POA: Yes    Acute renal failure superimposed on stage 3 chronic kidney disease (HCC) POA: Yes      FOLLOW UP    ESTEBAN Cortez  [facility] [address]  [phone]      PLEASE CALL PCP OFFICE DIRECTLY TO ARRANGE YOUR FOLLOW UP APPOINTMENT. THANK YOU      MEDICATIONS ON DISCHARGE     Medication List      START taking these medications      Instructions   aspirin 81 MG EC tablet   Take 1 Tab by mouth every day.  Dose:  81 mg     atorvastatin 40 MG Tabs  Commonly known as:  LIPITOR   Take 1 Tab by mouth every evening.  Dose:  40 mg     insulin lispro (Human) 100 UNIT/ML Sopn injection  Commonly known as:  HUMALOG KWIKPEN   Inject 2-10 Units as instructed 4 Times a Day,Before Meals and at Bedtime.  Dose:  2-10 Units     lisinopril 5 MG Tabs  Commonly known as:  PRINIVIL   Take 4 Tabs by mouth every day.  Dose:  20 mg     SITagliptin 50 MG Tabs  Commonly known as:  JANUVIA   Take 1 Tab by mouth every day.  Dose:  50 mg        CONTINUE taking these medications      Instructions   amLODIPine 10 MG Tabs  Commonly known as:  NORVASC   Take 10 mg by mouth every bedtime.  Dose:  10 mg     calcium carbonate 1250 (500 Ca) MG Tabs  Commonly known as:  OS-ERICH 500   Take 500 mg by mouth every day.  Dose:  500 mg     LEVEMIR FLEXTOUCH 100 UNIT/ML Sopn injection  Generic drug:  insulin  detemir   Inject 30 Units as instructed every bedtime.  Dose:  30 Units     levothyroxine 112 MCG Tabs  Commonly known as:  SYNTHROID   Take 112 mcg by mouth Every morning on an empty stomach.  Dose:  112 mcg     raNITidine 150 MG Tabs  Commonly known as:  ZANTAC   Take 150 mg by mouth every bedtime.  Dose:  150 mg     vitamin D 1000 UNIT Tabs  Commonly known as:  cholecalciferol   Take 2,000 Units by mouth every day.  Dose:  2000 Units        STOP taking these medications    pravastatin 10 MG Tabs  Commonly known as:  PRAVACHOL            Allergies  Allergies   Allergen Reactions   • Metformin Rash     Rash on face and arms       DIET  Orders Placed This Encounter   Procedures   • Diet Order Cardiac, Renal, Diabetic     Standing Status:   Standing     Number of Occurrences:   1     Order Specific Question:   Diet:     Answer:   Cardiac [6]     Order Specific Question:   Diet:     Answer:   Renal [8]     Order Specific Question:   Diet:     Answer:   Diabetic [3]     Order Specific Question:   Miscellaneous modifications:     Answer:   No Decaf, No Caffeine(for test) [11]     Comments:   Protocol 1313 Patient to have no caffeine for 12 hours prior to exam (decaf, coffee, cola, tea, chocolate)       ACTIVITY  As tolerated.    LABORATORY  Lab Results   Component Value Date    SODIUM 134 (L) 02/25/2019    POTASSIUM 4.2 02/25/2019    CHLORIDE 107 02/25/2019    CO2 19 (L) 02/25/2019    GLUCOSE 262 (H) 02/25/2019    BUN 26 (H) 02/25/2019    CREATININE 1.67 (H) 02/25/2019        Lab Results   Component Value Date    WBC 5.7 02/25/2019    HEMOGLOBIN 9.8 (L) 02/25/2019    HEMATOCRIT 27.9 (L) 02/25/2019    PLATELETCT 164 02/25/2019

## 2019-02-25 NOTE — PROGRESS NOTES
MRI to be completed in approximately 2 hours per MRI tech. Updated patient, verbalized understanding.
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
## 2019-02-25 NOTE — ED NOTES
Med rec updated and complete.  Allergies reviewed . No oral antibiotic use in last  30 days at home.

## 2019-02-25 NOTE — ASSESSMENT & PLAN NOTE
Evaluate for TIA  Patient will be placed on continuous cardiac monitoring to evaluate for any arrhythmias  Q4 hours neuro checks  I will order MRI brain, MRA head and neck  Check 2-D echo  Patient will be started on full dose aspirin and high intensity statin  Allow permissive hypertension  Check TSH, lipid panel and hemoglobin A1c  PTOT and ST evaluation

## 2019-02-25 NOTE — ASSESSMENT & PLAN NOTE
Hypovolemic hyponatremia  IV fluid hydration with normal saline  Monitor BMP and assess response
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
## 2019-02-26 NOTE — PROGRESS NOTES
Discharged to home with family members. To Call PCP and discuss again patient new script to filled.

## 2019-02-26 NOTE — DISCHARGE INSTRUCTIONS
Discharge Instructions    Discharged to home by car with relative. Discharged via walking, hospital escort: Yes.  Special equipment needed: Not Applicable    Be sure to schedule a follow-up appointment with your primary care doctor or any specialists as instructed.     Discharge Plan:   Diet Plan: Discussed  Activity Level: Discussed  Confirmed Follow up Appointment: Patient to Call and Schedule Appointment  Confirmed Symptoms Management: Discussed  Medication Reconciliation Updated: Yes  Influenza Vaccine Indication: Not indicated: Previously immunized this influenza season and > 8 years of age    I understand that a diet low in cholesterol, fat, and sodium is recommended for good health. Unless I have been given specific instructions below for another diet, I accept this instruction as my diet prescription.   Other diet: Diabetic Diet    Special Instructions: None    · Is patient discharged on Warfarin / Coumadin?   No     Depression / Suicide Risk    As you are discharged from this [facility] facility, it is important to learn how to keep safe from harming yourself.    Recognize the warning signs:  · Abrupt changes in personality, positive or negative- including increase in energy   · Giving away possessions  · Change in eating patterns- significant weight changes-  positive or negative  · Change in sleeping patterns- unable to sleep or sleeping all the time   · Unwillingness or inability to communicate  · Depression  · Unusual sadness, discouragement and loneliness  · Talk of wanting to die  · Neglect of personal appearance   · Rebelliousness- reckless behavior  · Withdrawal from people/activities they love  · Confusion- inability to concentrate     If you or a loved one observes any of these behaviors or has concerns about self-harm, here's what you can do:  · Talk about it- your feelings and reasons for harming yourself  · Remove any means that you might use to hurt yourself (examples: pills, rope,  extension cords, firearm)  · Get professional help from the community (Mental Health, Substance Abuse, psychological counseling)  · Do not be alone:Call your Safe Contact- someone whom you trust who will be there for you.  · Call your local CRISIS HOTLINE 906-9689 or 042-368-2923  · Call your local Children's Mobile Crisis Response Team [place] (847) 772-2607 or www.[employer]  · Call the toll free National Suicide Prevention Hotlines   · National Suicide Prevention Lifeline 348-849-TROV (9316)  · National Hope Line Network 800-SUICIDE (385-3106)

## 2019-02-26 NOTE — PROGRESS NOTES
BP much better, did health teaching to patient and family members checking BP. Patient have BP machine, discuss about scripts and coverage to give.

## 2019-02-26 NOTE — PROGRESS NOTES
Diabetic diet and meal education/handouts given to patient in Omani. Report given to GALI Pantoja.

## 2019-02-26 NOTE — CONSULTS
Diabetes education: Met with patient and family today before discharge.  Pt was on Levemir via pen but was admitted with blood sugar of 312 and Hg A1c of 10.5 % ( up from 8/23/18 result of 6.1%). Discussed what diabetes was, what effects blood sugars, goals for blood sugars. Pt has a meter and was testing her blood sugars may be once a day or less frequently, and with results of greater than 200.  Discussed need for fast acting insulin. Pt asked for insulin pens. CDE called [facility] pharmacy who agreed to check on her PCP ( who was working) and get prescriptions changed to be filled there for Humalog kwik pen. Reviewed insulin pens, and hypoglycemia as well as need to test blood sugars ac meals and take Humalog per scale as needed.   All education and handouts given in Maori.

## 2019-11-05 ENCOUNTER — HOSPITAL ENCOUNTER (OUTPATIENT)
Facility: MEDICAL CENTER | Age: 69
End: 2019-11-08
Attending: EMERGENCY MEDICINE | Admitting: INTERNAL MEDICINE
Payer: MEDICARE

## 2019-11-05 ENCOUNTER — APPOINTMENT (OUTPATIENT)
Dept: RADIOLOGY | Facility: MEDICAL CENTER | Age: 69
End: 2019-11-05
Attending: EMERGENCY MEDICINE
Payer: MEDICARE

## 2019-11-05 DIAGNOSIS — D64.9 ANEMIA, UNSPECIFIED TYPE: ICD-10-CM

## 2019-11-05 DIAGNOSIS — R03.0 ELEVATED BLOOD PRESSURE READING: ICD-10-CM

## 2019-11-05 DIAGNOSIS — N17.9 AKI (ACUTE KIDNEY INJURY) (HCC): ICD-10-CM

## 2019-11-05 DIAGNOSIS — R07.9 ACUTE CHEST PAIN: ICD-10-CM

## 2019-11-05 LAB — EKG IMPRESSION: NORMAL

## 2019-11-05 PROCEDURE — 84484 ASSAY OF TROPONIN QUANT: CPT

## 2019-11-05 PROCEDURE — 83880 ASSAY OF NATRIURETIC PEPTIDE: CPT

## 2019-11-05 PROCEDURE — 99285 EMERGENCY DEPT VISIT HI MDM: CPT

## 2019-11-05 PROCEDURE — 93005 ELECTROCARDIOGRAM TRACING: CPT | Performed by: EMERGENCY MEDICINE

## 2019-11-05 PROCEDURE — 80053 COMPREHEN METABOLIC PANEL: CPT

## 2019-11-05 PROCEDURE — 93005 ELECTROCARDIOGRAM TRACING: CPT

## 2019-11-05 PROCEDURE — 85025 COMPLETE CBC W/AUTO DIFF WBC: CPT

## 2019-11-05 PROCEDURE — A9270 NON-COVERED ITEM OR SERVICE: HCPCS | Performed by: EMERGENCY MEDICINE

## 2019-11-05 PROCEDURE — 700102 HCHG RX REV CODE 250 W/ 637 OVERRIDE(OP): Performed by: EMERGENCY MEDICINE

## 2019-11-05 RX ORDER — ASPIRIN 81 MG/1
324 TABLET, CHEWABLE ORAL ONCE
Status: COMPLETED | OUTPATIENT
Start: 2019-11-06 | End: 2019-11-05

## 2019-11-05 RX ORDER — NITROGLYCERIN 0.4 MG/1
0.4 TABLET SUBLINGUAL
Status: DISCONTINUED | OUTPATIENT
Start: 2019-11-05 | End: 2019-11-06

## 2019-11-05 RX ADMIN — NITROGLYCERIN 0.4 MG: 0.4 TABLET, ORALLY DISINTEGRATING SUBLINGUAL at 23:50

## 2019-11-05 RX ADMIN — ASPIRIN 81 MG 324 MG: 81 TABLET ORAL at 23:50

## 2019-11-05 ASSESSMENT — PAIN DESCRIPTION - DESCRIPTORS: DESCRIPTORS: SHARP

## 2019-11-06 ENCOUNTER — PATIENT OUTREACH (OUTPATIENT)
Dept: HEALTH INFORMATION MANAGEMENT | Facility: OTHER | Age: 69
End: 2019-11-06

## 2019-11-06 ENCOUNTER — APPOINTMENT (OUTPATIENT)
Dept: CARDIOLOGY | Facility: MEDICAL CENTER | Age: 69
End: 2019-11-06
Attending: INTERNAL MEDICINE
Payer: MEDICARE

## 2019-11-06 ENCOUNTER — APPOINTMENT (OUTPATIENT)
Dept: RADIOLOGY | Facility: MEDICAL CENTER | Age: 69
End: 2019-11-06
Attending: INTERNAL MEDICINE
Payer: MEDICARE

## 2019-11-06 PROBLEM — R13.10 DYSPHAGIA: Status: ACTIVE | Noted: 2019-11-06

## 2019-11-06 PROBLEM — E11.9 TYPE 2 DIABETES MELLITUS, WITH LONG-TERM CURRENT USE OF INSULIN (HCC): Status: ACTIVE | Noted: 2018-04-20

## 2019-11-06 PROBLEM — E11.22 CKD STAGE 4 DUE TO TYPE 2 DIABETES MELLITUS (HCC): Status: ACTIVE | Noted: 2019-11-06

## 2019-11-06 PROBLEM — N18.4 CKD STAGE 4 DUE TO TYPE 2 DIABETES MELLITUS (HCC): Status: ACTIVE | Noted: 2019-11-06

## 2019-11-06 LAB
ALBUMIN SERPL BCP-MCNC: 3.6 G/DL (ref 3.2–4.9)
ALBUMIN/GLOB SERPL: 0.9 G/DL
ALP SERPL-CCNC: 149 U/L (ref 30–99)
ALT SERPL-CCNC: 8 U/L (ref 2–50)
ANION GAP SERPL CALC-SCNC: 7 MMOL/L (ref 0–11.9)
AST SERPL-CCNC: 14 U/L (ref 12–45)
BASOPHILS # BLD AUTO: 0.3 % (ref 0–1.8)
BASOPHILS # BLD: 0.02 K/UL (ref 0–0.12)
BILIRUB SERPL-MCNC: 0.4 MG/DL (ref 0.1–1.5)
BUN SERPL-MCNC: 30 MG/DL (ref 8–22)
CALCIUM SERPL-MCNC: 9.3 MG/DL (ref 8.5–10.5)
CHLORIDE SERPL-SCNC: 109 MMOL/L (ref 96–112)
CO2 SERPL-SCNC: 20 MMOL/L (ref 20–33)
CREAT SERPL-MCNC: 2.17 MG/DL (ref 0.5–1.4)
EKG IMPRESSION: NORMAL
EKG IMPRESSION: NORMAL
EOSINOPHIL # BLD AUTO: 0.04 K/UL (ref 0–0.51)
EOSINOPHIL NFR BLD: 0.6 % (ref 0–6.9)
ERYTHROCYTE [DISTWIDTH] IN BLOOD BY AUTOMATED COUNT: 48.1 FL (ref 35.9–50)
EST. AVERAGE GLUCOSE BLD GHB EST-MCNC: 111 MG/DL
GLOBULIN SER CALC-MCNC: 3.8 G/DL (ref 1.9–3.5)
GLUCOSE BLD-MCNC: 117 MG/DL (ref 65–99)
GLUCOSE BLD-MCNC: 89 MG/DL (ref 65–99)
GLUCOSE BLD-MCNC: 98 MG/DL (ref 65–99)
GLUCOSE SERPL-MCNC: 118 MG/DL (ref 65–99)
HBA1C MFR BLD: 5.5 % (ref 0–5.6)
HCT VFR BLD AUTO: 30.2 % (ref 37–47)
HGB BLD-MCNC: 10.4 G/DL (ref 12–16)
IMM GRANULOCYTES # BLD AUTO: 0.01 K/UL (ref 0–0.11)
IMM GRANULOCYTES NFR BLD AUTO: 0.2 % (ref 0–0.9)
LV EJECT FRACT  99904: 65
LV EJECT FRACT MOD 2C 99903: 73.97
LV EJECT FRACT MOD 4C 99902: 69.15
LV EJECT FRACT MOD BP 99901: 72.38
LYMPHOCYTES # BLD AUTO: 2.01 K/UL (ref 1–4.8)
LYMPHOCYTES NFR BLD: 32.3 % (ref 22–41)
MCH RBC QN AUTO: 33.2 PG (ref 27–33)
MCHC RBC AUTO-ENTMCNC: 34.4 G/DL (ref 33.6–35)
MCV RBC AUTO: 96.5 FL (ref 81.4–97.8)
MONOCYTES # BLD AUTO: 0.39 K/UL (ref 0–0.85)
MONOCYTES NFR BLD AUTO: 6.3 % (ref 0–13.4)
NEUTROPHILS # BLD AUTO: 3.76 K/UL (ref 2–7.15)
NEUTROPHILS NFR BLD: 60.3 % (ref 44–72)
NRBC # BLD AUTO: 0 K/UL
NRBC BLD-RTO: 0 /100 WBC
NT-PROBNP SERPL IA-MCNC: 3564 PG/ML (ref 0–125)
PLATELET # BLD AUTO: 189 K/UL (ref 164–446)
PMV BLD AUTO: 10 FL (ref 9–12.9)
POTASSIUM SERPL-SCNC: 4.6 MMOL/L (ref 3.6–5.5)
PROT SERPL-MCNC: 7.4 G/DL (ref 6–8.2)
RBC # BLD AUTO: 3.13 M/UL (ref 4.2–5.4)
SODIUM SERPL-SCNC: 136 MMOL/L (ref 135–145)
TROPONIN T SERPL-MCNC: 11 NG/L (ref 6–19)
TROPONIN T SERPL-MCNC: 11 NG/L (ref 6–19)
TROPONIN T SERPL-MCNC: 13 NG/L (ref 6–19)
TSH SERPL DL<=0.005 MIU/L-ACNC: 3.16 UIU/ML (ref 0.38–5.33)
WBC # BLD AUTO: 6.2 K/UL (ref 4.8–10.8)

## 2019-11-06 PROCEDURE — G0378 HOSPITAL OBSERVATION PER HR: HCPCS

## 2019-11-06 PROCEDURE — 93306 TTE W/DOPPLER COMPLETE: CPT | Mod: 26 | Performed by: INTERNAL MEDICINE

## 2019-11-06 PROCEDURE — 36415 COLL VENOUS BLD VENIPUNCTURE: CPT

## 2019-11-06 PROCEDURE — 700102 HCHG RX REV CODE 250 W/ 637 OVERRIDE(OP): Performed by: EMERGENCY MEDICINE

## 2019-11-06 PROCEDURE — 96376 TX/PRO/DX INJ SAME DRUG ADON: CPT | Mod: XU

## 2019-11-06 PROCEDURE — A9270 NON-COVERED ITEM OR SERVICE: HCPCS | Performed by: INTERNAL MEDICINE

## 2019-11-06 PROCEDURE — 96375 TX/PRO/DX INJ NEW DRUG ADDON: CPT | Mod: XU

## 2019-11-06 PROCEDURE — 700102 HCHG RX REV CODE 250 W/ 637 OVERRIDE(OP): Performed by: INTERNAL MEDICINE

## 2019-11-06 PROCEDURE — 84443 ASSAY THYROID STIM HORMONE: CPT

## 2019-11-06 PROCEDURE — 83036 HEMOGLOBIN GLYCOSYLATED A1C: CPT

## 2019-11-06 PROCEDURE — 700111 HCHG RX REV CODE 636 W/ 250 OVERRIDE (IP): Performed by: STUDENT IN AN ORGANIZED HEALTH CARE EDUCATION/TRAINING PROGRAM

## 2019-11-06 PROCEDURE — 82962 GLUCOSE BLOOD TEST: CPT | Mod: 91

## 2019-11-06 PROCEDURE — 96372 THER/PROPH/DIAG INJ SC/IM: CPT | Mod: XU

## 2019-11-06 PROCEDURE — 96374 THER/PROPH/DIAG INJ IV PUSH: CPT | Mod: XU

## 2019-11-06 PROCEDURE — 99220 PR INITIAL OBSERVATION CARE,LEVL III: CPT | Performed by: INTERNAL MEDICINE

## 2019-11-06 PROCEDURE — 700102 HCHG RX REV CODE 250 W/ 637 OVERRIDE(OP): Performed by: STUDENT IN AN ORGANIZED HEALTH CARE EDUCATION/TRAINING PROGRAM

## 2019-11-06 PROCEDURE — 93306 TTE W/DOPPLER COMPLETE: CPT

## 2019-11-06 PROCEDURE — 71046 X-RAY EXAM CHEST 2 VIEWS: CPT

## 2019-11-06 PROCEDURE — 93010 ELECTROCARDIOGRAM REPORT: CPT | Performed by: INTERNAL MEDICINE

## 2019-11-06 PROCEDURE — 700111 HCHG RX REV CODE 636 W/ 250 OVERRIDE (IP): Performed by: INTERNAL MEDICINE

## 2019-11-06 PROCEDURE — A9270 NON-COVERED ITEM OR SERVICE: HCPCS | Performed by: EMERGENCY MEDICINE

## 2019-11-06 PROCEDURE — 93005 ELECTROCARDIOGRAM TRACING: CPT | Performed by: INTERNAL MEDICINE

## 2019-11-06 PROCEDURE — 92610 EVALUATE SWALLOWING FUNCTION: CPT

## 2019-11-06 PROCEDURE — 700101 HCHG RX REV CODE 250: Performed by: INTERNAL MEDICINE

## 2019-11-06 PROCEDURE — A9270 NON-COVERED ITEM OR SERVICE: HCPCS | Performed by: STUDENT IN AN ORGANIZED HEALTH CARE EDUCATION/TRAINING PROGRAM

## 2019-11-06 PROCEDURE — 84484 ASSAY OF TROPONIN QUANT: CPT | Mod: 91

## 2019-11-06 RX ORDER — HYDRALAZINE HYDROCHLORIDE 20 MG/ML
20 INJECTION INTRAMUSCULAR; INTRAVENOUS ONCE
Status: COMPLETED | OUTPATIENT
Start: 2019-11-06 | End: 2019-11-06

## 2019-11-06 RX ORDER — PRAVASTATIN SODIUM 10 MG
10 TABLET ORAL NIGHTLY
COMMUNITY

## 2019-11-06 RX ORDER — LEVOTHYROXINE SODIUM 112 UG/1
112 TABLET ORAL
Status: DISCONTINUED | OUTPATIENT
Start: 2019-11-06 | End: 2019-11-08 | Stop reason: HOSPADM

## 2019-11-06 RX ORDER — ONDANSETRON 2 MG/ML
4 INJECTION INTRAMUSCULAR; INTRAVENOUS EVERY 4 HOURS PRN
Status: DISCONTINUED | OUTPATIENT
Start: 2019-11-06 | End: 2019-11-08 | Stop reason: HOSPADM

## 2019-11-06 RX ORDER — LISINOPRIL 10 MG/1
10 TABLET ORAL
Status: DISCONTINUED | OUTPATIENT
Start: 2019-11-06 | End: 2019-11-06

## 2019-11-06 RX ORDER — LISINOPRIL 20 MG/1
20 TABLET ORAL
Status: DISCONTINUED | OUTPATIENT
Start: 2019-11-07 | End: 2019-11-07

## 2019-11-06 RX ORDER — ATORVASTATIN CALCIUM 40 MG/1
40 TABLET, FILM COATED ORAL EVERY EVENING
Status: DISCONTINUED | OUTPATIENT
Start: 2019-11-06 | End: 2019-11-08 | Stop reason: HOSPADM

## 2019-11-06 RX ORDER — BISACODYL 10 MG
10 SUPPOSITORY, RECTAL RECTAL
Status: DISCONTINUED | OUTPATIENT
Start: 2019-11-06 | End: 2019-11-08 | Stop reason: HOSPADM

## 2019-11-06 RX ORDER — LABETALOL HYDROCHLORIDE 5 MG/ML
10 INJECTION, SOLUTION INTRAVENOUS EVERY 4 HOURS PRN
Status: DISCONTINUED | OUTPATIENT
Start: 2019-11-06 | End: 2019-11-07

## 2019-11-06 RX ORDER — CARVEDILOL 25 MG/1
25 TABLET ORAL 2 TIMES DAILY WITH MEALS
COMMUNITY

## 2019-11-06 RX ORDER — HEPARIN SODIUM 5000 [USP'U]/ML
5000 INJECTION, SOLUTION INTRAVENOUS; SUBCUTANEOUS EVERY 12 HOURS
Status: DISCONTINUED | OUTPATIENT
Start: 2019-11-06 | End: 2019-11-08 | Stop reason: HOSPADM

## 2019-11-06 RX ORDER — ATORVASTATIN CALCIUM 80 MG/1
80 TABLET, FILM COATED ORAL EVERY EVENING
Status: DISCONTINUED | OUTPATIENT
Start: 2019-11-06 | End: 2019-11-06

## 2019-11-06 RX ORDER — OMEPRAZOLE 20 MG/1
20 CAPSULE, DELAYED RELEASE ORAL DAILY
Status: DISCONTINUED | OUTPATIENT
Start: 2019-11-06 | End: 2019-11-08 | Stop reason: HOSPADM

## 2019-11-06 RX ORDER — POLYETHYLENE GLYCOL 3350 17 G/17G
1 POWDER, FOR SOLUTION ORAL
Status: DISCONTINUED | OUTPATIENT
Start: 2019-11-06 | End: 2019-11-08 | Stop reason: HOSPADM

## 2019-11-06 RX ORDER — ACETAMINOPHEN 325 MG/1
650 TABLET ORAL EVERY 6 HOURS PRN
Status: DISCONTINUED | OUTPATIENT
Start: 2019-11-06 | End: 2019-11-08 | Stop reason: HOSPADM

## 2019-11-06 RX ORDER — FERROUS SULFATE 325(65) MG
325 TABLET ORAL 2 TIMES DAILY WITH MEALS
Status: DISCONTINUED | OUTPATIENT
Start: 2019-11-06 | End: 2019-11-08 | Stop reason: HOSPADM

## 2019-11-06 RX ORDER — CARVEDILOL 6.25 MG/1
25 TABLET ORAL 2 TIMES DAILY WITH MEALS
Status: DISCONTINUED | OUTPATIENT
Start: 2019-11-06 | End: 2019-11-08 | Stop reason: HOSPADM

## 2019-11-06 RX ORDER — AMOXICILLIN 250 MG
2 CAPSULE ORAL 2 TIMES DAILY
Status: DISCONTINUED | OUTPATIENT
Start: 2019-11-06 | End: 2019-11-08 | Stop reason: HOSPADM

## 2019-11-06 RX ORDER — INSULIN GLARGINE 100 [IU]/ML
10 INJECTION, SOLUTION SUBCUTANEOUS EVERY EVENING
Status: DISCONTINUED | OUTPATIENT
Start: 2019-11-06 | End: 2019-11-08 | Stop reason: HOSPADM

## 2019-11-06 RX ORDER — ASPIRIN 81 MG/1
81 TABLET, CHEWABLE ORAL DAILY
Status: DISCONTINUED | OUTPATIENT
Start: 2019-11-06 | End: 2019-11-08 | Stop reason: HOSPADM

## 2019-11-06 RX ORDER — FERROUS SULFATE 325(65) MG
325 TABLET ORAL 3 TIMES DAILY
COMMUNITY

## 2019-11-06 RX ORDER — ONDANSETRON 4 MG/1
4 TABLET, ORALLY DISINTEGRATING ORAL EVERY 4 HOURS PRN
Status: DISCONTINUED | OUTPATIENT
Start: 2019-11-06 | End: 2019-11-08 | Stop reason: HOSPADM

## 2019-11-06 RX ADMIN — FERROUS SULFATE TAB 325 MG (65 MG ELEMENTAL FE) 325 MG: 325 (65 FE) TAB at 07:08

## 2019-11-06 RX ADMIN — INSULIN GLARGINE 10 UNITS: 100 INJECTION, SOLUTION SUBCUTANEOUS at 18:39

## 2019-11-06 RX ADMIN — LISINOPRIL 10 MG: 10 TABLET ORAL at 04:39

## 2019-11-06 RX ADMIN — LABETALOL HYDROCHLORIDE 10 MG: 5 INJECTION INTRAVENOUS at 16:57

## 2019-11-06 RX ADMIN — CARVEDILOL 25 MG: 6.25 TABLET, FILM COATED ORAL at 16:56

## 2019-11-06 RX ADMIN — ASPIRIN 81 MG 81 MG: 81 TABLET ORAL at 07:08

## 2019-11-06 RX ADMIN — NITROGLYCERIN 0.4 MG: 0.4 TABLET, ORALLY DISINTEGRATING SUBLINGUAL at 01:13

## 2019-11-06 RX ADMIN — HYDRALAZINE HYDROCHLORIDE 20 MG: 20 INJECTION INTRAMUSCULAR; INTRAVENOUS at 19:15

## 2019-11-06 RX ADMIN — ATORVASTATIN CALCIUM 40 MG: 40 TABLET, FILM COATED ORAL at 18:39

## 2019-11-06 RX ADMIN — SENNOSIDES AND DOCUSATE SODIUM 2 TABLET: 8.6; 5 TABLET ORAL at 07:08

## 2019-11-06 RX ADMIN — OMEPRAZOLE 20 MG: 20 CAPSULE, DELAYED RELEASE ORAL at 16:13

## 2019-11-06 RX ADMIN — HEPARIN SODIUM 5000 UNITS: 5000 INJECTION, SOLUTION INTRAVENOUS; SUBCUTANEOUS at 07:08

## 2019-11-06 RX ADMIN — CARVEDILOL 25 MG: 6.25 TABLET, FILM COATED ORAL at 05:17

## 2019-11-06 RX ADMIN — HEPARIN SODIUM 5000 UNITS: 5000 INJECTION, SOLUTION INTRAVENOUS; SUBCUTANEOUS at 18:39

## 2019-11-06 RX ADMIN — NITROGLYCERIN 0.4 MG: 0.4 TABLET, ORALLY DISINTEGRATING SUBLINGUAL at 01:43

## 2019-11-06 RX ADMIN — LEVOTHYROXINE SODIUM 112 MCG: 112 TABLET ORAL at 07:09

## 2019-11-06 RX ADMIN — NITROGLYCERIN 0.5 INCH: 20 OINTMENT TOPICAL at 02:22

## 2019-11-06 RX ADMIN — LABETALOL HYDROCHLORIDE 10 MG: 5 INJECTION INTRAVENOUS at 04:38

## 2019-11-06 RX ADMIN — FERROUS SULFATE TAB 325 MG (65 MG ELEMENTAL FE) 325 MG: 325 (65 FE) TAB at 16:56

## 2019-11-06 RX ADMIN — ACETAMINOPHEN 650 MG: 325 TABLET, FILM COATED ORAL at 16:15

## 2019-11-06 ASSESSMENT — ENCOUNTER SYMPTOMS
SHORTNESS OF BREATH: 1
FOCAL WEAKNESS: 0
NERVOUS/ANXIOUS: 1
DIZZINESS: 1
LOSS OF CONSCIOUSNESS: 0
SHORTNESS OF BREATH: 0
FEVER: 0
DIZZINESS: 0
SORE THROAT: 0
NAUSEA: 1
EYE PAIN: 0
MYALGIAS: 1
BLURRED VISION: 0
FALLS: 0
BLOOD IN STOOL: 0
PALPITATIONS: 0
PHOTOPHOBIA: 0
PND: 1
VOMITING: 0
CONSTIPATION: 0
DEPRESSION: 0
COUGH: 0
CHILLS: 0
SPUTUM PRODUCTION: 0
BACK PAIN: 0
BRUISES/BLEEDS EASILY: 0
ABDOMINAL PAIN: 0
NAUSEA: 0
NECK PAIN: 0
DOUBLE VISION: 0
DIARRHEA: 0
HEADACHES: 0

## 2019-11-06 ASSESSMENT — LIFESTYLE VARIABLES
HOW MANY TIMES IN THE PAST YEAR HAVE YOU HAD 5 OR MORE DRINKS IN A DAY: 0
AVERAGE NUMBER OF DAYS PER WEEK YOU HAVE A DRINK CONTAINING ALCOHOL: 0
DO YOU DRINK ALCOHOL: NO
HAVE YOU EVER FELT YOU SHOULD CUT DOWN ON YOUR DRINKING: NO
ALCOHOL_USE: NO
HAVE PEOPLE ANNOYED YOU BY CRITICIZING YOUR DRINKING: NO
ON A TYPICAL DAY WHEN YOU DRINK ALCOHOL HOW MANY DRINKS DO YOU HAVE: 0
EVER HAD A DRINK FIRST THING IN THE MORNING TO STEADY YOUR NERVES TO GET RID OF A HANGOVER: NO
TOTAL SCORE: 0
EVER FELT BAD OR GUILTY ABOUT YOUR DRINKING: NO
EVER_SMOKED: NEVER
TOTAL SCORE: 0
TOTAL SCORE: 0
CONSUMPTION TOTAL: NEGATIVE

## 2019-11-06 ASSESSMENT — PATIENT HEALTH QUESTIONNAIRE - PHQ9
1. LITTLE INTEREST OR PLEASURE IN DOING THINGS: NOT AT ALL
SUM OF ALL RESPONSES TO PHQ9 QUESTIONS 1 AND 2: 0
1. LITTLE INTEREST OR PLEASURE IN DOING THINGS: NOT AT ALL
2. FEELING DOWN, DEPRESSED, IRRITABLE, OR HOPELESS: NOT AT ALL
2. FEELING DOWN, DEPRESSED, IRRITABLE, OR HOPELESS: NOT AT ALL
SUM OF ALL RESPONSES TO PHQ9 QUESTIONS 1 AND 2: 0

## 2019-11-06 NOTE — ASSESSMENT & PLAN NOTE
Secondary to diabetes. Renal biopsy about a year ago showed diabetic nephropathy. Follows with Dr. Yung outpatient. Patient discussed with Dr. Geronimo, on call for nephrology. He states that the patient's baseline Cr is around 2.6. They have made recommendations for blood pressure control which have been implemented below.     Plan:  -Patient at about baseline Cr with mild bump due to starting blood pressure medications  -Continue lisinopril to 40mg QD  -Continue torsemide 20mg BID   -Patient will followup outpatient with her nephrologist for her already scheduled appointment on 11/19

## 2019-11-06 NOTE — ASSESSMENT & PLAN NOTE
A1c today is 5.5. She usues 10 units of Lantus  and Januvia 50mg at home.    Plan:  -Continue Lantus 10u QHS  -Held Januvia 50 mg inpatient, to resume outpatient

## 2019-11-06 NOTE — ASSESSMENT & PLAN NOTE
Unclear etiology. Most likely esophageal spasm as she improved with nitro. GERD is likely contributing so she was started on a PPI.     Plan:  -Patient passed SLP evaluation without signs of dysphagia   -GI cocktail as needed for further episodes of pain   -Added PPI

## 2019-11-06 NOTE — ASSESSMENT & PLAN NOTE
Admitted with complaints of 3 days of chest pain, not consistent with angina. Workup for ACS was negative. Echo normal, troponin trend was negative, EKGs were nonischemic and she has been stable on telemetry. Negative stress test in 2/2019. Patient's chest pain has now resolved. Suspect GERD is the cause of her chest pain.     Plan:  -GI cocktail as needed for another episode of chest pain   -Passed SLP evaluation, can consider barium swallow outpatient  -Omeprazole 20mg QD

## 2019-11-06 NOTE — ED NOTES
Assist RN: Nitro patch placed per orders to R chest wall.    Vitals:    11/06/19 0216   BP: (!) 162/63   Pulse: 69   Resp: 18   Temp:    SpO2: 96%
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
## 2019-11-06 NOTE — PROGRESS NOTES
Assessment completed. Pt A&Ox4. Persian speaking, able to communicate with this RN. Respirations are even and unlabored on RA. Pt denies pain at this time. Reports being very tired. Sleeping on /off while talking to this RN. Monitors applied, VS stable (BP controlled), call light and belongings within reach. POC updated (stress test, barium swallow, labs, BP control). Pt educated on room and call light, pt verbalized understanding. Communication board updated. Needs met.  at bedside.

## 2019-11-06 NOTE — PROGRESS NOTES
Internal Medicine Interval Note  Note Author: Mary Ibarra M.D.     Name Sandra Austin 1950   Age/Sex 69 y.o. female   MRN [id number]   Code Status FULL     After 5PM or if no immediate response to page, please call for cross-coverage  Attending/Team: Dr. Salmeron - Red Team See Patient List for primary contact information  Call [phone] to page    1st Call - Day Intern (R1):   Dr. Ibarra 2nd Call - Day Sr. Resident (R2/R3):   Dr. Rodriguez     Reason for interval visit   Chest Pain  Hypertensive Urgency    Interval Problem Daily Status Update  -Patient admitted last night, made it up to the floor early AM. Continued hypertension on the floor that has improved after 1 dose of IV labetalol.   -Echocardiogram returned normal, troponin trends have been negative and serial EKGs have been nonischemic. Patient's chest pain has resolved.   -Continued complaints of dysphagia, SLP has been consulted and evaluation is pending at this time     Review of Systems   Constitutional: Negative for chills and fever.   HENT: Negative for ear pain and tinnitus.    Eyes: Negative for photophobia and pain.   Respiratory: Negative for cough, sputum production and shortness of breath.    Cardiovascular: Positive for chest pain. Negative for palpitations and leg swelling.   Gastrointestinal: Negative for abdominal pain, blood in stool, constipation, diarrhea, melena, nausea and vomiting.   Genitourinary: Negative for dysuria and urgency.   Musculoskeletal: Negative for back pain and neck pain.   Skin: Negative for rash.   Neurological: Negative for dizziness, loss of consciousness and headaches.   Endo/Heme/Allergies: Negative for environmental allergies. Does not bruise/bleed easily.   Psychiatric/Behavioral: Negative for depression and suicidal ideas.   All other systems reviewed and are negative.    Disposition/Barriers to discharge:   Continued inpatient evaluation and treatment at this  "time    Consultants/Specialty  None  PCP: ESTEBAN Cortez    Quality Measures  Quality-Core Measures   Glasgow catheter::  No Glasgow  DVT prophylaxis pharmacological::  Heparin  Ulcer Prophylaxis::  No      Physical Exam       Vitals:    11/06/19 0357 11/06/19 0437 11/06/19 0519 11/06/19 0603   BP: (!) 208/91 (!) 201/88 (!) 202/82 (!) 167/74   Pulse: 73  75 65   Resp: 19  16    Temp: 36.3 °C (97.4 °F)      TempSrc: Temporal      SpO2: 96%  97%    Weight: 60.2 kg (132 lb 11.5 oz)      Height: 1.575 m (5' 2\")        Body mass index is 24.27 kg/m². Weight: 60.2 kg (132 lb 11.5 oz)  Oxygen Therapy:  Pulse Oximetry: 97 %, O2 (LPM): 0, O2 Delivery: None (Room Air)    Physical Exam   Constitutional: She is oriented to person, place, and time and well-developed, well-nourished, and in no distress. No distress.   HENT:   Head: Normocephalic and atraumatic.   Mouth/Throat: Oropharynx is clear and moist. No oropharyngeal exudate.   Eyes: Right eye exhibits no discharge. Left eye exhibits no discharge. No scleral icterus.   Neck: No JVD present. No tracheal deviation present.   Cardiovascular: Normal rate and regular rhythm.   No murmur heard.  Pulmonary/Chest: Effort normal and breath sounds normal. No respiratory distress. She has no wheezes. She has no rales. She exhibits tenderness.   Complains of chest pain when chest is pressed upon. No particular pattern.    Abdominal: Soft. She exhibits no distension. There is no tenderness.   Musculoskeletal:         General: No edema.   Lymphadenopathy:     She has no cervical adenopathy.   Neurological: She is alert and oriented to person, place, and time. No cranial nerve deficit.   Skin: Skin is warm and dry. No rash noted.   Psychiatric: Affect normal.   Nursing note and vitals reviewed.      Assessment/Plan     * Chest pain- (present on admission)  Assessment & Plan  Complaints of 3 days of chest pain, not consistent with angina. Workup for ACS has been negative. Echo is " normal, troponin trend was negative, EKGs have been nonischemic and she has been stable on telemetry. Negative stress test in 2/2019. Patient's chest pain has now resolved. Suspect GERD is the cause of her chest pain.     Plan:  -GI cocktail as needed for another episode of chest pain   -SLP evaluation pending given her history of dysphagia     Dysphagia- (present on admission)  Assessment & Plan  Unclear etiology. ?esophageal spasm as she improved with nitro? GERD may be contributing.     Plan:  -SLP evaluation  -GI cocktail as needed to see if this resolves her symptoms   -Will consider adding a PPI    CKD stage 4 due to type 2 diabetes mellitus (HCC)- (present on admission)  Assessment & Plan  Secondary to diabetes.Renal biopsy about a year ago showed diabetic nephropathy.    Plan:  -Follows Dr. Yung as an outpatient.  -On ACE inhibitor.  -Renally dose medications/avoid nephrotoxins    Essential hypertension- (present on admission)  Assessment & Plan  Came in with hypertensive urgency, which responded to one dose of IV labetalol. She possibly is taking the wrong dose.     Plan:  -Continue home carvediol, lisinopril  -Will increase lisinopril to 20mg tomorrow if she continues to be hypertensive    Dyslipidemia- (present on admission)  Assessment & Plan  On pravastatin as an outpatient. ACS is ruled out so we will continue her home dose.     Hypothyroidism- (present on admission)  Assessment & Plan  Resume levothyroxine 112 mcg.    Type 2 diabetes mellitus, with long-term current use of insulin ([facility])- (present on admission)  Assessment & Plan  Last A1c was 10.5 in 2/2019. She usues 10 units of Lantus  and Januvia 50mg at home.    Plan:  -Continue Lantus 10u QHS  -Hold Januvia 50 mg while inpatient  -Placed on correctional scale insulin while in hospital.    Mary Ibarra MD

## 2019-11-06 NOTE — ASSESSMENT & PLAN NOTE
Came in with hypertensive urgency, which has responded to PRN labetalol and hydralazine. Discussion with Dr. Geronimo, on call for nephrology. He has made recommendations for blood pressure control which have helped to control her pressures better.     Plan:  -Continue carvediol  -Continue lisinopril to 40mg QD per nephrology recommendations  -Continue torsemide 20mg BID per nephrology recommendations  -Patient will followup outpatient with her nephrologist for her already scheduled appointment on 11/19
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
## 2019-11-06 NOTE — PROGRESS NOTES
Luanne from [facility] Paged- to report pt has had a stress test in February 2019. Will inquire this AM if second one within a year is needed.

## 2019-11-07 PROBLEM — I16.0 HYPERTENSIVE URGENCY: Status: ACTIVE | Noted: 2018-04-20

## 2019-11-07 LAB
ALBUMIN SERPL BCP-MCNC: 2.8 G/DL (ref 3.2–4.9)
ALBUMIN/GLOB SERPL: 0.9 G/DL
ALP SERPL-CCNC: 117 U/L (ref 30–99)
ALT SERPL-CCNC: 8 U/L (ref 2–50)
ANION GAP SERPL CALC-SCNC: 5 MMOL/L (ref 0–11.9)
AST SERPL-CCNC: 11 U/L (ref 12–45)
BILIRUB SERPL-MCNC: 0.3 MG/DL (ref 0.1–1.5)
BUN SERPL-MCNC: 35 MG/DL (ref 8–22)
CALCIUM SERPL-MCNC: 8.4 MG/DL (ref 8.5–10.5)
CHLORIDE SERPL-SCNC: 113 MMOL/L (ref 96–112)
CHOLEST SERPL-MCNC: 135 MG/DL (ref 100–199)
CO2 SERPL-SCNC: 19 MMOL/L (ref 20–33)
CREAT SERPL-MCNC: 2.68 MG/DL (ref 0.5–1.4)
ERYTHROCYTE [DISTWIDTH] IN BLOOD BY AUTOMATED COUNT: 51.7 FL (ref 35.9–50)
GLOBULIN SER CALC-MCNC: 3.1 G/DL (ref 1.9–3.5)
GLUCOSE BLD-MCNC: 103 MG/DL (ref 65–99)
GLUCOSE BLD-MCNC: 112 MG/DL (ref 65–99)
GLUCOSE BLD-MCNC: 134 MG/DL (ref 65–99)
GLUCOSE BLD-MCNC: 158 MG/DL (ref 65–99)
GLUCOSE SERPL-MCNC: 111 MG/DL (ref 65–99)
HCT VFR BLD AUTO: 23.8 % (ref 37–47)
HDLC SERPL-MCNC: 23 MG/DL
HGB BLD-MCNC: 7.9 G/DL (ref 12–16)
LDLC SERPL CALC-MCNC: 54 MG/DL
MAGNESIUM SERPL-MCNC: 2.2 MG/DL (ref 1.5–2.5)
MCH RBC QN AUTO: 33.2 PG (ref 27–33)
MCHC RBC AUTO-ENTMCNC: 33.2 G/DL (ref 33.6–35)
MCV RBC AUTO: 100 FL (ref 81.4–97.8)
PLATELET # BLD AUTO: 151 K/UL (ref 164–446)
PMV BLD AUTO: 10 FL (ref 9–12.9)
POTASSIUM SERPL-SCNC: 4.9 MMOL/L (ref 3.6–5.5)
PROT SERPL-MCNC: 5.9 G/DL (ref 6–8.2)
RBC # BLD AUTO: 2.38 M/UL (ref 4.2–5.4)
SODIUM SERPL-SCNC: 137 MMOL/L (ref 135–145)
TRIGL SERPL-MCNC: 291 MG/DL (ref 0–149)
WBC # BLD AUTO: 5.5 K/UL (ref 4.8–10.8)

## 2019-11-07 PROCEDURE — 80053 COMPREHEN METABOLIC PANEL: CPT

## 2019-11-07 PROCEDURE — G0378 HOSPITAL OBSERVATION PER HR: HCPCS

## 2019-11-07 PROCEDURE — 80061 LIPID PANEL: CPT

## 2019-11-07 PROCEDURE — 83735 ASSAY OF MAGNESIUM: CPT

## 2019-11-07 PROCEDURE — 700111 HCHG RX REV CODE 636 W/ 250 OVERRIDE (IP): Performed by: STUDENT IN AN ORGANIZED HEALTH CARE EDUCATION/TRAINING PROGRAM

## 2019-11-07 PROCEDURE — 700102 HCHG RX REV CODE 250 W/ 637 OVERRIDE(OP): Performed by: INTERNAL MEDICINE

## 2019-11-07 PROCEDURE — 700111 HCHG RX REV CODE 636 W/ 250 OVERRIDE (IP): Performed by: INTERNAL MEDICINE

## 2019-11-07 PROCEDURE — A9270 NON-COVERED ITEM OR SERVICE: HCPCS | Performed by: INTERNAL MEDICINE

## 2019-11-07 PROCEDURE — 82962 GLUCOSE BLOOD TEST: CPT

## 2019-11-07 PROCEDURE — 99226 PR SUBSEQUENT OBSERVATION CARE,LEVEL III: CPT | Mod: GC | Performed by: HOSPITALIST

## 2019-11-07 PROCEDURE — A9270 NON-COVERED ITEM OR SERVICE: HCPCS | Performed by: STUDENT IN AN ORGANIZED HEALTH CARE EDUCATION/TRAINING PROGRAM

## 2019-11-07 PROCEDURE — 85027 COMPLETE CBC AUTOMATED: CPT

## 2019-11-07 PROCEDURE — 700102 HCHG RX REV CODE 250 W/ 637 OVERRIDE(OP): Performed by: STUDENT IN AN ORGANIZED HEALTH CARE EDUCATION/TRAINING PROGRAM

## 2019-11-07 PROCEDURE — 36415 COLL VENOUS BLD VENIPUNCTURE: CPT

## 2019-11-07 PROCEDURE — 96376 TX/PRO/DX INJ SAME DRUG ADON: CPT

## 2019-11-07 PROCEDURE — 96372 THER/PROPH/DIAG INJ SC/IM: CPT

## 2019-11-07 RX ORDER — LISINOPRIL 20 MG/1
20 TABLET ORAL ONCE
Status: COMPLETED | OUTPATIENT
Start: 2019-11-07 | End: 2019-11-07

## 2019-11-07 RX ORDER — HYDRALAZINE HYDROCHLORIDE 20 MG/ML
20 INJECTION INTRAMUSCULAR; INTRAVENOUS ONCE
Status: COMPLETED | OUTPATIENT
Start: 2019-11-07 | End: 2019-11-07

## 2019-11-07 RX ORDER — LISINOPRIL 20 MG/1
40 TABLET ORAL
Status: DISCONTINUED | OUTPATIENT
Start: 2019-11-08 | End: 2019-11-08 | Stop reason: HOSPADM

## 2019-11-07 RX ORDER — TORSEMIDE 20 MG/1
20 TABLET ORAL 2 TIMES DAILY
Status: DISCONTINUED | OUTPATIENT
Start: 2019-11-07 | End: 2019-11-08 | Stop reason: HOSPADM

## 2019-11-07 RX ORDER — HYDRALAZINE HYDROCHLORIDE 20 MG/ML
20 INJECTION INTRAMUSCULAR; INTRAVENOUS EVERY 6 HOURS PRN
Status: DISCONTINUED | OUTPATIENT
Start: 2019-11-07 | End: 2019-11-08 | Stop reason: HOSPADM

## 2019-11-07 RX ORDER — OMEPRAZOLE 20 MG/1
20 CAPSULE, DELAYED RELEASE ORAL DAILY
Qty: 30 CAP | Refills: 0
Start: 2019-11-08 | End: 2019-11-08

## 2019-11-07 RX ORDER — LISINOPRIL 40 MG/1
40 TABLET ORAL DAILY
Qty: 30 TAB | Refills: 0
Start: 2019-11-08 | End: 2019-11-08

## 2019-11-07 RX ORDER — TORSEMIDE 20 MG/1
20 TABLET ORAL 2 TIMES DAILY
Qty: 30 TAB | Refills: 3
Start: 2019-11-07 | End: 2019-11-08

## 2019-11-07 RX ADMIN — INSULIN GLARGINE 10 UNITS: 100 INJECTION, SOLUTION SUBCUTANEOUS at 18:37

## 2019-11-07 RX ADMIN — OMEPRAZOLE 20 MG: 20 CAPSULE, DELAYED RELEASE ORAL at 05:17

## 2019-11-07 RX ADMIN — TORSEMIDE 20 MG: 20 TABLET ORAL at 17:52

## 2019-11-07 RX ADMIN — HYDRALAZINE HYDROCHLORIDE 20 MG: 20 INJECTION INTRAMUSCULAR; INTRAVENOUS at 13:30

## 2019-11-07 RX ADMIN — CARVEDILOL 25 MG: 6.25 TABLET, FILM COATED ORAL at 17:52

## 2019-11-07 RX ADMIN — LABETALOL HYDROCHLORIDE 10 MG: 5 INJECTION INTRAVENOUS at 08:26

## 2019-11-07 RX ADMIN — HYDRALAZINE HYDROCHLORIDE 20 MG: 20 INJECTION INTRAMUSCULAR; INTRAVENOUS at 18:43

## 2019-11-07 RX ADMIN — FERROUS SULFATE TAB 325 MG (65 MG ELEMENTAL FE) 325 MG: 325 (65 FE) TAB at 17:52

## 2019-11-07 RX ADMIN — LEVOTHYROXINE SODIUM 112 MCG: 112 TABLET ORAL at 05:18

## 2019-11-07 RX ADMIN — LISINOPRIL 20 MG: 20 TABLET ORAL at 11:56

## 2019-11-07 RX ADMIN — HEPARIN SODIUM 5000 UNITS: 5000 INJECTION, SOLUTION INTRAVENOUS; SUBCUTANEOUS at 05:18

## 2019-11-07 RX ADMIN — CARVEDILOL 25 MG: 6.25 TABLET, FILM COATED ORAL at 08:26

## 2019-11-07 RX ADMIN — FERROUS SULFATE TAB 325 MG (65 MG ELEMENTAL FE) 325 MG: 325 (65 FE) TAB at 11:48

## 2019-11-07 RX ADMIN — ACETAMINOPHEN 650 MG: 325 TABLET, FILM COATED ORAL at 01:46

## 2019-11-07 RX ADMIN — ASPIRIN 81 MG 81 MG: 81 TABLET ORAL at 05:17

## 2019-11-07 RX ADMIN — INSULIN LISPRO 1 UNITS: 100 INJECTION, SOLUTION INTRAVENOUS; SUBCUTANEOUS at 18:37

## 2019-11-07 RX ADMIN — TORSEMIDE 20 MG: 20 TABLET ORAL at 11:56

## 2019-11-07 RX ADMIN — ATORVASTATIN CALCIUM 40 MG: 40 TABLET, FILM COATED ORAL at 17:52

## 2019-11-07 RX ADMIN — LISINOPRIL 20 MG: 20 TABLET ORAL at 05:17

## 2019-11-07 ASSESSMENT — ENCOUNTER SYMPTOMS
BLOOD IN STOOL: 0
DEPRESSION: 0
CONSTIPATION: 0
PALPITATIONS: 0
DIZZINESS: 0
EYE PAIN: 0
LOSS OF CONSCIOUSNESS: 0
ABDOMINAL PAIN: 0
NAUSEA: 0
BRUISES/BLEEDS EASILY: 0
FEVER: 0
COUGH: 0
HEADACHES: 0
PHOTOPHOBIA: 0
DIARRHEA: 0
BACK PAIN: 0
VOMITING: 0
CHILLS: 0
SPUTUM PRODUCTION: 0
NECK PAIN: 0
SHORTNESS OF BREATH: 0

## 2019-11-07 ASSESSMENT — PATIENT HEALTH QUESTIONNAIRE - PHQ9
SUM OF ALL RESPONSES TO PHQ9 QUESTIONS 1 AND 2: 0
2. FEELING DOWN, DEPRESSED, IRRITABLE, OR HOPELESS: NOT AT ALL
1. LITTLE INTEREST OR PLEASURE IN DOING THINGS: NOT AT ALL

## 2019-11-07 NOTE — PROGRESS NOTES
Report received from GALI Salvador.  Patient sitting up in bed eating her dinner with family at the bedside.   Patient A & O  X 4.  No apparent signs of distress.  POC gone over with pt and family.  No questions at this time.  Safety precautions in place.  Patient educated to call for assistance.  Will continue to monitor.

## 2019-11-07 NOTE — CARE PLAN
Problem: Safety  Goal: Will remain free from falls  Outcome: PROGRESSING AS EXPECTED  Note:   Pt educated on safety precautions, utilization of the call light, and bed alarm.  Pt verbalized understanding.      Problem: Infection  Goal: Will remain free from infection  Outcome: PROGRESSING AS EXPECTED  Note:   Implement standard precautions and perform handwashing before and after patient contact. RN will follow protocols and necessary steps to minimize the spread of infection.  RN educated pt and any visitors on proper hand hygiene.

## 2019-11-07 NOTE — PROGRESS NOTES
Internal Medicine Interval Note  Note Author: Mary Ibarra M.D.     Name Sandra Austin 1950   Age/Sex 69 y.o. female   MRN [id number]   Code Status FULL     After 5PM or if no immediate response to page, please call for cross-coverage  Attending/Team: Dr. Salmeron - Red Team See Patient List for primary contact information  Call [phone] to page    1st Call - Day Intern (R1):   Dr. Ibarra 2nd Call - Day Sr. Resident (R2/R3):   Dr. Rodriguez     Reason for interval visit    Chest Pain  Hypertensive Urgency    Interval Problem Daily Status Update    -Patient stable overnight but with continued hypertension. She responded to one dose of hydralazine, but then her blood pressure trended up again.   -Nephrology states the patient is at her baseline Cr of 2.6. For blood pressure control they recommend torsemide 20mg BID, increasing lisinopril to 40mg and hydralazine PRN. These changes have been added.   -Chest pain has resolved without further episodes. ACS workup was negative.   -Passed SLP swallow evaluation.   -Dysphagia is thought to be due to esophageal spasm from GERD. She has been started on a PPI.    -She will be staying overnight to control her blood pressure     Review of Systems   Constitutional: Negative for chills and fever.   HENT: Negative for ear pain and tinnitus.    Eyes: Negative for photophobia and pain.   Respiratory: Negative for cough, sputum production and shortness of breath.    Cardiovascular: Negative for chest pain, palpitations and leg swelling.   Gastrointestinal: Negative for abdominal pain, blood in stool, constipation, diarrhea, melena, nausea and vomiting.   Genitourinary: Negative for dysuria and urgency.   Musculoskeletal: Negative for back pain and neck pain.   Skin: Negative for itching and rash.   Neurological: Negative for dizziness, loss of consciousness and headaches.   Endo/Heme/Allergies: Negative for environmental allergies. Does not bruise/bleed  easily.   Psychiatric/Behavioral: Negative for depression and suicidal ideas.   All other systems reviewed and are negative.    Disposition/Barriers to discharge:   None    Consultants/Specialty  None  PCP: ESTEBAN Cotrez    Quality Measures  Quality-Core Measures   Glasgow catheter::  No Glasgow  DVT prophylaxis pharmacological::  Heparin  Ulcer Prophylaxis::  Yes    Physical Exam       Vitals:    11/06/19 1835 11/06/19 1940 11/07/19 0050 11/07/19 0411   BP: (!) 212/95 135/64 150/70 (!) 164/77   Pulse: 69 90 82 79   Resp:  18 17 16   Temp:  36.6 °C (97.8 °F) 36.7 °C (98.1 °F) 37.1 °C (98.7 °F)   TempSrc:  Temporal Temporal Temporal   SpO2:   96% 97%   Weight:       Height:         Body mass index is 24.27 kg/m².    Oxygen Therapy:  Pulse Oximetry: 97 %, O2 (LPM): 0, O2 Delivery: None (Room Air)    Physical Exam   Constitutional: She is oriented to person, place, and time and well-developed, well-nourished, and in no distress. No distress.   HENT:   Head: Normocephalic and atraumatic.   Chronic changes to the left eye   Eyes: Right eye exhibits no discharge. Left eye exhibits no discharge. No scleral icterus.   Neck: No JVD present. No tracheal deviation present.   Cardiovascular: Normal rate and regular rhythm.   No murmur heard.  Pulmonary/Chest: Effort normal and breath sounds normal. No respiratory distress. She has no wheezes. She has no rales.   Abdominal: Soft. She exhibits no distension. There is no tenderness.   Musculoskeletal:         General: No edema.   Lymphadenopathy:     She has no cervical adenopathy.   Neurological: She is alert and oriented to person, place, and time. No cranial nerve deficit.   Skin: Skin is warm and dry. No rash noted.   Psychiatric: Affect and judgment normal.       Assessment/Plan     * Chest pain- (present on admission)  Assessment & Plan  Admitted with complaints of 3 days of chest pain, not consistent with angina. Workup for ACS was negative. Echo normal, troponin  trend was negative, EKGs were nonischemic and she has been stable on telemetry. Negative stress test in 2/2019. Patient's chest pain has now resolved. Suspect GERD is the cause of her chest pain.     Plan:  -GI cocktail as needed for another episode of chest pain   -Passed SLP evaluation, can consider barium swallow outpatient  -Omeprazole 20mg QD    Dysphagia- (present on admission)  Assessment & Plan  Unclear etiology. Most likely esophageal spasm as she improved with nitro. GERD is likely contributing so she was started on a PPI.     Plan:  -Patient passed SLP evaluation without signs of dysphagia   -GI cocktail as needed for further episodes of pain   -Added PPI     CKD stage 4 due to type 2 diabetes mellitus (HCC)- (present on admission)  Assessment & Plan  Secondary to diabetes. Renal biopsy about a year ago showed diabetic nephropathy. Follows with Dr. Yung outpatient. Discussion with Dr. Geronimo, on call for nephrology, today. He states that the patient's baseline Cr is around 2.6, which is where her labs are at today. They have made recommendations for blood pressure control which have been implemented below.     Plan:  -Patient at baseline Cr, will continue to monitor labs   -Increasing lisinopril to 40mg QD per nephrology recommendations  -Starting torsemide 20mg BID for blood pressure control per nephrology recommendations.   -Patient will followup outpatient with her nephrologist for her already scheduled appointment on 11/19     Hypertensive urgency- (present on admission)  Assessment & Plan  Came in with hypertensive urgency, which has responded to PRN labetalol and hydralazine. Discussion with Dr. Geronimo, on call for nephrology, today. He has made recommendations for blood pressure control which has been implemented below.     Plan:  -Continue home carvediol  -Increasing lisinopril to 40mg QD per nephrology recommendations  -Starting torsemide 20mg BID per nephrology recommendations  -PRN  hydralazine to keep blood pressure <160/90  -Patient will followup outpatient with her nephrologist for her already scheduled appointment on 11/19     Dyslipidemia- (present on admission)  Assessment & Plan  On pravastatin as an outpatient. ACS is ruled out so we will continue her home dose.     Hypothyroidism- (present on admission)  Assessment & Plan  Resume levothyroxine 112 mcg.    Type 2 diabetes mellitus, with long-term current use of insulin (HCC)- (present on admission)  Assessment & Plan  A1c today is 5.5. She usues 10 units of Lantus  and Januvia 50mg at home.    Plan:  -Continue Lantus 10u QHS  -Hold Januvia 50 mg while inpatient  -Placed on correctional scale insulin while in hospital.    Mary Ibarra MD

## 2019-11-08 VITALS
TEMPERATURE: 98.9 F | BODY MASS INDEX: 24.42 KG/M2 | SYSTOLIC BLOOD PRESSURE: 134 MMHG | DIASTOLIC BLOOD PRESSURE: 63 MMHG | RESPIRATION RATE: 15 BRPM | HEIGHT: 62 IN | OXYGEN SATURATION: 94 % | HEART RATE: 75 BPM | WEIGHT: 132.72 LBS

## 2019-11-08 PROBLEM — R07.9 CHEST PAIN: Status: RESOLVED | Noted: 2019-02-24 | Resolved: 2019-11-08

## 2019-11-08 PROBLEM — I16.0 HYPERTENSIVE URGENCY: Status: RESOLVED | Noted: 2018-04-20 | Resolved: 2019-11-08

## 2019-11-08 PROBLEM — D64.9 ANEMIA: Status: ACTIVE | Noted: 2019-11-08

## 2019-11-08 PROBLEM — Z98.890 STATUS POST BIOPSY OF KIDNEY: Status: RESOLVED | Noted: 2018-08-31 | Resolved: 2019-11-08

## 2019-11-08 PROBLEM — R13.10 DYSPHAGIA: Status: RESOLVED | Noted: 2019-11-06 | Resolved: 2019-11-08

## 2019-11-08 PROBLEM — K68.3 RETROPERITONEAL HEMATOMA: Status: RESOLVED | Noted: 2018-08-31 | Resolved: 2019-11-08

## 2019-11-08 PROBLEM — M10.9 GOUT OF FOOT: Status: RESOLVED | Noted: 2018-04-20 | Resolved: 2019-11-08

## 2019-11-08 LAB
ALBUMIN SERPL BCP-MCNC: 2.8 G/DL (ref 3.2–4.9)
ALBUMIN/GLOB SERPL: 1 G/DL
ALP SERPL-CCNC: 123 U/L (ref 30–99)
ALT SERPL-CCNC: 7 U/L (ref 2–50)
ANION GAP SERPL CALC-SCNC: 7 MMOL/L (ref 0–11.9)
AST SERPL-CCNC: 10 U/L (ref 12–45)
BASOPHILS # BLD AUTO: 0.2 % (ref 0–1.8)
BASOPHILS # BLD: 0.01 K/UL (ref 0–0.12)
BILIRUB SERPL-MCNC: 0.3 MG/DL (ref 0.1–1.5)
BUN SERPL-MCNC: 41 MG/DL (ref 8–22)
CALCIUM SERPL-MCNC: 8.4 MG/DL (ref 8.5–10.5)
CHLORIDE SERPL-SCNC: 110 MMOL/L (ref 96–112)
CO2 SERPL-SCNC: 19 MMOL/L (ref 20–33)
CREAT SERPL-MCNC: 2.95 MG/DL (ref 0.5–1.4)
EOSINOPHIL # BLD AUTO: 0.07 K/UL (ref 0–0.51)
EOSINOPHIL NFR BLD: 1.3 % (ref 0–6.9)
ERYTHROCYTE [DISTWIDTH] IN BLOOD BY AUTOMATED COUNT: 50.1 FL (ref 35.9–50)
FERRITIN SERPL-MCNC: 314.1 NG/ML (ref 10–291)
FOLATE SERPL-MCNC: 13.4 NG/ML
GLOBULIN SER CALC-MCNC: 2.9 G/DL (ref 1.9–3.5)
GLUCOSE BLD-MCNC: 104 MG/DL (ref 65–99)
GLUCOSE BLD-MCNC: 144 MG/DL (ref 65–99)
GLUCOSE BLD-MCNC: 148 MG/DL (ref 65–99)
GLUCOSE SERPL-MCNC: 126 MG/DL (ref 65–99)
HCT VFR BLD AUTO: 22.9 % (ref 37–47)
HEMOCCULT STL QL: NEGATIVE
HGB BLD-MCNC: 7.6 G/DL (ref 12–16)
IMM GRANULOCYTES # BLD AUTO: 0.02 K/UL (ref 0–0.11)
IMM GRANULOCYTES NFR BLD AUTO: 0.4 % (ref 0–0.9)
IRON SATN MFR SERPL: 38 % (ref 15–55)
IRON SERPL-MCNC: 74 UG/DL (ref 40–170)
LYMPHOCYTES # BLD AUTO: 1.6 K/UL (ref 1–4.8)
LYMPHOCYTES NFR BLD: 29.4 % (ref 22–41)
MCH RBC QN AUTO: 33 PG (ref 27–33)
MCHC RBC AUTO-ENTMCNC: 33.2 G/DL (ref 33.6–35)
MCV RBC AUTO: 99.6 FL (ref 81.4–97.8)
MONOCYTES # BLD AUTO: 0.38 K/UL (ref 0–0.85)
MONOCYTES NFR BLD AUTO: 7 % (ref 0–13.4)
NEUTROPHILS # BLD AUTO: 3.37 K/UL (ref 2–7.15)
NEUTROPHILS NFR BLD: 61.7 % (ref 44–72)
NRBC # BLD AUTO: 0 K/UL
NRBC BLD-RTO: 0 /100 WBC
PLATELET # BLD AUTO: 140 K/UL (ref 164–446)
PMV BLD AUTO: 9.8 FL (ref 9–12.9)
POTASSIUM SERPL-SCNC: 4.8 MMOL/L (ref 3.6–5.5)
PROT SERPL-MCNC: 5.7 G/DL (ref 6–8.2)
RBC # BLD AUTO: 2.3 M/UL (ref 4.2–5.4)
SODIUM SERPL-SCNC: 136 MMOL/L (ref 135–145)
TIBC SERPL-MCNC: 196 UG/DL (ref 250–450)
VIT B12 SERPL-MCNC: 159 PG/ML (ref 211–911)
WBC # BLD AUTO: 5.5 K/UL (ref 4.8–10.8)

## 2019-11-08 PROCEDURE — 82728 ASSAY OF FERRITIN: CPT

## 2019-11-08 PROCEDURE — A9270 NON-COVERED ITEM OR SERVICE: HCPCS | Performed by: STUDENT IN AN ORGANIZED HEALTH CARE EDUCATION/TRAINING PROGRAM

## 2019-11-08 PROCEDURE — 83550 IRON BINDING TEST: CPT

## 2019-11-08 PROCEDURE — 80053 COMPREHEN METABOLIC PANEL: CPT

## 2019-11-08 PROCEDURE — 36415 COLL VENOUS BLD VENIPUNCTURE: CPT

## 2019-11-08 PROCEDURE — 700111 HCHG RX REV CODE 636 W/ 250 OVERRIDE (IP): Performed by: STUDENT IN AN ORGANIZED HEALTH CARE EDUCATION/TRAINING PROGRAM

## 2019-11-08 PROCEDURE — 85025 COMPLETE CBC W/AUTO DIFF WBC: CPT

## 2019-11-08 PROCEDURE — 700102 HCHG RX REV CODE 250 W/ 637 OVERRIDE(OP): Performed by: INTERNAL MEDICINE

## 2019-11-08 PROCEDURE — 82746 ASSAY OF FOLIC ACID SERUM: CPT

## 2019-11-08 PROCEDURE — 83540 ASSAY OF IRON: CPT

## 2019-11-08 PROCEDURE — 99217 PR OBSERVATION CARE DISCHARGE: CPT | Mod: GC | Performed by: HOSPITALIST

## 2019-11-08 PROCEDURE — 82607 VITAMIN B-12: CPT

## 2019-11-08 PROCEDURE — 96376 TX/PRO/DX INJ SAME DRUG ADON: CPT

## 2019-11-08 PROCEDURE — 82272 OCCULT BLD FECES 1-3 TESTS: CPT

## 2019-11-08 PROCEDURE — 700102 HCHG RX REV CODE 250 W/ 637 OVERRIDE(OP): Performed by: STUDENT IN AN ORGANIZED HEALTH CARE EDUCATION/TRAINING PROGRAM

## 2019-11-08 PROCEDURE — A9270 NON-COVERED ITEM OR SERVICE: HCPCS | Performed by: INTERNAL MEDICINE

## 2019-11-08 PROCEDURE — 82962 GLUCOSE BLOOD TEST: CPT | Mod: 91

## 2019-11-08 PROCEDURE — G0378 HOSPITAL OBSERVATION PER HR: HCPCS

## 2019-11-08 RX ORDER — TORSEMIDE 20 MG/1
20 TABLET ORAL 2 TIMES DAILY
Qty: 30 TAB | Refills: 3 | Status: SHIPPED | OUTPATIENT
Start: 2019-11-08

## 2019-11-08 RX ORDER — OMEPRAZOLE 20 MG/1
20 CAPSULE, DELAYED RELEASE ORAL DAILY
Qty: 30 CAP | Refills: 0 | Status: SHIPPED | OUTPATIENT
Start: 2019-11-08 | End: 2019-12-08

## 2019-11-08 RX ORDER — LISINOPRIL 40 MG/1
40 TABLET ORAL DAILY
Qty: 30 TAB | Refills: 0 | Status: SHIPPED | OUTPATIENT
Start: 2019-11-08

## 2019-11-08 RX ADMIN — ASPIRIN 81 MG 81 MG: 81 TABLET ORAL at 07:11

## 2019-11-08 RX ADMIN — HYDRALAZINE HYDROCHLORIDE 20 MG: 20 INJECTION INTRAMUSCULAR; INTRAVENOUS at 10:43

## 2019-11-08 RX ADMIN — TORSEMIDE 20 MG: 20 TABLET ORAL at 07:13

## 2019-11-08 RX ADMIN — OMEPRAZOLE 20 MG: 20 CAPSULE, DELAYED RELEASE ORAL at 07:13

## 2019-11-08 RX ADMIN — CARVEDILOL 25 MG: 6.25 TABLET, FILM COATED ORAL at 10:14

## 2019-11-08 RX ADMIN — FERROUS SULFATE TAB 325 MG (65 MG ELEMENTAL FE) 325 MG: 325 (65 FE) TAB at 09:22

## 2019-11-08 RX ADMIN — LEVOTHYROXINE SODIUM 112 MCG: 112 TABLET ORAL at 07:12

## 2019-11-08 RX ADMIN — LISINOPRIL 40 MG: 20 TABLET ORAL at 07:11

## 2019-11-08 ASSESSMENT — ENCOUNTER SYMPTOMS
NECK PAIN: 0
ABDOMINAL PAIN: 0
CHILLS: 0
HEADACHES: 0
BLOOD IN STOOL: 0
PALPITATIONS: 0
LOSS OF CONSCIOUSNESS: 0
DIZZINESS: 0
DEPRESSION: 0
DIARRHEA: 0
COUGH: 0
BRUISES/BLEEDS EASILY: 0
SHORTNESS OF BREATH: 0
PHOTOPHOBIA: 0
NAUSEA: 0
CONSTIPATION: 0
FEVER: 0
BACK PAIN: 0
VOMITING: 0
SPUTUM PRODUCTION: 0
EYE PAIN: 0

## 2019-11-08 ASSESSMENT — PATIENT HEALTH QUESTIONNAIRE - PHQ9
2. FEELING DOWN, DEPRESSED, IRRITABLE, OR HOPELESS: NOT AT ALL
SUM OF ALL RESPONSES TO PHQ9 QUESTIONS 1 AND 2: 0
1. LITTLE INTEREST OR PLEASURE IN DOING THINGS: NOT AT ALL

## 2019-11-08 NOTE — PROGRESS NOTES
Report received from GALI Jon.  Patient sitting up in bed with family at the bedside.  Patient A & O  X 4.  No apparent signs of distress or complaints of pain.  Safety precautions in place.  Patient educated to call for assistance.  Will continue to monitor.

## 2019-11-08 NOTE — DISCHARGE SUMMARY
Internal Medicine Discharge Summary  Note Author: Michael Rodriguez M.D.       Name Sandra Austin 1950   Age/Sex 69 y.o. female   MRN [id number]       Admit Date:  2019       Discharge Date:   19    Service:   [facility] Internal Medicine Red Team  Attending Physician(s):   Dr. Salmeron       Senior Resident(s):   Dr. Rodriguez  Luis Resident(s):   Dr. Ibarra  PCP: ESTEBAN Cortez    Primary Diagnosis:   Gastroesophageal Reflux Disease  Hypertension     Secondary Diagnoses:                Principal Problem (Resolved):    Chest pain POA: Yes  Active Problems:    CKD stage 4 due to type 2 diabetes mellitus (HCC) POA: Yes    Anemia POA: Unknown    Type 2 diabetes mellitus, with long-term current use of insulin (HCC) POA: Yes    Hypothyroidism POA: Yes    Dyslipidemia POA: Yes  Resolved Problems:    Dysphagia POA: Yes    Hypertensive urgency POA: Yes      Hospital Summary (Brief Narrative):       69F, PMH of hypertension, diabetes mellitus type II, chronic disease stage IV, dyslipidemia, corneal injury of left eye, hypothyroidism; presented with chest pain and shortness of breath. BP elevated to 224/105 in ED, given nitropaste and sublingual nitroglycerin which improved BP but did not improve chest pain. Troponin trend normal, ekg showing SR without ST abnormalities. Echo with EF65, normal LV function, otherwise normal. Due to history of dysphagia SLP evaluation pursued with patient passing. Chest pain resolved during admission, thought to be related to GERD or possible associated esophageal spasm and omeprazole started. Patient with persistent elevated BP with readings >200 SBP, discussed with Dr. Geronimo, who assessed CKD as stable with last outpatient Cr 2.6, recommending increasing lisinopril to 40mg and adding torsemide 20mg BID (as patient had edema with amlodipine in past), otherwise hydralazine PRN. She has appointment scheduled with nephrology on . BP improved prior to  discharge on the recommended medications by nephrology. Patient also with chronic anemia due to CKD. Her Hgb was slightly lower than baseline, but stable this hospitalization. FOBT was negative and she was without signs of active bleeding. She is already on iron. She will follow-up outpatient about this.      Medication reconciliation per below, follow up arranged with PCP and nephrology; discussed with patient. Patient discharged in stable condition.    Patient /Hospital Summary (Details -- Problem Oriented) :          Chest pain- (present on admission)  Assessment & Plan  Admitted with complaints of 3 days of chest pain, not consistent with angina. Workup for ACS was negative. Echo normal, troponin trend was negative, EKGs were nonischemic and she has been stable on telemetry. Negative stress test in 2/2019. Patient's chest pain has now resolved. Suspect GERD is the cause of her chest pain.      Plan:  -GI cocktail as needed for another episode of chest pain   -Passed SLP evaluation, can consider barium swallow outpatient  -Omeprazole 20mg QD     Dysphagia- (present on admission)  Assessment & Plan  Unclear etiology. Most likely esophageal spasm as she improved with nitro. GERD is likely contributing so she was started on a PPI.      Plan:  -Patient passed SLP evaluation without signs of dysphagia   -GI cocktail as needed for further episodes of pain   -Added PPI      CKD stage 4 due to type 2 diabetes mellitus (HCC)- (present on admission)  Assessment & Plan  Secondary to diabetes. Renal biopsy about a year ago showed diabetic nephropathy. Follows with Dr. Yung outpatient. Patient discussed with Dr. Geronimo, on call for nephrology. He states that the patient's baseline Cr is around 2.6. They have made recommendations for blood pressure control which have been implemented below.      Plan:  -Patient at about baseline Cr with mild bump due to starting blood pressure medications  -Continue lisinopril to 40mg  QD  -Continue torsemide 20mg BID   -Patient will followup outpatient with her nephrologist for her already scheduled appointment on 11/19      Hypertensive urgency- (present on admission)  Assessment & Plan  Came in with hypertensive urgency, which has responded to PRN labetalol and hydralazine. Discussion with Dr. Geronimo, on call for nephrology. He has made recommendations for blood pressure control which have helped to control her pressures better.      Plan:  -Continue carvediol  -Continue lisinopril to 40mg QD per nephrology recommendations  -Continue torsemide 20mg BID per nephrology recommendations  -Patient will followup outpatient with her nephrologist for her already scheduled appointment on 11/19      Anemia  Assessment & Plan  Patient with chronic anemia, likely secondary to CKD. Baseline appears to be around 10. Slightly lower this visit around 7.6, but stable. No signs of active bleeding. FOBT and other workup negative. Patient already on iron supplements. She will follow up with her nephrologist next week as well as her PCP for a recheck.      Plan:  -Likely chronic. Stable this visit without signs of active bleeding  -Outpatient follow-up      Dyslipidemia- (present on admission)  Assessment & Plan  On pravastatin as an outpatient. ACS is ruled out so we will continue her home dose.      Hypothyroidism- (present on admission)  Assessment & Plan  Resume levothyroxine 112 mcg.     Type 2 diabetes mellitus, with long-term current use of insulin (HCC)- (present on admission)  Assessment & Plan  A1c today is 5.5. She usues 10 units of Lantus  and Januvia 50mg at home.     Plan:  -Continue Lantus 10u QHS  -Held Januvia 50 mg inpatient, to resume outpatient    Consultants:     None    Procedures:        None    Imaging/ Testing:      EC-ECHOCARDIOGRAM COMPLETE W/O CONT   Final Result      DX-CHEST-2 VIEWS   Final Result         1.  No acute cardiopulmonary disease.   2.  Atherosclerosis        Discharge  Medications:         Medication Reconciliation: Completed       Medication List      START taking these medications      Instructions   omeprazole 20 MG delayed-release capsule  Commonly known as:  PRILOSEC   Take 1 Cap by mouth every day for 30 days. For acid reflux  Dose:  20 mg     torsemide 20 MG Tabs  Commonly known as:  DEMADEX   Take 1 Tab by mouth 2 Times a Day. For blood pressure control  Dose:  20 mg        CHANGE how you take these medications      Instructions   lisinopril 40 MG tablet  What changed:    · medication strength  · how much to take  · additional instructions  Commonly known as:  PRINIVIL   Take 1 Tab by mouth every day. For blood pressure control  Dose:  40 mg        CONTINUE taking these medications      Instructions   atorvastatin 40 MG Tabs  Commonly known as:  LIPITOR   Take 1 Tab by mouth every evening.  Dose:  40 mg     calcium carbonate 1250 (500 Ca) MG Tabs  Commonly known as:  OS-ERICH 500   Take 500 mg by mouth every day.  Dose:  500 mg     carvedilol 25 MG Tabs  Commonly known as:  COREG   Take 25 mg by mouth 2 times a day, with meals.  Dose:  25 mg     ferrous sulfate 325 (65 Fe) MG tablet   Take 325 mg by mouth 3 times a day.  Dose:  325 mg     insulin lispro (Human) 100 UNIT/ML injection PEN  Commonly known as:  HUMALOG KWIKPEN   Inject 2-10 Units as instructed 4 Times a Day,Before Meals and at Bedtime.  Dose:  2-10 Units     LEVEMIR FLEXTOUCH 100 UNIT/ML injection PEN  Generic drug:  insulin detemir   Inject 30 Units as instructed every bedtime.  Dose:  30 Units     levothyroxine 112 MCG Tabs  Commonly known as:  SYNTHROID   Take 112 mcg by mouth Every morning on an empty stomach.  Dose:  112 mcg     pravastatin 10 MG Tabs  Commonly known as:  PRAVACHOL   Take 10 mg by mouth every evening.  Dose:  10 mg     SITagliptin 50 MG Tabs  Commonly known as:  JANUVIA   Take 1 Tab by mouth every day.  Dose:  50 mg     vitamin D 1000 UNIT Tabs  Commonly known as:  cholecalciferol   Take  2,000 Units by mouth every day.  Dose:  2,000 Units        STOP taking these medications    amLODIPine 10 MG Tabs  Commonly known as:  NORVASC     raNITidine 150 MG Tabs  Commonly known as:  ZANTAC            Disposition:   Discharge home in stable condition    Diet:   Diabetic     Activity:   As tolerated    Instructions:      Follow up with your nephrologist and PCP  Take all new mediations as prescribed     The patient was instructed to return to the ER in the event of worsening symptoms. I have counseled the patient on the importance of compliance and the patient has agreed to proceed with all medical recommendations and follow up plan indicated above.   The patient understands that all medications come with benefits and risks. Risks may include permanent injury or death and these risks can be minimized with close reassessment and monitoring.        Primary Care Provider:    ESTEBAN Cortez  Discharge summary faxed to primary care provider:  Completed  Copy of discharge summary given to the patient: Completed      Follow up appointment details :      No future appointments.  ESTEBAN Cortez  [address] [place] [id number] [phone]      Please call or walk in to schedule an appointment with your primary care provider as soon as possible. Thank you.     Pending Studies:        None    Time spent on discharge day patient visit, preparing discharge paperwork and arranging for patient follow up.    Summary of follow up issues:   Blood pressure check  Hemoglobin check    Discharge Time (Minutes) :    >35 minutes  Hospital Course Type: Observation Stay      Condition on Discharge  Stable  ______________________________________________________________________    Interval history/exam for day of discharge:     Interval Problem Daily Status Update    -Patient stable overnight, blood pressure is much more controlled after starting new antihypertensives that nephrology recommended  -No  further episodes of chest pain  -Hgb with slightly lower than baseline this hospitalization. FOBT and other workup negative and patient is without signs of active.   -Stable for discharge today     Physical Exam   Constitutional: She is oriented to person, place, and time and well-developed, well-nourished, and in no distress. No distress.   HENT:   Head: Normocephalic and atraumatic.   Chronic left eye changes   Eyes: Right eye exhibits no discharge. Left eye exhibits no discharge. No scleral icterus.   Neck: No JVD present. No tracheal deviation present.   Cardiovascular: Normal rate and regular rhythm.   No murmur heard.  Pulmonary/Chest: Effort normal and breath sounds normal. No respiratory distress. She has no wheezes. She has no rales.   Abdominal: Soft. She exhibits no distension. There is no tenderness.   Musculoskeletal:         General: No edema.   Lymphadenopathy:     She has no cervical adenopathy.   Neurological: She is alert and oriented to person, place, and time. No cranial nerve deficit.   Skin: Skin is warm and dry. No rash noted.   Psychiatric: Mood, memory, affect and judgment normal.   Nursing note and vitals reviewed.    Most Recent Labs:    Lab Results   Component Value Date/Time    WBC 5.5 11/07/2019 01:28 AM    RBC 2.38 (L) 11/07/2019 01:28 AM    HEMOGLOBIN 7.9 (L) 11/07/2019 01:28 AM    HEMATOCRIT 23.8 (L) 11/07/2019 01:28 AM    .0 (H) 11/07/2019 01:28 AM    MCH 33.2 (H) 11/07/2019 01:28 AM    MCHC 33.2 (L) 11/07/2019 01:28 AM    MPV 10.0 11/07/2019 01:28 AM    NEUTSPOLYS 60.30 11/05/2019 11:45 PM    LYMPHOCYTES 32.30 11/05/2019 11:45 PM    MONOCYTES 6.30 11/05/2019 11:45 PM    EOSINOPHILS 0.60 11/05/2019 11:45 PM    BASOPHILS 0.30 11/05/2019 11:45 PM      Lab Results   Component Value Date/Time    SODIUM 137 11/07/2019 01:28 AM    POTASSIUM 4.9 11/07/2019 01:28 AM    CHLORIDE 113 (H) 11/07/2019 01:28 AM    CO2 19 (L) 11/07/2019 01:28 AM    GLUCOSE 111 (H) 11/07/2019 01:28 AM    BUN  35 (H) 11/07/2019 01:28 AM    CREATININE 2.68 (H) 11/07/2019 01:28 AM      Lab Results   Component Value Date/Time    ALTSGPT 8 11/07/2019 01:28 AM    ASTSGOT 11 (L) 11/07/2019 01:28 AM    ALKPHOSPHAT 117 (H) 11/07/2019 01:28 AM    TBILIRUBIN 0.3 11/07/2019 01:28 AM    DBILIRUBIN 0.2 06/12/2017 09:47 AM    LIPASE 139 (H) 02/24/2019 07:36 PM    ALBUMIN 2.8 (L) 11/07/2019 01:28 AM    GLOBULIN 3.1 11/07/2019 01:28 AM    INR 1.04 10/09/2018 03:23 PM     Lab Results   Component Value Date/Time    PROTHROMBTM 13.7 10/09/2018 03:23 PM    INR 1.04 10/09/2018 03:23 PM

## 2019-11-08 NOTE — PROGRESS NOTES
Internal Medicine Interval Note  Note Author: Mary Ibarra M.D.     Name Sandra Austin 1950   Age/Sex 69 y.o. female   MRN [id number]   Code Status FULL     After 5PM or if no immediate response to page, please call for cross-coverage  Attending/Team: Dr. Salmeron - Red Team See Patient List for primary contact information  Call [phone] to page    1st Call - Day Intern (R1):   Dr. Ibarra 2nd Call - Day Sr. Resident (R2/R3):   Dr. Rodriguez     Reason for interval visit    Chest Pain  Hypertensive Urgency    Interval Problem Daily Status Update    -Patient stable overnight, blood pressure is much more controlled after starting new antihypertensives that nephrology recommended  -No further episodes of chest pain  -Hgb with slightly lower than baseline this hospitalization. FOBT and other workup negative and patient is without signs of active.   -Stable for discharge today     Review of Systems   Constitutional: Negative for chills and fever.   HENT: Negative for ear pain and tinnitus.    Eyes: Negative for photophobia and pain.   Respiratory: Negative for cough, sputum production and shortness of breath.    Cardiovascular: Negative for chest pain, palpitations and leg swelling.   Gastrointestinal: Negative for abdominal pain, blood in stool, constipation, diarrhea, melena, nausea and vomiting.   Genitourinary: Negative for dysuria and urgency.   Musculoskeletal: Negative for back pain and neck pain.   Skin: Negative for itching and rash.   Neurological: Negative for dizziness, loss of consciousness and headaches.   Endo/Heme/Allergies: Negative for environmental allergies. Does not bruise/bleed easily.   Psychiatric/Behavioral: Negative for depression and suicidal ideas.   All other systems reviewed and are negative.    Disposition/Barriers to discharge:   Discharge home today    Consultants/Specialty  None  PCP: ESTEBAN Cortez    Quality Measures  Quality-Core Measures   Pee  catheter::  No [place]  DVT prophylaxis pharmacological::  Heparin  Ulcer Prophylaxis::  Yes    Physical Exam     Vitals:    11/07/19 1800 11/07/19 1959 11/08/19 0023 11/08/19 0409   BP: (!) 182/81 141/67 128/60 (!) 170/77   Pulse: 72 79 86 78   Resp:  16 20 14   Temp:  36.4 °C (97.5 °F) 36.9 °C (98.4 °F) 36.8 °C (98.2 °F)   TempSrc:  Temporal Temporal Temporal   SpO2:  96% 98% 97%   Weight:       Height:         Body mass index is 24.27 kg/m².    Oxygen Therapy:  Pulse Oximetry: 97 %, O2 (LPM): 0, O2 Delivery: None (Room Air)    Physical Exam   Constitutional: She is oriented to person, place, and time and well-developed, well-nourished, and in no distress. No distress.   HENT:   Head: Normocephalic and atraumatic.   Chronic left eye changes   Eyes: Right eye exhibits no discharge. Left eye exhibits no discharge. No scleral icterus.   Neck: No JVD present. No tracheal deviation present.   Cardiovascular: Normal rate and regular rhythm.   No murmur heard.  Pulmonary/Chest: Effort normal and breath sounds normal. No respiratory distress. She has no wheezes. She has no rales.   Abdominal: Soft. She exhibits no distension. There is no tenderness.   Musculoskeletal:         General: No edema.   Lymphadenopathy:     She has no cervical adenopathy.   Neurological: She is alert and oriented to person, place, and time. No cranial nerve deficit.   Skin: Skin is warm and dry. No rash noted.   Psychiatric: Mood, memory, affect and judgment normal.   Nursing note and vitals reviewed.      Assessment/Plan     * Chest pain- (present on admission)  Assessment & Plan  Admitted with complaints of 3 days of chest pain, not consistent with angina. Workup for ACS was negative. Echo normal, troponin trend was negative, EKGs were nonischemic and she has been stable on telemetry. Negative stress test in 2/2019. Patient's chest pain has now resolved. Suspect GERD is the cause of her chest pain.     Plan:  -GI cocktail as needed for another  episode of chest pain   -Passed SLP evaluation, can consider barium swallow outpatient  -Omeprazole 20mg QD    Dysphagia- (present on admission)  Assessment & Plan  Unclear etiology. Most likely esophageal spasm as she improved with nitro. GERD is likely contributing so she was started on a PPI.     Plan:  -Patient passed SLP evaluation without signs of dysphagia   -GI cocktail as needed for further episodes of pain   -Added PPI     CKD stage 4 due to type 2 diabetes mellitus (HCC)- (present on admission)  Assessment & Plan  Secondary to diabetes. Renal biopsy about a year ago showed diabetic nephropathy. Follows with Dr. Yung outpatient. Patient discussed with Dr. Geronimo, on call for nephrology. He states that the patient's baseline Cr is around 2.6. They have made recommendations for blood pressure control which have been implemented below.     Plan:  -Patient at about baseline Cr with mild bump due to starting blood pressure medications  -Continue lisinopril to 40mg QD  -Continue torsemide 20mg BID   -Patient will followup outpatient with her nephrologist for her already scheduled appointment on 11/19     Hypertensive urgency- (present on admission)  Assessment & Plan  Came in with hypertensive urgency, which has responded to PRN labetalol and hydralazine. Discussion with Dr. Geronimo, on call for nephrology. He has made recommendations for blood pressure control which have helped to control her pressures better.     Plan:  -Continue carvediol  -Continue lisinopril to 40mg QD per nephrology recommendations  -Continue torsemide 20mg BID per nephrology recommendations  -Patient will followup outpatient with her nephrologist for her already scheduled appointment on 11/19     Anemia  Assessment & Plan  Patient with chronic anemia, likely secondary to CKD. Baseline appears to be around 10. Slightly lower this visit around 7.6, but stable. No signs of active bleeding. FOBT and other workup negative. Patient already  on iron supplements. She will follow up with her nephrologist next week as well as her PCP for a recheck.     Plan:  -Likely chronic. Stable this visit without signs of active bleeding  -Outpatient follow-up     Dyslipidemia- (present on admission)  Assessment & Plan  On pravastatin as an outpatient. ACS is ruled out so we will continue her home dose.     Hypothyroidism- (present on admission)  Assessment & Plan  Resume levothyroxine 112 mcg.    Type 2 diabetes mellitus, with long-term current use of insulin (HCC)- (present on admission)  Assessment & Plan  A1c today is 5.5. She usues 10 units of Lantus  and Januvia 50mg at home.    Plan:  -Continue Lantus 10u QHS  -Held Januvia 50 mg inpatient, to resume outpatient    Mary Ibarra

## 2019-11-08 NOTE — DISCHARGE INSTRUCTIONS
Discharge Instructions    Discharged to home by car with relative. Discharged via walking, hospital escort: Yes.  Special equipment needed: Not Applicable    Be sure to schedule a follow-up appointment with your primary care doctor or any specialists as instructed.     Discharge Plan:   Diet Plan: Discussed  Activity Level: Discussed  Confirmed Follow up Appointment: Appointment Scheduled  Confirmed Symptoms Management: Discussed  Medication Reconciliation Updated: Yes  Influenza Vaccine Indication: Patient Refuses    I understand that a diet low in cholesterol, fat, and sodium is recommended for good health. Unless I have been given specific instructions below for another diet, I accept this instruction as my diet prescription.   Other diet: Heart Healthy     Special Instructions: None    · Is patient discharged on Warfarin / Coumadin?   No     Depression / Suicide Risk    As you are discharged from this [facility] facility, it is important to learn how to keep safe from harming yourself.    Recognize the warning signs:  · Abrupt changes in personality, positive or negative- including increase in energy   · Giving away possessions  · Change in eating patterns- significant weight changes-  positive or negative  · Change in sleeping patterns- unable to sleep or sleeping all the time   · Unwillingness or inability to communicate  · Depression  · Unusual sadness, discouragement and loneliness  · Talk of wanting to die  · Neglect of personal appearance   · Rebelliousness- reckless behavior  · Withdrawal from people/activities they love  · Confusion- inability to concentrate     If you or a loved one observes any of these behaviors or has concerns about self-harm, here's what you can do:  · Talk about it- your feelings and reasons for harming yourself  · Remove any means that you might use to hurt yourself (examples: pills, rope, extension cords, firearm)  · Get professional help from the community (Mental Health,  Substance Abuse, psychological counseling)  · Do not be alone:Call your Safe Contact- someone whom you trust who will be there for you.  · Call your local CRISIS HOTLINE 350-1018 or 176-430-5759  · Call your local Children's Mobile Crisis Response Team [place] (857) 355-5227 or www.[employer].uTest  · Call the toll free National Suicide Prevention Hotlines   · National Suicide Prevention Lifeline 226-953-BNDN (7517)  · National Hope Line Network 800-SUICIDE (081-1110)        Hipertensión  (Hypertension)  La hipertensión, conocida comúnmente ashely presión arterial lainey, se produce cuando la rajan bombea en las arterias con mucha fuerza. Las arterias son los vasos sanguíneos que transportan la rajan desde el corazón hacia todas las partes del cuerpo. Nelli lectura de la presión arterial consiste en un número más alto sobre un número más bajo, por ejemplo, 110/72. El número más alto (presión sistólica) corresponde a la presión interna de las arterias cuando el corazón bombea rajan. El número más bajo (presión diastólica) corresponde a la presión interna de las arterias cuando el corazón se relaja. En condiciones ideales, la presión arterial debe ser inferior a 120/80.  La hipertensión fuerza al corazón a trabajar más para bombear la rajan. Las arterias pueden estrecharse o ponerse rígidas. La hipertensión no tratada o no controlada puede causar infarto de miocardio, ictus, enfermedad renal y otros problemas.  FACTORES DE RIESGO  Algunos factores de riesgo de hipertensión son controlables, candi otros no lo son.  Entre los factores de riesgo que usted no puede controlar, se incluyen los siguientes:  · La denisse. El riesgo es mayor para las personas afroamericanas.  · La edad. Los riesgos aumentan con la edad.  · El sexo. Antes de los 45 años, los hombres corren más riesgo que las mujeres. Después de los 65 años, las mujeres corren más riesgo que los hombres.  Entre los factores de riesgo que usted puede controlar,  se incluyen los siguientes:  · No hacer la cantidad suficiente de actividad física o ejercicio.  · Tener sobrepeso.  · Consumir mucha grasa, azúcar, calorías o sal en la dieta.  · Beber alcohol en exceso.  SIGNOS Y SÍNTOMAS  Por lo general, la hipertensión no causa signos o síntomas. La hipertensión arterial demasiado lainey (crisis hipertensiva) puede causar dolor de tita, ansiedad, falta de aire y hemorragia nasal.  DIAGNÓSTICO  Para detectar si usted tiene hipertensión, el médico le medirá la presión arterial mientras esté sentado, con el brazo levantado a la altura del corazón. Debe medirla al menos dos veces en el mismo brazo. Determinadas condiciones pueden causar mara diferencia de presión arterial entre el brazo william y el derecho. El hecho de tener mara dayanna lectura de la presión arterial más lainey que lo normal no significa que necesita un tratamiento. Si no está dara si tiene hipertensión arterial, es posible que se le pida que regrese otro día para volver a controlarle la presión arterial. O hawk se le puede pedir que se controle la presión arterial en cardona casa deb 1 o más meses.  TRATAMIENTO  El tratamiento de la hipertensión arterial incluye hacer cambios en el estilo de luz y, posiblemente, fernando medicamentos. Un estilo de luz saludable puede ayudar a bajar la presión arterial lainey. Quizá deba cambiar algunos hábitos.  Los cambios en el estilo de luz pueden incluir lo siguiente:  · Seguir la dieta DASH. Esta dieta tiene un alto contenido de frutas, verduras y cereales integrales. Incluye poca cantidad de sal, ye manning y azúcares agregados.  · Mantenga el consumo de sodio por debajo de 2 300 mg por día.  · Realizar al menos entre 30 y 45 minutos de ejercicio aeróbico, 4 veces por semana ashely mínimo.  · Perder peso, si es necesario.  · No fumar.  · Limitar el consumo de bebidas alcohólicas.  · Aprender formas de reducir el estrés.  El médico puede recetarle medicamentos si los cambios en el  estilo de luz no son suficientes para lograr controlar la presión arterial y si mara de las siguientes afirmaciones es verdadera:  · Tiene entre 18 y 59 años y cardona presión arterial sistólica está por encima de 140.  · Tiene 60 años o más y cardona presión arterial sistólica está por encima de 150.  · Cardona presión arterial diastólica está por encima de 90.  · Tiene diabetes y cardona presión arterial sistólica está por encima de 140 o cardona presión arterial diastólica está por encima de 90.  · Tiene mara enfermedad renal y cardona presión arterial está por encima de 140/90.  · Tiene mara enfermedad cardíaca y cardona presión arterial está por encima de 140/90.  La presión arterial deseada puede variar en función de las enfermedades, la edad y otros factores personales.  INSTRUCCIONES PARA EL CUIDADO EN EL HOGAR  · Kamla que le midan de nuevo la presión arterial según las indicaciones del médico.  · [place] los medicamentos solamente ashely se lo haya indicado el médico. Siga cuidadosamente las indicaciones. Los medicamentos para la presión arterial deben tomarse según las indicaciones. Los medicamentos pierden eficacia al omitir las dosis. El hecho de omitir las dosis también aumenta el riesgo de otros problemas.  · No fume.  · Contrólese la presión arterial en cardona casa según las indicaciones del médico.  SOLICITE ATENCIÓN MÉDICA SI:  · Piensa que tiene mara reacción alérgica a los medicamentos.  · Tiene mareos o manjit de tita con recurrencia.  · Tiene hinchazón en los tobillos.  · Tiene problemas de visión.  SOLICITE ATENCIÓN MÉDICA DE INMEDIATO SI:  · Siente un dolor de tita intenso o confusión.  · Siente debilidad inusual, adormecimiento o que se desmayará.  · Siente dolor intenso en el pecho o en el abdomen.  · Vomita repetidas veces.  · Tiene dificultad para respirar.  ASEGÚRESE DE QUE:  · Comprende estas instrucciones.  · Controlará cardona afección.  · Recibirá ayuda de inmediato si no mejora o si empeora.  Esta información no tiene ashely fin  reemplazar el consejo del médico. Asegúrese de hacerle al médico cualquier pregunta que tenga.  Document Released: 12/18/2006 Document Revised: 05/03/2016 Document Reviewed: 10/10/2014  Elsevier Interactive Patient Education © 2017 [employer]

## 2019-11-08 NOTE — ASSESSMENT & PLAN NOTE
Patient with chronic anemia, likely secondary to CKD. Baseline appears to be around 10. Slightly lower this visit around 7.6, but stable. No signs of active bleeding. FOBT and other workup negative. Patient already on iron supplements. She will follow up with her nephrologist next week as well as her PCP for a recheck.     Plan:  -Likely chronic. Stable this visit without signs of active bleeding  -Outpatient follow-up

## 2019-11-08 NOTE — PROGRESS NOTES
Discharging patient home. Verbalized understanding of discharge instructions, follow up appointments, and home care. All questions answered.  Belongings with patient at time of discharge.  Vitals signs WNL. Pt given discharge information. Discharge assessment completed.     Family helped with translating with pt's permission

## 2019-11-08 NOTE — CARE PLAN
Problem: Safety  Goal: Will remain free from falls  Outcome: PROGRESSING AS EXPECTED  Note:   Pt educated on safety precautions, utilization of the call light, and bed alarm.  Pt verbalized understanding.      Problem: Pain Management  Goal: Pain level will decrease to patient's comfort goal  Outcome: PROGRESSING AS EXPECTED  Note:   Implement standard precautions and perform handwashing before and after patient contact. RN will follow protocols and necessary steps to minimize the spread of infection.  RN educated pt and any visitors on proper hand hygiene.

## 2021-03-03 DIAGNOSIS — Z23 NEED FOR VACCINATION: ICD-10-CM

## 2021-12-11 NOTE — ED TRIAGE NOTES
"Chief Complaint   Patient presents with   • Chest Pressure   • Hypertension     Pt reports chest pain and SOB with associated HTN for three days. Pt has HX of HTN and currently taking BP medications. Positive for nausea w/o vomiting. ED charge RN Suzanne notified of SHREYAS level 2 and pt taken to red 3.      BP (!) 214/105 Comment: Pt has Hx of HBP. Pt currently taking Rx.  Pulse 87   Temp 37.1 °C (98.7 °F) (Oral)   Resp 16   Ht 1.575 m (5' 2\")   Wt 61.8 kg (136 lb 3.9 oz)   SpO2 99%   BMI 24.92 kg/m²     " Abdomen soft, non-tender and non-distended, no rebound, no guarding and no masses. no hepatosplenomegaly.

## 2025-04-04 ENCOUNTER — DOCUMENTATION (OUTPATIENT)
Facility: MEDICAL CENTER | Age: 75
End: 2025-04-04
Payer: MEDICARE

## 2025-05-15 ENCOUNTER — TELEPHONE (OUTPATIENT)
Facility: MEDICAL CENTER | Age: 75
End: 2025-05-15
Payer: MEDICARE

## 2025-05-15 NOTE — TELEPHONE ENCOUNTER
Called patient Sandra Rivera to discuss referral intake to [facility]. No answer. Left a voicemail with return call instructions via  Zackery ID#[id number]    Ping Quesada    [facility] [place]

## 2025-05-19 ENCOUNTER — TELEPHONE (OUTPATIENT)
Facility: MEDICAL CENTER | Age: 75
End: 2025-05-19
Payer: MEDICARE

## 2025-05-19 NOTE — TELEPHONE ENCOUNTER
Called patient Sandra Rivera to discuss referral intake to [facility]. No answer. Left a voicemail with return call instructions via  Jerry, ID #[id number].     Ping Quesada    [facility] [place]

## 2025-05-20 ENCOUNTER — TELEPHONE (OUTPATIENT)
Facility: MEDICAL CENTER | Age: 75
End: 2025-05-20
Payer: MEDICARE

## 2025-05-20 NOTE — TELEPHONE ENCOUNTER
"[facility]  Kidney Transplant Program- Referral Review    Patient Information  Preferred Language: Ghanaian [3]   Required for Medical Discussions: Yes Will ID#[id number]  Emergency Contact:  Name: Heidy Pierce   Phone Number: [phone]   Relationship to Patient: Daughter [2]   Social Security Number Provided: No   BMI: Estimated body mass index is 24.27 kg/m² as calculated from the following:    Height as of 11/6/19: 1.575 m (5' 2\").    Weight as of 11/6/19: 60.2 kg (132 lb 11.5 oz).     Referral Information  Referring Nephrologist: Dr. Stafford  Currently on Dialysis: Yes  Dialysis Facility (if applicable): [facility]  [address] [place] [address]  [address] [address]  Dialysis Modality (if applicable): HD  Dialysis Schedule: M/W/F 2728 or clinical note documenting intiation of chronic maintenance dialysis obtained: Yes  Ambulatory assistance needed: No   If Yes, what type of assistance is required?N/A  Primary [facility] is most care received? [facility]  Have you had any recent hospitalizations? No    Hospital Name:   Dates:   Reason:    Specialty Providers  Primary Care Provider: Yes  Name: Dr. Kevin Queen  Phone:  Cardiologist: No   Name:  Urologist? No   Name:  Other Specialty Providers (e.g., Pulmonary, GI, Endocrinology, etc.)  Name & specialties:     Screening History  Colonscopy: Yes  Date: the beginning of the year  Location: hospital  Mammogram: Yes  Date: the beginning of the year  Location: \"The place where you get the MRI\"  Note: Provider information will be added to the patient's care team, and most recent notes and testing will be requested.     Transplant Information:  Previously received a transplant? No   Organ: N/A  Where:  When:  Currently listed for a transplant? No  If yes, list the transplant center(s):   Currently being evaluated for a transplant but not yet listed? No   If yes, list the transplant center(s):  Potential living " donors? No   Name(s):  Relationship(s): No    Insurance Information  Primary Insurance: [employer]  Secondary Insurance (if applicable): N/A  Insurance Cards (front and back) Uploaded: Yes    Documentation Checklist  The following documents are required and will be requested if not already provided:  Insurance Cards  Demographics Sheet  Most Recent History and Physical (H&P)  Most Recent Nephrology Notes  Current Medication List  Most Recent Lab Results  Most Recent Hospital Discharge Summary (if available)  Vaccine Records  CMS 2728 Form (required for ESRD patients)      Records will be requested and sent to RN coordinator for further review and planning. The patient is encouraged to contact us with any questions or medical updates during this process.      Consent for Records Request  Consent to request medical records is on file/required and was requested. If “required and requested” is selected, cascade to “and was obtained/declined; patient was advised they will be required to sign a consent in clinic or must obtain and provide the requested records.    Electronically Signed by: Ping Quesada,   Date & Time of Signature: 5/20/2025 3:56 PM

## 2025-06-10 ENCOUNTER — DOCUMENTATION (OUTPATIENT)
Facility: MEDICAL CENTER | Age: 75
End: 2025-06-10
Payer: MEDICARE

## 2025-06-10 DIAGNOSIS — Z01.818 PRE-TRANSPLANT EVALUATION FOR KIDNEY TRANSPLANT: Primary | ICD-10-CM
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
## 2025-06-17 ENCOUNTER — TELEPHONE (OUTPATIENT)
Facility: MEDICAL CENTER | Age: 75
End: 2025-06-17
Payer: MEDICARE

## 2025-06-17 NOTE — TELEPHONE ENCOUNTER
Called patient Sandra Rivera to discuss scheduling initial kidney evaluation appointment with  [facility] ID#[id number]. Scheduled 6/26 and confirmed details over the phone.    Ping Quesada    [facility] [place]

## 2025-06-25 ENCOUNTER — TELEPHONE (OUTPATIENT)
Facility: MEDICAL CENTER | Age: 75
End: 2025-06-25
Payer: MEDICARE

## 2025-06-25 NOTE — TELEPHONE ENCOUNTER
Called patient Sandra Rivera at [phone] via  [facility], ID# [id number] to provide appointment reminder for upcoming initial kidney evaluation appointment. No answer. A detailed voicemail was left with the appointment details, clinic address, and call back information.     Ping Quesada    [facility] [place]

## 2025-06-26 ENCOUNTER — OFFICE VISIT (OUTPATIENT)
Facility: MEDICAL CENTER | Age: 75
End: 2025-06-26
Attending: STUDENT IN AN ORGANIZED HEALTH CARE EDUCATION/TRAINING PROGRAM
Payer: COMMERCIAL

## 2025-06-26 VITALS
HEIGHT: 62 IN | OXYGEN SATURATION: 95 % | HEART RATE: 66 BPM | TEMPERATURE: 97.5 F | WEIGHT: 137 LBS | DIASTOLIC BLOOD PRESSURE: 60 MMHG | SYSTOLIC BLOOD PRESSURE: 140 MMHG | BODY MASS INDEX: 25.21 KG/M2

## 2025-06-26 DIAGNOSIS — I15.0 RENOVASCULAR HYPERTENSION: ICD-10-CM

## 2025-06-26 DIAGNOSIS — Z99.2 ESRD (END STAGE RENAL DISEASE) ON DIALYSIS (HCC): ICD-10-CM

## 2025-06-26 DIAGNOSIS — E11.22 TYPE 2 DIABETES MELLITUS WITH CHRONIC KIDNEY DISEASE ON CHRONIC DIALYSIS, WITH LONG-TERM CURRENT USE OF INSULIN (HCC): Primary | ICD-10-CM

## 2025-06-26 DIAGNOSIS — N18.6 ESRD (END STAGE RENAL DISEASE) ON DIALYSIS (HCC): ICD-10-CM

## 2025-06-26 DIAGNOSIS — Z01.818 PRE-TRANSPLANT EVALUATION FOR KIDNEY TRANSPLANT: ICD-10-CM

## 2025-06-26 DIAGNOSIS — N18.6 TYPE 2 DIABETES MELLITUS WITH CHRONIC KIDNEY DISEASE ON CHRONIC DIALYSIS, WITH LONG-TERM CURRENT USE OF INSULIN (HCC): Primary | ICD-10-CM

## 2025-06-26 DIAGNOSIS — I73.9 PVD (PERIPHERAL VASCULAR DISEASE) (HCC): ICD-10-CM

## 2025-06-26 DIAGNOSIS — Z79.4 TYPE 2 DIABETES MELLITUS WITH CHRONIC KIDNEY DISEASE ON CHRONIC DIALYSIS, WITH LONG-TERM CURRENT USE OF INSULIN (HCC): Primary | ICD-10-CM

## 2025-06-26 DIAGNOSIS — Z99.2 TYPE 2 DIABETES MELLITUS WITH CHRONIC KIDNEY DISEASE ON CHRONIC DIALYSIS, WITH LONG-TERM CURRENT USE OF INSULIN (HCC): Primary | ICD-10-CM

## 2025-06-26 DIAGNOSIS — Z01.818 PRE-TRANSPLANT EVALUATION FOR KIDNEY TRANSPLANT: Primary | ICD-10-CM

## 2025-06-26 ASSESSMENT — ENCOUNTER SYMPTOMS
RESPIRATORY NEGATIVE: 1
NAUSEA: 0
VOMITING: 0
HEMOPTYSIS: 0
DEPRESSION: 0
EYES NEGATIVE: 1
EYE DISCHARGE: 1
PSYCHIATRIC NEGATIVE: 1
PALPITATIONS: 0
NEUROLOGICAL NEGATIVE: 1
BLURRED VISION: 1
NECK PAIN: 0
COUGH: 0
DIARRHEA: 0
FEVER: 0
CARDIOVASCULAR NEGATIVE: 1
DIZZINESS: 0
MYALGIAS: 0
CONSTITUTIONAL NEGATIVE: 1
GASTROINTESTINAL NEGATIVE: 1
HEADACHES: 0
MUSCULOSKELETAL NEGATIVE: 1
CHILLS: 0

## 2025-06-26 ASSESSMENT — FIBROSIS 4 INDEX: FIB4 SCORE: 2.27
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
## 2025-06-26 NOTE — PROGRESS NOTES
"Initial Transplant Nutrition Evaluation     Sandra Rivera ([id number]) is a 75 y.o. female who presents for an initial Kidney transplant nutrition evaluation on 06/26/25. Pt presents with daughter, Heidy.  Ipad used (Jeffrey, [id number])    Nutrition Assessment     Anthropometrics:    Height:   Ht Readings from Last 1 Encounters:   06/26/25 1.575 m (5' 2\")    Weight:   Wt Readings from Last 1 Encounters:   06/26/25 62.1 kg (137 lb)     BMI: Body mass index is 25.06 kg/m². Class: Overweight  Suspected Dry Weight: 64.5 kg  Weight Hx:     Wt Readings from Last 10 Encounters:   06/26/25 62.1 kg (137 lb)   11/06/19 60.2 kg (132 lb 11.5 oz)   02/24/19 62.1 kg (136 lb 14.5 oz)   10/30/18 62 kg (136 lb 11 oz)   09/02/18 60.1 kg (132 lb 7.9 oz)   08/29/18 64.4 kg (142 lb)   04/25/18 64.4 kg (142 lb)   04/20/18 65.1 kg (143 lb 9.6 oz)   04/11/18 65.8 kg (145 lb)   02/23/18 66 kg (145 lb 6.4 oz)      Nutrition Focused Physical Exam (NFPE):   Muscle mass: Well Nourished  Subcutaneous fat: Well Nourished  Fluid Accumulation: None    Renal Replacement:   Primary Cause of ESRD: Type 2 Diabetes Mellitus with Diabetic Chronic Kidney Disease   Pt currently on Renal Replacement: HD  Date Started: April 2021  Dialysis Facility[facility]  MD: Rivera    PMHx: Problem List[1]   Labs:   Lab Results   Component Value Date/Time    POTASSIUM 4.4 12/06/2024 10:45 AM    POTASSIUM 4.8 11/08/2019 04:27 AM    PHOSPHORUS 3.4 08/23/2018 08:11 AM    GLUCOSE 258 (H) 12/06/2024 10:45 AM    GLUCOSE 126 (H) 11/08/2019 04:27 AM    HBA1C 6.5 (H) 02/28/2022 04:24 AM    HBA1C 5.5 11/06/2019 08:03 AM     Lab Results   Component Value Date/Time    SODIUM 128 (L) 12/06/2024 10:45 AM    SODIUM 136 11/08/2019 04:27 AM    MAGNESIUM 2.2 11/07/2019 01:28 AM    CALCIUM 9.7 12/06/2024 10:45 AM    CALCIUM 8.4 (L) 11/08/2019 04:27 AM    CHLORIDE 88 (L) 12/06/2024 10:45 AM    CHLORIDE 110 11/08/2019 04:27 AM    CO2 27.0 12/06/2024 10:45 AM    CO2 19 (L) " "11/08/2019 04:27 AM    BUN 31.0 (H) 12/06/2024 10:45 AM    BUN 41 (H) 11/08/2019 04:27 AM    CREATININE 5.90 (H) 12/06/2024 10:45 AM    CREATININE 2.95 (H) 11/08/2019 04:27 AM    BUNCREATRAT 5.3 (L) 12/06/2024 10:45 AM    ALBUMIN 4.6 12/06/2024 10:45 AM    ALBUMIN 3.6 07/14/2021 12:10 PM    ALBUMIN 2.8 (L) 11/08/2019 04:27 AM     No components found for: \"25-HYDROXY VITAMIN D 25\"    Medications: Encounter Medications with Dispense Information[2]   Diabetes Management:   Dx date: Unknown  Prescribing Provider: PCP Kevin Thakur  Meds: Insulin  A1c: 6.5% 2022  Glucose Monitoring: AM and PM, finger sticks  Skin: Normal    Diet & Social History     Nutrition Notes:   Diet/Restrictions: None   Appetite: Good  Meals: x2 meal w/ snacks occasionally    B: Fried eggs w/ coffee  D: Beans, meat w/ rice, fish, veggies, chicken soup  S: Fruit   F: Water, Sometimes Sprite  Supplements: Protein shake, on dialysis days   Dinning Out Frequency: Rarely   ETOH: No  GI: none    Lifestyle:   Energy Level: Good  Physical activity: Stationary bike, every other day   Activity restrictions: None   FRIED Frailty: Pt meets  Strength frailty criteria  Does patient smoke? non-smoker    Summary     Handouts Provided/Education Completed:   Kidney Transplant Nutrition Education    Sandra Rivera is a Good candidate for kidney transplantation from a nutrition perspective.   BMI: Body mass index is 25.06 kg/m².  A1c: 6.5% 2022  Malnutrition Status: Well nourished  FRIED Frailty: Intermediate         [1]   Patient Active Problem List  Diagnosis    Type 2 diabetes mellitus, with long-term current use of insulin (HCC)    Hypothyroidism    Corneal injury of left eye    Dyslipidemia    Hyponatremia    Elevated lipase    CKD stage 4 due to type 2 diabetes mellitus (HCC)    Anemia   [2]   Current Outpatient Medications   Medication Sig Refill Last Dispense    atorvastatin (LIPITOR) 40 MG Tab Take 1 Tab by mouth every evening. 1 Unknown " (outside pharmacy)    calcium carbonate (OS-ERICH 500) 1250 (500 Ca) MG Tab Take 500 mg by mouth every day.  Unknown (patient-reported)    carvedilol (COREG) 25 MG Tab Take 25 mg by mouth 2 times a day, with meals.  Unknown (patient-reported)    ferrous sulfate 325 (65 Fe) MG tablet Take 325 mg by mouth 3 times a day. (Patient taking differently: Take 325 mg by mouth 2 times a day.)  Unknown (patient-reported)    insulin detemir (LEVEMIR FLEXTOUCH) 100 UNIT/ML Solution Pen-injector injection Inject 30 Units as instructed every bedtime.  Unknown (patient-reported)    insulin lispro, Human, (HUMALOG KWIKPEN) 100 UNIT/ML Solution Pen-injector injection Inject 2-10 Units as instructed 4 Times a Day,Before Meals and at Bedtime. (Patient not taking: Reported on 6/26/2025) 2 Unknown (outside pharmacy)    levothyroxine (SYNTHROID) 112 MCG Tab Take 112 mcg by mouth Every morning on an empty stomach.  Unknown (patient-reported)    lisinopril (PRINIVIL) 40 MG tablet Take 1 Tab by mouth every day. For blood pressure control 0 Unknown (outside pharmacy)    pravastatin (PRAVACHOL) 10 MG Tab Take 10 mg by mouth every evening. (Patient not taking: Reported on 6/26/2025)  Unknown (patient-reported)    SITagliptin (JANUVIA) 50 MG Tab Take 1 Tab by mouth every day. (Patient not taking: Reported on 6/26/2025) 3 Unknown (outside pharmacy)    torsemide (DEMADEX) 20 MG Tab Take 1 Tab by mouth 2 Times a Day. For blood pressure control 3 Unknown (outside pharmacy)    vitamin D (CHOLECALCIFEROL) 1000 UNIT Tab Take 2,000 Units by mouth every day.  Unknown (patient-reported)
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
## 2025-06-26 NOTE — PROGRESS NOTES
Kidney Transplant 101 - Education  25    Sandra Pretty Santino Whipplegas presented to [facility] today for their education session and initial consultations regarding kidney transplantation. As part of this visit, they received comprehensive information through our “Kidney Transplant 101” program, which is designed to help patients and their support systems understand the transplant process, risks, benefits, and alternatives. The summary below outlines the you topics discussed to support informed decision-making before proceeding with the full evaluation.    Kidney Transplant as a Treatment Option   A kidney transplant is a surgical procedure in which a failing or diseased kidney is replaced with a healthy kidney from a donor. It is typically considered when kidney disease continues to progress despite other treatments, such as dietary changes, medications, or dialysis. For many people with advanced kidney disease, transplant can offer a better quality of life, freedom from dialysis, and improved long-term health.     There are two types of kidney transplant:     A  donor transplant, where the kidney comes from someone who has passed away and they or their family have gifted their organs for donation.   A living donor transplant, where the kidney is donated by a healthy person--often a family member, friend, or even a stranger--who is willing and able to donate one of their kidneys.     [facility] is proud to offer both living and  donor transplant options, giving patients access to the full range of lifesaving care. Both options can be life-changing, and our team will work closely with you to determine which path is the best fit for your individual needs and circumstances.     The Kidney Transplant Team at [facility]   The [facility] was launched to expand access to transplant-related care in [facility] and the surrounding  regions. Our team brings decades of collective experience in transplantation and is committed to providing high-quality, patient-centered care throughout the transplant journey. Our multidisciplinary team includes physicians (nephrologists or kidney doctors), surgeons, transplant nurse coordinators, social workers, registered dietitians, pharmacists and even financial coordinators--all with specialized knowledge of kidney transplant care. We also collaborate with other specialists, such as cardiologists and hepatologists, to ensure your care is tailored to your unique medical needs.     Behavior and Communication   At [facility] [place], you are the most important member of your transplant care team. Your voice, values, and participation are essential to achieving the best possible outcomes. We are here to support you, answer your questions, and work alongside you throughout every step of this journey. In return, we ask that you maintain respectful and open communication with all members of the transplant team. Transplant care is a shared effort, and it requires trust, honesty, and a willingness to work together. Disruptive behavior or an unwillingness to engage constructively may affect your ability to continue in the transplant process or remain eligible for transplant at our program. We are committed to creating a safe, collaborative, and compassionate environment--for you and for every patient we serve.     Evaluation for Kidney Transplant Candidacy   To determine whether a kidney transplant is the best possible option for your individual health, well-being, and long-term quality of life, you will need to complete a comprehensive evaluation process. This evaluation includes--but is not limited to--consultations with various members of the transplant team, a physical examination, a psychosocial assessment, a financial screening, and a range of laboratory and diagnostic tests. The specific tests required  will depend on your medical history, type of kidney disease, and age. Common tests may include:     Bloodwork, including screening for infectious diseases such as HIV and Hepatitis   Chest X-ray   EKG and echocardiogram    Cardiac stress test and cardiac catheterization   Vascular imaging (e.g., CT scans)   Age and gender appropriate cancer screenings (such as a mammogram and colonoscopy)     Additional tests or specialty consultations may be needed based on your individual background and health. Throughout the evaluation process, you will also be asked to participate in mandatory transplant education sessions to help prepare you for the journey ahead.     Establishing a Care Plan and Support System   The transplant process can be complex and, at times, overwhelming. It’s important to have a clear care plan and a reliable support system in place. This may include a family member, friend, or other caregiver who can assist you before and after surgery. Our Transplant Social Workers will work with you to build this plan and help identify resources and supports that can assist you along the way. Please note that the inability to establish a care plan or identify adequate support may affect your eligibility for transplant.     Benefits and Risks of the Evaluation Process   The primary benefit of undergoing a kidney transplant evaluation is gaining a clear understanding of whether transplantation is the best possible option for your individual health, well-being, and long-term quality of life. The evaluation process also helps identify any potential medical, psychosocial, or financial barriers that need to be addressed to ensure a successful transplant outcome. The evaluation process is thorough and may uncover previously unknown medical conditions, such as heart disease, infections, or cancers, which could require additional testing, treatment, or specialty care. These findings may delay your transplant candidacy or, in  some cases, lead to a decision that transplant is not currently a safe option. The evaluation can also be emotionally and physically demanding. You may experience anxiety or stress as you wait for results or navigate new diagnoses. Additionally, while many aspects of the evaluation may be covered by insurance, there may be out-of-pocket costs depending on your coverage, including co-pays, deductibles, or travel expenses. Our transplant team is here to support you throughout the process--providing education, answering your questions, and helping you understand any findings that arise.     Patient Selection   After you complete the transplant evaluation process, the results of your consultations, tests, and procedures will be carefully reviewed and compared to our established Patient Selection Criteria. Your Transplant Coordinator can provide you with a copy of these criteria at any time upon request. This review helps determine whether kidney transplantation is currently the most beneficial treatment option for your overall health and well-being. It also helps identify whether additional care or support may be needed and whether you are eligible to be listed on the national Organ Procurement and Transplant Network (OPTN) waiting list.     The outcome of this comprehensive review will be one of the following three decisions, made by the Transplant Selection Committee:   Approved as a transplant candidate: You meet the Selection Criteria, and kidney transplant is considered a beneficial and appropriate treatment option for you at this time. This means the Committee believes you are medically, surgically, and psychosocially prepared to be listed for transplant. If you choose to move forward, you will be added to the Organ Procurement and Transplant Network (OPTN) kidney transplant waiting list, and our team will continue to support you with education and ongoing care throughout the waiting period.   Deferred: The  Selection Committee needs additional information before making a final decision. This may include further testing, specialty consultations, or completion of recommended treatments. Being deferred does not mean you are ineligible--it simply means more information is needed to ensure that transplant is a safe and appropriate option for you. Your Transplant Coordinator will help guide you through the next steps.   Not Approved as a transplant candidate: Based on your current medical condition, surgical risk, or psychosocial concerns, you do not meet the Selection Criteria at this time. The decision to decline a patient for transplant is never taken lightly and is always based on what is safest and most appropriate for the individual. Common reasons someone may not be approved as a transplant candidate include having uncontrolled or severe medical conditions--such as advanced heart or lung disease, active infections, or cancer--that would significantly increase the risk of surgery or poor outcomes after transplant. Other factors may include active substance use, an inability to follow medical recommendations, or a lack of a stable support system. In some cases, these concerns can be addressed over time, and patients may be re-evaluated in the future.     You will be informed of the Committee’s decision, along with any recommendations or next steps.      It's important to understand that being approved and added to the transplant waiting list does not guarantee that you will remain on the list or receive a transplant. Continued eligibility depends on maintaining medical stability, ongoing engagement with your care plan, and meeting all necessary requirements over time.  Additionally, if you decide not to pursue a kidney transplant, or if it turns out that transplant is not the right option for you, we will do our best to support you in finding alternative care. This may include helping you get referrals to other  specialists or resources, or your kidney doctor can help refer you to another transplant center if you would like a second opinion.     Understanding the Listing Process   If the Selection Committee determines that a kidney transplant is the best possible option for your individual health, well-being, and long-term quality of life--and that you meet the Patient Selection Criteria--you will be added to the national kidney transplant waiting list. If you were already on dialysis before being listed, you will receive credit for the time you’ve been on dialysis. If you have not yet started dialysis, your waiting time begins on the date you were added to the list with documented kidney function (eGFR or creatinine clearance) at or below 20.     About eGFR and Fairness in Waiting Time   Your glomerular filtration rate (GFR) is a measure of how well your kidneys are filtering waste from your blood. It helps doctors understand how advanced your kidney disease is and whether you're eligible for a kidney transplant. Recent research has shown that older methods of calculating eGFR, especially those that factored in race, may have unintentionally delayed access to transplant for Black or  patients. To help correct this, a new policy was adopted in 2023 that requires all transplant programs to use race-neutral eGFR calculations.     If you identify as Black or , you may be eligible for a waiting time adjustment under this policy. In order to request this adjustment, we need a lab result that includes both your creatinine level and an eGFR calculated with race included (over 20), along with the same lab recalculated using a race-neutral formula (20 or below). Our team will look for these labs during your evaluation, and if you're eligible, we’ll automatically submit a request on your behalf once you are added to the list. If you do not identify as Black or , you are not eligible for  a waiting time modification under this specific policy. However, if you have any questions about this or are unsure whether it applies to you, please contact your Transplant Coordinator--we’re here to help.     The Wait   The amount of time you may wait for a  donor kidney can vary depending on many factors. While the average wait time ranges between 3 and 10 years, [facility] [place] is a new program in [facility], and our waiting times may differ. Because we are newly activated, we anticipate shorter wait times in the early phases of our program. However, how long you wait will still depend on:     Your blood type   How sensitive your immune system is   How long you have been on dialysis and/or how long you’ve been on the waiting list   Donor availability      Staying Connected While You Wait   After you’re added to the waiting list, our team will stay in touch with you. From time to time, we’ll ask you to update labs, imaging, or come in for follow-up visits to ensure you remain healthy and ready when a kidney becomes available. It’s very important that we can reach you quickly if a kidney offer comes. Please make sure your voicemail is set up and let us know right away if any of the following change:     Your address or phone number   Your insurance coverage   Your dialysis status or dialysis center   Your caregiver or support system   Any new hospitalizations, diagnoses, or treatments     Staying connected helps ensure you remain eligible and ready--giving you the best chance at receiving a transplant when the opportunity arises.     Changes to Your Listing Status   Again, it’s important to understand that being approved and added to the transplant waiting list does not guarantee that you will remain on the list or receive a transplant. Continued eligibility depends on maintaining medical stability, staying engaged in your care, and completing all necessary testing and follow-up  appointments over time. There may be times when receiving a transplant is temporarily not safe, appropriate, or possible. For example, this could happen if:     You are traveling or temporarily unavailable (e.g., on vacation)   You develop a new medical condition that needs further evaluation or treatment   You experience a change in insurance or support system that affects your readiness for transplant     In these situations, your status on the list may be changed to “inactive” (also called “Status 7”). While inactive, you will continue to accrue waiting time, but you will not receive or be considered for organ offers. If you are made inactive for reasons beyond your control, our team will work with you to resolve any issues so we can safely reactivate you when you're ready.     In some cases, over time, a patient’s medical condition, personal circumstances, or overall transplant readiness may change in ways that make kidney transplant no longer the best or safest treatment option. If this occurs, your care team may determine that it is appropriate to “delist” you from the transplant waiting list--meaning you would no longer be eligible to receive kidney offers through our program, and your waiting time would stop accruing. We understand this can be difficult news, and we want to assure you that any decision to delist is made with great care and always with your health, safety, and long-term well-being in mind. If you are delisted at [facility] [place] but would like to explore the possibility of transplant at another center, we will gladly provide your medical records upon request. Each transplant program has its own patient selection criteria, and you may still be eligible for evaluation elsewhere if you would like to pursue a second opinion.     Additionally, if at any point you decide that transplant is not the right path for you, we respect that choice. We will do our best to support you with  alternative care--whether that means helping you connect with other specialists, arranging referrals, or collaborating with your nephrologist to explore other treatment options.     You will be notified in writing within 10 calendar days of any changes to your status on the waiting list.     Multiple Listing and Wait Time Transfers   Multiple listing means being registered for a kidney transplant at more than one transplant center. Each center must evaluate you separately and accept you as a candidate based on their own criteria. In some cases, especially if you’re listed at centers in different regions or states, multiple listing may improve your chances of receiving a transplant sooner by giving you access to a larger pool of potential donors. However, listing at multiple centers within the same geographic area--such as [facility] and another program in [facility] or [place]--is unlikely to offer additional benefit, since organ allocation typically prioritizes candidates closer to the donor hospital. Before pursuing multiple listing, it’s important to consider several factors. Not all insurance plans will cover evaluations or transplant care at an out-of-area center, and you may need to cover travel, lodging, and follow-up expenses yourself. Additionally, each transplant program will require up-to-date lab work, documentation of your dialysis start date or qualifying kidney function, and accurate contact information. Our team can help you understand what’s involved and assist with submitting records if you choose to explore this option.     Your waiting time for a kidney transplant is typically based on the date you started dialysis or the date your kidney function dropped to a level (eGFR or creatinine clearance of 20 or below) that qualifies you for listing. This waiting time applies to all centers where you are listed, as long as each one has the correct documentation. If you decide to  stop pursuing transplant at one center and transfer to another, OPTN policy allows you to move your full waiting time to the new program. However, you are not allowed to split your waiting time between centers or combine time across listings. Coordination between both centers is essential to make sure the transfer happens accurately.     If you're interested in multiple listing, [facility] [place] is happy to support you. We can help guide you through the process, communicate with other centers, and make sure your records are shared in a timely and accurate manner. Your care team is here to help ensure that whatever path you choose is informed, supported, and centered around your best chance for a successful transplant.     Receiving an Organ Offer   Once you are listed for a kidney transplant, you may be contacted at any time--day or night--when a potential donor organ becomes available. When that happens, we must be able to reach you within one hour to discuss the offer and determine whether it's right for you. Because of this, it’s absolutely essential that you keep your contact information up to date. If your phone number, address, or availability changes, please let your transplant coordinator know immediately. If we are unable to reach you in time, you could miss the opportunity for a transplant. When a potential organ becomes available, an on-call transplant coordinator will contact you and ask a few questions about your current health and how you're feeling. They will also share information about the donor organ, including any known risk factors that might affect your health or the success of the transplant. It's very important to answer these questions honestly and completely--even if you're unsure or have concerns. Your transparency helps us make the safest and most appropriate recommendation for you.     Our team will only offer you an organ if we believe the potential benefits outweigh the  risks. However, the final decision is always yours. We want you to feel informed and empowered to ask questions and make the choice that is right for you in that moment. You always have the right to decline an organ offer, and doing so will not affect your place on the waiting list or your ability to continue receiving care with us. It’s also helpful to know that it may take multiple offers before you receive a transplant. Being prepared, reachable, and informed helps ensure that when the right kidney becomes available, you’re ready to make a timely decision.     Understanding Organ Offers: Donor Risk Factors, Hepatitis C, and KDPI Scores   After you receive a call about a potential organ offer, your transplant coordinator will share important details about the donor organ--including any medical or behavioral risk factors that might affect your health or the success of your transplant. Our goal is to help you make an informed decision in real time, based on the best available information and what matters most to you.     Organ Donors with Identified Social and Behavioral Risk Factors   All potential organ donors undergo a thorough medical screening before their organs are offered for transplant. This includes lab testing, imaging, and a review of their medical and social history. Donors are screened for infections such as Cytomegalovirus (CMV), Selin-Barr virus (EBV), HIV, Hepatitis B and C (HBV, HCV), as well as certain cancers or other conditions that could impact the safety of the transplant. Despite these precautions, there is always a small chance that a condition could go undetected--especially if the donor was recently exposed to an infection.     All potential organ donors are also screened for and may meet the Public Health Service (PHS) risk criteria, which identify behaviors in the 30 days prior to donation that may slightly increase the chance of exposure to HIV, HBV or HCV, and new undetectable  infection. These include:     Having sex (i.e., any method of sexual contact, including vaginal, anal, and oral) with a person known or suspected to have HIV, HBV, or HCV    Male donors who have had sex with men   Having sex in exchange for money or drugs or having sex with a person who had sex in exchange for money or drugs   Injection drug use for non-medical reasons or having sex with a person who injected drugs for non-medical reasons   Recent confinement in senior care, skilled nursing, or juvenile correction facility for >=72 consecutive hours   Pediatric donors born to or  by a mother with HIV, HBV, or HCV infection   Potential donors with an unknown medical or social history     These risk factors are not related to the quality or function of the kidney, and many kidneys from donors with these risk factors have excellent outcomes. In fact, accepting an organ from a donor with risk factors may reduce your waiting time and improve survival compared to staying on dialysis longer. The overall risk of disease transmission is very low, and if any transmission were to occur, effective treatments are available. After your transplant, we will monitor you very closely to make sure your new kidney is working well and to check for any signs of infection or disease transmission. This includes regular lab testing, physical exams, and follow-up visits. If any issues are detected early--such as a virus or infection passed from the donor--we can begin treatment right away. Our goal is to catch and manage any complications as quickly as possible to protect your health and ensure the best possible outcome.     Organ Donors with Hepatitis C (HCV) Infection   Hepatitis C (HCV) is a common viral infection in the [place]. Thanks to major advances in antiviral medications, kidneys from donors with HCV--previously not able to be transplanted--are now used safely and successfully at transplant centers across the country including here  at [facility]. These organs are often healthy and function just as well as those from donors without Hepatitis C.     There are two types of HCV-positive donors, based on special lab testing called nucleic acid testing (THELMA):     THELMA Positive (THELMA+) means the donor has an active Hepatitis C infection at the time of donation. If you receive a kidney from a THELMA+ donor, you will also become infected with HCV--but we will treat the infection after transplant using antiviral medications. These treatments are highly effective, with cure rates of over 95%.   THELMA Negative (THELMA-) means the donor had a history of Hepatitis C exposure but no current active infection at the time of donation. In most cases, these organs do not result in transmission of the virus. However, there is still a small chance that HCV could be transmitted--especially if the donor had a very recent exposure that was not yet detected by testing. If that happens, we will identify it through our routine post-transplant monitoring and begin treatment immediately. As with THELMA+ organs, antiviral therapy is highly effective.     At [facility], we offer both THELMA+ and THELMA- HCV donor organs when appropriate, in order to expand your opportunities for transplant and reduce your waiting time. If you receive one of these organ offers, we will take the time to explain all potential risks and benefits, answer your questions, and make sure you feel fully informed and supported. If a kidney from an HCV-positive donor is a good fit for you, we’ll work with your insurance to get coverage for any necessary treatment. Like with other transplant medications, there may be an out-of-pocket co-pay depending on your plan, and we will discuss this with you ahead of time. Importantly, you are never required to accept a kidney from a donor with Hepatitis C, and your place on the waiting list will not be affected if you choose to decline. Our job is  to help you make the decision that is right for you--based on your goals, your values, and your comfort.     Organ Donors with a High KDPI Score   Every kidney offered for transplant receives a score called the Kidney Donor Profile Index (KDPI). This score ranges from 0% to 100% and helps estimate how long the kidney is expected to function compared to other donor kidneys. A lower KDPI score suggests the kidney is likely to last longer, while a higher KDPI score may indicate a shorter expected lifespan.     For example:   A KDPI of 20% means the kidney is expected to last longer than 80% of other donor kidneys.   A KDPI of 60% means it’s expected to last longer than 40% of other donor kidneys.     Kidneys with a KDPI of 85% or higher are generally referred to as “high KDPI” kidneys. These kidneys may not function as long as lower KDPI kidneys, but they can still be a lifesaving option, especially for individuals who:     Have difficulty tolerating dialysis for longer periods   Do not have a living donor   Have other medical conditions that make it riskier to remain on dialysis or wait for a lower KDPI kidney. In these cases, accepting a higher KDPI kidney now may provide a safer path to better health and quality of life than continuing to wait.     To find the right match quickly, high KDPI kidneys are offered across a wider geographic area than lower KDPI kidneys. Choosing to accept a high KDPI kidney may help you get transplanted sooner, which can be an important advantage if your health makes waiting more difficult. However, these kidneys may not last as long, and there is a higher chance that you may need dialysis again or possibly another transplant in the future. You are never required to accept a high KDPI kidney, and your care or status on the waiting list will not be affected if you decline. If you're offered one, we’ll talk you through what it means, answer any questions, and help you decide if it’s the  right fit for your situation.     Living Donor Transplant   One of the most effective ways to reduce your waiting time for a kidney transplant is through living donation. A living donor is a healthy person who chooses to donate one of their kidneys so that someone else--like you--can have a second chance at life. Living donors can be family members, friends, coworkers, or even someone you don’t know personally. The most important factor is that the donor is in good physical and emotional health and is willing to go through a full evaluation process. Just like you will be thoroughly evaluated to ensure that transplant is safe and appropriate, potential donors will go through their own confidential medical and psychosocial evaluation. This includes lab work, imaging, consultations, and education about the donation process. The purpose is to ensure that donation is safe for them and that they are fully informed and supported every step of the way. If you move forward with a living donor transplant and your donor is approved, the surgery can be scheduled in advance at a time that works for both of you. Unlike  donor transplants, which can happen with little notice, living donor surgeries are planned--giving you time to prepare, arrange time off, and organize your support system. Your transplant team will work closely with you and your donor to ensure everything is coordinated for a safe and smooth experience.     In addition to shortening your wait for a transplant, kidneys from living donors offer several important medical benefits. Living donor kidneys usually begin working right away after surgery, whereas kidneys from  donors may take longer to function. They also tend to function better overall and last significantly longer--often 15 to 20 years or more, compared to 10 to 15 years for  donor kidneys. Because the surgery is planned and the kidney comes from a healthy, carefully screened  donor, there is a lower risk of complications during and after transplant. Studies have shown that recipients of living donor kidneys often experience better long-term health outcomes, including improved survival and quality of life. For many patients, a living donor transplant can mean not only getting off dialysis sooner but also enjoying more years of stable kidney function.     If you have friends, family, or community members who are interested in learning more about living donation, they can contact our Living Donor Team at [phone]. We’ll walk them through the process with care, privacy, and respect, and there is no obligation to proceed after the initial inquiry. Living donation is a generous act, and we’re here to make sure it’s a safe and well-informed one--for everyone involved.     Kidney Transplant Surgery   Whether your kidney transplant is from a living donor or a  donor, your care team will work closely with you to prepare for surgery. When a kidney becomes available, you will be admitted to the hospital. In most cases, you will undergo some final lab work or diagnostic imaging before surgery to confirm that it is safe to proceed. Your transplant team will review the results, discuss any last-minute details or risks, and answer any remaining questions you may have. Once you feel ready and all concerns have been addressed, you will be asked to sign a surgical consent form and then you will be prepped for surgery. In the operating room, you will be placed under general anesthesia--this means you will be fully asleep, unable to feel pain, and carefully monitored throughout the procedure. Once you are asleep, your transplant surgeon will make an incision-- usually in your lower abdomen--and place the new kidney into your body. In most cases, your own kidneys will not be removed, unless there is a specific medical reason to do so.     The total time you’ll spend in the operating room is  usually 4 to 8 hours, though the transplant surgery itself typically takes about 2 to 4 hours. The rest of the time is used for preparation, positioning, and anesthesia. After surgery, you’ll be taken to a recovery area where your care team will monitor you closely as you wake up and begin your healing process.     After Kidney Transplant Surgery   Most people stay in the hospital for about 3 to 5 days after their kidney transplant. Some patients may need to stay longer, depending on how sick they were before surgery, how quickly they recover, and whether any complications occur. During your hospital stay, the transplant team will begin teaching you how to care for your new kidney, review your medications, and prepare you for the next phase of recovery. As you get closer to going home, our team will work closely with you to develop a personalized discharge plan. This includes arranging any home care services you may need, reviewing your medications, scheduling follow-up appointments, and making sure you and your caregivers feel confident in your post-transplant care. Before you leave the hospital, we’ll ensure you have clear written instructions and know how to contact us with any questions or concerns.     If you live outside the [facility], you will need to stay locally for at least 2 to 3 weeks after discharge--sometimes longer. This allows us to monitor how well your new kidney is working, check medication levels, and watch for any early signs of infection or rejection. You will need to have labs draw frequently and come to transplant clinic at least twice a week for the first month, once a week for the second month, and then at least monthly as you continue to recover. Patients who develop complications may need to stay nearby longer or be seen more often by the team. Over time, some of your care will transition back to your primary kidney doctor (nephrologist), but the transplant team at [facility] will continue to  follow you for life. Long-term follow-up includes routine lab work, imaging, and clinic visits to monitor your kidney function and overall health. If you live far from [place] or outside of [facility], we can coordinate with a local nephrologist to help manage your ongoing care once it is safe for you to return home.     Medication Adherence   After your kidney transplant, you will need to take immunosuppressant medications every day for the rest of your life. These medications help protect your new kidney by preventing your immune system from attacking it, a process called rejection. Even though you may start to feel better after surgery, it is critical that you continue taking these medications exactly as prescribed. Missing doses, stopping your medications, or changing them on your own puts your transplant at serious risk. If your body begins to reject the new kidney, the damage may be permanent. This could lead to losing the kidney, needing to go back on dialysis, or even facing life-threatening complications. Some of these medications can be expensive depending on your insurance coverage. If you ever have trouble affording your medications or are worried about cost, please reach out to your transplant team right away. There may be financial assistance programs or other resources available to help you. We’re here to support you--keeping your new kidney healthy is a team effort, and taking your medications correctly is one of the most important things you can do.     Benefits and Risks of Kidney Transplant as a Treatment Option   Kidney transplant offers many important benefits. For people with advanced kidney disease, a successful transplant can lead to a longer life, improved overall health, and freedom from dialysis. Many patients report a significant boost in their quality of life, including more energy, strength, and a renewed sense of well-being. Transplant can also make it easier to return to work, enjoy  time with loved ones, and regain a sense of independence and normalcy. However, like all major surgeries, kidney transplant comes with risks--both in the short term and long term. Understanding these risks will help you make informed decisions and be better prepared for the transplant journey.     Short-Term Risks   Immediately after surgery, there are medical and surgical risks that include, but are not limited to:     Nausea and vomiting   Pain or discomfort   Bleeding or blood clots   Infections   Reactions to anesthesia or medications   Nerve damage   Injury to surrounding blood vessels or abdominal structures   Bladder or ureter complications   Pressure sores or scarring   Stroke, irregular heart rhythms, or cardiovascular collapse   Multi-organ failure   Rejection of the new kidney   Delayed kidney function (where the kidney takes time to start working)   The need for additional surgeries, including re-operation or re-transplant   In rare cases, death     Your transplant team will monitor you closely during this period and take every precaution to manage these risks.     Long-Term Risks   While transplant offers the potential for a longer and healthier life, it also comes with lifelong responsibilities, particularly related to the medications you’ll need to take to prevent rejection of your new kidney. These immunosuppressant medications lower your immune system so it does not attack the new kidney. You’ll need to take them every day for life. They are essential--but they also come with long-term risks and side effects, including:     Increased risk of infections, due to a weakened immune system   Weight gain, often related to steroid use   High blood pressure and high cholesterol, increasing your risk of heart disease   New-onset diabetes, particularly in people already at risk   Bone thinning (osteoporosis), which can increase the risk of fractures   Kidney toxicity, meaning the very medications that protect  your kidney can sometimes affect kidney function   Higher risk of certain cancers, especially skin cancer and lymphoma     We will monitor you regularly through lab work, imaging, and clinic visits to catch any issues early. You can also help lower your risk by staying active, eating a healthy diet, wearing sun protection, and taking all medications exactly as prescribed.     Emotional and Psychological Considerations   In addition to physical health risks, there are also emotional and psychological challenges that can arise after transplant. These may include depression, post-traumatic stress disorder, generalized anxiety, anxiety regarding dependence on others, issues with body image and feelings of guilt. We understand that transplant is not just a medical journey--it’s an emotional one too. Our social workers and care team are here to support you through any difficulties you may face. While we will monitor you closely, it's essential that you also communicate openly with us and report any symptoms, concerns, or changes in your physical or emotional well-being. Even with these risks, many people go on to live long, fulfilling lives after transplant. Staying engaged in your care, attending follow-up visits, and taking your medications as prescribed are some of the most important things you can do to protect your new kidney and your overall health.     Financial Considerations   The cost of kidney transplant--including the evaluation process, surgery, hospital stay, and ongoing care--can vary depending on your individual medical needs and insurance coverage. Whether you are insured or paying out-of-pocket, you must have a financial plan in place to cover the entire transplant process. This includes:     The evaluation and testing required before transplant   The surgery itself and hospital stay   Follow-up care after transplant   Lifelong medications to help prevent rejection of your new kidney     In addition to  medical costs, it’s also important to consider non-medical expenses that may arise, such as short-term housing (especially if you live far from [place]), transportation, childcare, and lost income for you or your caregivers during recovery. Your current insurance may not cover all health issues that come up after transplant, and some people have found that applying for future health, disability, or life insurance can be more difficult. While this doesn’t happen to everyone, it’s important to be aware of the possibility as you plan ahead. To help you navigate all of this, our  and transplant social workers are here to support you. Please keep them updated about your insurance status and any changes in your financial situation. If you're having trouble getting coverage or are worried about costs, let us know--we can help you explore available resources and programs that may offer assistance.     Right to Refuse Transplant   At [facility], we believe that you are the most important voice in your care. You have the right to stop the evaluation or choose not to move forward with kidney transplant at any time. This is your decision, and we will always respect your choice. If you decide not to continue with the evaluation process, kidney transplant will no longer be a treatment option through our program. However, that does not mean you are out of options. We will work with you--and your kidney doctor (nephrologist)--to explore other treatments that may be a better fit for your needs, values, and goals.     Exploring Other Treatment Options   Kidney transplant is one option for treating advanced kidney disease, but it may not be the right choice for everyone. The main alternative is medical management, which includes treatments like hemodialysis or peritoneal dialysis. These therapies help remove extra fluid, waste, and toxins from your blood when your kidneys can no longer do so. Some  people also choose to focus on comfort-based care, without pursuing advanced treatments like dialysis or transplant.     If you decide that kidney transplant is not right for you--or if it is determined that you are not a candidate--we will continue to support you. Our team will make every effort to help you access the care, resources, and referrals you may need. This may include working with your primary nephrologist to coordinate dialysis care, connect you with support services, or refer you to another transplant center if you’re interested in getting a second opinion. Our priority is to ensure you feel informed, supported, and respected--no matter which path you choose.     CMS and Medicare Beneficiary Information   The Centers for Medicare & Medicaid Services (CMS) is a federal agency that ensures transplant programs provide safe, high-quality care. When a transplant center obtains CMS certification, it means the center has met the strict standards set by the federal government. These standards cover everything, from having experienced and well-trained staff to using proper equipment and maintaining a safe and clean facility. The transplant center must have clear procedures in place to care for patients before, during, and after surgery. CMS keeps detailed records of patient outcomes and regularly inspects healthcare facilities to make sure they are in compliance with its regulations and requirements.     Transplant programs are required to perform a specific number of procedures to be eligible for CMS certification. As [facility] is a new program, we are still working hard to meet that threshold. Rest assured our team is highly experienced and dedicated; we are optimistic that we will achieve CMS certification within the next year. In order to receive any transplant related Medicare benefits that you may be entitled to including payment for post-transplant immunosuppressive drugs covered under  Medicare Part B, you must receive your transplant at a transplant program that is approved by CMS. If you would prefer to be evaluated and transplanted at a Medicare approved program, we will help you transfer your care.      Outcomes at [facility], Publicly Available Data   All transplant centers are required to submit data on their outcomes (statistics showing how well our patients do after transplant) to the OPTN. This data is then analyzed and compared to other transplant centers nationally by the Scientific Registry of Transplant Recipients (SRTR). The SRTR releases two reports on transplant center outcomes each year, typically in July and January. This information may be helpful to you in making an informed decision about which transplant centers you want to be evaluated by or listed with. You can view the data on all transplant programs' performance by visiting www.srtr.org.    As a new transplant program, [facility] has not yet accumulated the data necessary for publicly available outcomes on the SRTR report. However, we are dedicated to providing the highest quality of care and continually monitor and assess our outcomes to ensure the best results for our patients. If new data becomes available during your evaluation process or after you are approved as a candidate awaiting transplant, we will send a letter with this new information for your review.  If you have any questions or concerns about the SRTR information, contact your transplant coordinator.     Program Coverage Plan   [facility]’s administrative offices and clinic are open Monday through Friday from 8 am - 4:30 pm and members of our team can be reached at [phone]. For after hours, please call the main hospital at [phone] and ask to speak to the on-call kidney transplant coordinator. We have continuous medical and surgical coverage (our program coverage plan) for transplant patients; a  transplant surgeon, physician, and coordinator are on call at all times; available 24 hours a day, 7 days a week, 365 days a year. You will be notified in writing if we have any significant changes to our program, including a change in resources, or if our key surgeons or physicians encounter any significant changes that may impact our ability to provide transplant related services. You will also be notified if we are ever unable to provide transplant related services for a period of 15 or more days in a row or for 28 or more days in any calendar year. A copy of the full program coverage plan is available to you upon request.     Illegal Sale of Organs   The sale or purchase of human organs is a federal crime and it is unlawful for any person to knowingly acquire, receive, or otherwise transfer any human organ for valuable consideration for use in human transplantation.     Confidentiality   All information obtained in connection with this evaluation, will remain as confidential as possible within the requirements of state and federal law, however, we are required to provide certain identifiable information to members of the Food and Drug Administration (FDA), UNOS, the SRTR and CMS routinely and during mandatory audits. Your insurance provider may also request and obtain copies of your records. Additionally, we may use some of your generalized information, without any identifying factors, for the purpose of research and publication.     Questions, Comments or Concerns   For questions or concerns about your transplant related care, please contact the kidney transplant program at [phone]. If you have concerns about the care you are receiving with the transplant program you can contact [facility]’s Service Excellence Team at [phone]. Additionally, the United Network for Organ Sharing (UNOS) offers a toll-free service number [phone]) [phone] regarding any questions you might have about the organ allocation  practices and the transplantation system in general. You may also call this number to discuss a problem you may be experiencing with the transplant center or the transplantation system in general.     Proceeding with Kidney Transplant Evaluation  Sandra Sukhwinder Rivera acknowledges that the transplant team has provided educational materials, which were reviewed and discussed in detail today. They were given the opportunity to ask questions and received responses that they felt were clear and satisfactory. They understand that the transplant team remains available to answer any future questions and recognizes the importance of maintaining open and honest communication throughout the process.     After receiving this information, Sandra DOES wish to proceed with the evaluation for kidney transplantation at [facility]. They understand that choosing to move forward with the evaluation does not guarantee approval as a transplant candidate, nor does it guarantee that a kidney transplant will take place if approved.       Zackery Singh R.N.  Transplant Coordinator  [facility]

## 2025-06-26 NOTE — PROGRESS NOTES
Kidney Transplant Recipient Adult Psychosocial Assessment     Transplant Social Work - Candidacy     Psychosocial Suitability: Patient presents as Acceptable candidate for a kidney transplant at this time. Based on psychosocial risk factors, patient presents as Standard  for any post transplant psychosocial concerns.       SIPAT score: 5    0 - 6 Excellent candidate    Recommend to list without reservations.    Recommendations/Additional Comments: None     Patient information:     Sandra Rivera is a 75 y.o. female who presents for a psychosocial evaluation for a kidney transplant.  Her daughter, Heidy Pierce was also present as well as Heidy's 9 year old grandson, Gildardo.  Her primary cause of kidney failure is Type 2 diabetes.  Patient has been on [facility] at [facility] since 2021.    Cell: [phone]    During the psychosocial interview today, patient was asked about their race/ethnicity and citizenship status due to the requirement by [facility] to collect citizenship status data on all transplant candidates, and  donors. The patient was informed that their citizenship status will not impact their access for transplant.     Primary Language: Telugu (Verbally)   Needed: yes - Jeffrey [id number]     Marital history:  to Keith for 52 years      Emergency Contact:    Name: Heidy Pierce  Relationship: daughter    Phone Numbers: [phone] (cell)  Family/Social Support: Number of dependents/childcare: Patient lives with her , Keith.  Her daughter, Heidy and her  live in the area as well as Heidy's two adult children.  Patient's daughter, Mari also lives in the area and daughter Jennifer lives in [place].    Living Will: no    Healthcare Power of : no    Advance Directives on file: Verbally reviewed LW/HCPA information.  provided patient with copy of LW/HCPA documents and provided education on completion of forms.      Education/Employment History/Insurance:     Highest Education Level:   school in [place].  Patient stated 3rd grade in high school.    Reading Ability: high school       Learns Best By: Verbal (explanation)      Status: No      VA Benefits: no     Working for Income: is not working due to the current complaint     Spouse/Significant Other Employment: Keith works full-time in maintenance.     Disabled: yes -      Household Composition and additional others who can assist with transplant:      Name: Keith Oliver  Age: 81    Relationship:     Does person drive? yes -      Sandra does not drive.  Her daughter, Heidy drives as well.    PRIMARY CAREGIVER: Heidy Pierce will be primary caregiver, phone number [phone].    Do you and your caregivers have access to reliable transportation? yes - Heidy and Keith both drive.      provided in-depth information to patient and caregiver regarding pre- and post-transplant caregiver role.  strongly encourages patient and caregiver to have concrete plan regarding post-transplant care giving, including back-up caregiver(s) to ensure post transplant needs are met as needed. Strongly encouraged patient and caregiver to contact us immediately if caregiver plan changes.      Patient and Caregiver  confirm understanding of need for a caregiver.      Patient and Caregiver  agree to contact  in a timely manner if concerns arise.      Able to take time off work without financial concerns: yes - Heidy is a .  She said that she can take time off post transplant.     Monthly Income: Sandra said that they are able to pay their bills with her 's income.    Patient and Caregiver  able to afford all costs now and if transplanted, including medications: yes -  verbalizes understanding of personal responsibilities related to transplant costs and the importance of having a financial plan to ensure that  patient’s transplant costs are fully covered.       provided fundraising information/education. Patient and Caregiver   verbalizes understanding.  remains available.     Primary Insurance (for UNOS reporting):  Prominence    Patient and Caregiver  verbalizes clear understanding that patient may experience difficulty obtaining and/or be denied insurance coverage post-surgery. This includes and is not limited to disability insurance, life insurance, health insurance, burial insurance, long term care insurance, and other insurances.      Patient and Caregiver  also reports understanding that future health concerns related to or unrelated to transplantation may not be covered by patient's insurance. Resources and information provided and reviewed.      Patient and Caregiver  provides verbal permission to release any necessary information to outside resources for patient care and discharge planning. Resources and information provided are reviewed.       Medical History:   Past Medical History[1]    Does the patient understand their primary cause of kidney failure? Yes    Dialysis Adherence: Patient  reports that she attends all of her dialysis treatments.     Medication and appointment adherence: Patient states that she is compliant. Adherence education and counseling provided.      Potential Living Donors: No  SW educated patient on the benefits of living donation. We discussed how to navigate the conversation when talking about their need for a kidney transplant. SW provided patient with tips on how to advertise living kidney donation. We discussed how their potential living donor can get in contact with the living donation team and counseled them on the importance of the living donor contacting us first. The recipient can not do it on their behalf, patient confirmed understanding.     Psychiatric History:      Patient presents as AAOx4 Yes    Psychiatric History: Denies   Current mood: calm,  cooperative, pleasant, engaged. good eye contact.  No tics. No mannerisms.   PHQ 9 Score: Not assessed  DOLORES Score: Not assessed   Current Psychiatrist/Counselor: No    Current Mental Health Medications: No    Suicide/Homicide Issues: None    Safety at home: Yes    Substance Use and Abuse History:          Onset of Use Amount Used  Date of last use Current Use   Alcohol:     when young     No, it was never a problem.   Tobacco:     When young, tried once.    No   Marijuana (PO, inhaled):  Never        Cocaine (IN, IV):     Never         methamphetamines (PO, IV, IN):  Never         Heroin (IN, IV):     Never         Hallucinogens:  Never        Rx Opioids   Never            Substance abuse comments and concerns: Patient said that she drank alcohol when she was young but it was never a problem for her.  She does not currently drink.    SW reviewed any potential concerns for prescription medication abuse w/ PharmD. If concerns were identified a plan w/ providers, patient, and  will be made in a separate note.   History of non-substance addictions:no  Arrests/DWI/Treatment/Rehab: no   Current/History of probation or parole (and or pending judgement): No    Feelings or Concerns: Patient said that she is excited about the possibility of transplant but also sad.  She was under the impression that she won't be able to leave her house.       Coping: Patient likes to read her bible, play games that her daughter taught her, go for walks and talk to her .   Goals after transplant: Patient wants to work, do her things and not wait on her , get off dialysis and travel.  Patient and her  have family in [place] and they haven't seen them in about 5 years.    Patient and Caregiver  states clear understanding of the potential impact of substance use as it relates to transplant candidacy and is aware of possible random substance screening. Substance abstinence/cessation counseling, education and  resources provided and reviewed.      Knowledge: Patient and Caregiver  states having clear understanding and realistic expectations regarding the potential risks and potential benefits of organ transplantation and organ donation and agrees to discuss with health care team members and support system members, as well as to utilize available resources and express questions and/or concerns in order to further facilitate the pt informed decision-making. Resources and information provided and reviewed.     Understanding: Patient and Caregiver  reports having a clear understanding of the many lifetime commitments involved with being a transplant recipient, including costs, compliance, medications, lab work, procedures, appointments, concrete and financial planning, preparedness, timely and appropriate communication of concerns, abstinence (ETOH, tobacco, illicit non-prescribed drugs), adherence to all health care team recommendations, support system and caregiver involvement, appropriate and timely resource utilization and follow-through, mental health counseling as needed/recommended, and patient and caregiver responsibilities. Social Service Handbook, resources and detailed educational information provided and reviewed. Educational information provided.     Patient and Caregiver  reports a clear understanding that risks and benefits may be involved with organ transplantation and with organ donation.      Patient and Caregiver  also reports clear understanding that psychosocial risk factors may affect patient and include but are not limited to feelings of depression, generalized anxiety, anxiety regarding dependence on others, post-traumatic stress disorder, feelings of guilt and other emotional and/or mental concerns, and/or exacerbation of existing mental health concerns. Detailed resources provided and discussed.      Patient and Caregiver  agrees to access appropriate resources in a timely manner as needed and/or as  recommended, and to communicate concerns appropriately. Patient and Caregiver  also reports a clear understanding of treatment options available.      Patient and Caregiver  received education in a group setting.  reviewed education, provided additional information, and answered questions.     Transplant Social Work - Candidacy     Psychosocial Suitability: Patient presents as Acceptable candidate for a kidney transplant at this time. Based on psychosocial risk factors, patient presents as Standard  for any post transplant psychosocial concerns.     SIPAT score: 5     0 - 6 Excellent candidate    Recommend to list without reservations.    Recommendations/Additional Comments: None        [1]   Past Medical History:  Diagnosis Date    Bleeding     Bowel habit changes     Dental disorder     upper partial    Diabetes     Insulin    Disorder of thyroid     History of anemia     Hypercholesterolemia     Hyperlipidemia     Hypertension     Renal disorder
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
## 2025-07-14 ENCOUNTER — TELEPHONE (OUTPATIENT)
Facility: MEDICAL CENTER | Age: 75
End: 2025-07-14
Payer: MEDICARE

## 2025-07-14 NOTE — TELEPHONE ENCOUNTER
Kidney Transplant RN Coordinator Brief Note     Spoke with the patient regarding living kidney donation. Informed the patient that any potential living donors should contact [facility] directly to begin the medical intake process. The patient mentioned she may have a friend who is willing to donate.    Explained that further kidney transplant evaluation and testing will be deferred until a living donor (LD) is identified, due to current insurance not being contracted with the institute. Once the institute receives CMS approval, we may be able to proceed with the full evaluation, including workup testing and exams.    At this time, the evaluation process will remain on hold unless a viable living donor comes forward.      Zackery Singh R.N.  Transplant Coordinator  [facility]

## 2025-07-29 ENCOUNTER — TELEPHONE (OUTPATIENT)
Facility: MEDICAL CENTER | Age: 75
End: 2025-07-29
Payer: MEDICARE

## 2025-07-29 ENCOUNTER — DOCUMENTATION (OUTPATIENT)
Facility: MEDICAL CENTER | Age: 75
End: 2025-07-29
Payer: MEDICARE

## 2025-07-29 NOTE — LETTER
2025    Sandra Rivera  [address]  [place] [address]      Dear Ms. Sukhwinder Rivera,       Thanks to a generous gift from the [employer], we were able to initiate your evaluation despite not yet being contracted with your insurance. Based on your initial clinical and psychosocial assessment, you appear to meet general criteria for kidney transplant candidacy. However, as our program is not yet contracted with your insurance provider and a living donor has not yet come forward, the Selection Committee on 25 has decided to defer further evaluation at this time. We will be able to move forward with your evaluation when a medically and psychosocially suitable living donor comes forward, or as our program continues to grow--either by beginning  donor transplant operations or finalizing a contract with your insurance. We understand that waiting can be difficult, and while we are hopeful that we will be able to expand our services soon, we want to ensure you have access to the care you need. If you would prefer to be referred to a transplant center that is both CMS-approved and contracted with your insurance, we would be happy to help facilitate that referral.    Your safety, well-being and long-term success are the heart of every decision we make - we appreciate the trust you have placed in us. If you have any questions or concerns, please feel free to reach out to us at [phone].      Sincerely,        The Team at [facility] [place]    CC: Mazin Stafford D.O.  [facility]         The Organ Procurement and Transplantation Network   Toll-free patient services line:  [phone]     Your resource for organ transplant information    Staffed 8:30 am - 5:00 pm ET Monday - Friday. Leave a message  to receive a call back    The Organ Procurement and Transplantation Network (OPTN) is the national transplant system. It  makes the policies that decide how donated organs are matched to patients waiting for a transplant.     The OPTN:    Makes sure donated organs get matched to people on the transplant waiting list  Tells people about the donation and transplant processes  Makes sure that the public knows about the need for more organ and tissue donations     The OPTN has a free patient services line that you can call to:    Get more information about:  Organ donation and organ transplants  Donation and transplant policies    2. Get an information kit with:  A list of transplant hospitals  Waiting list information    3. Talk about any questions you may have about your transplant hospital or organ procurement organization. The staff will do their best to help you or point you to others who may help.     4. Find out how you can volunteer with the OPTN and help shape transplant policy    The patient services line number is: [phone]     Patient services line staff cannot answer questions about your own medical care, including:     Waiting list status  Test results   Medical records    You will need to call your transplant hospital for this information.    The following websites have more information about transplantation and donation:    OPTN: https://optn.transplant.hrsa.gov/   For potential living donors and transplant recipients:  Living donation process: https://optn.transplant.hrsa.gov/living-donation/  Financial assistance: https://www.livingdonorassistance.org/  Transplantation data: https://www.srtr.org/  Organ donation: https://www.organdonor.gov/    Volunteer with the OPTN: https://optn.transplant.hrsa.gov/about/how-to-get-involved/    Updated: 04/15/2025

## 2025-07-29 NOTE — TELEPHONE ENCOUNTER
Kidney Transplant RN Coordinator Brief Note     25 - Patient Communication Note    Called patient to inform her that her kidney transplant evaluation has been deferred following the Selection Committee meeting held on 25.    Based on the initial clinical and psychosocial assessments, the patient appears to meet general criteria for kidney transplant candidacy. However, as the transplant program is not currently contracted with the patient's insurance provider, the committee reached a consensus to defer further evaluation at this time.    The patient’s case may be reconsidered under the following conditions:    Presentation of a medically and psychosocially suitable living donor  Initiation of  donor operations  Completion of insurance whitney with the transplant program    Discussed the living donor option with the patient; she stated that she does not currently have any living donor options.    This transplant coordinator remains available to the patient and will follow up with updates as they become available from [facility] regarding the status of her evaluation.      Zackery Singh R.N.  Transplant Coordinator  [facility]

## 2025-07-29 NOTE — PROGRESS NOTES
"TRANSPLANT SELECTION COMMITTEE NOTE - KIDNEY    CANDIDATE INFORMATION    Candidate: Sandra Rivera  MRN: [id number]  Age: 75 y.o.  Sex: Female  ABO Blood Type: B   BMI: Estimated body mass index is 25.06 kg/m² as calculated from the following:    Height as of 6/26/25: 1.575 m (5' 2\").    Weight as of 6/26/25: 62.1 kg (137 lb).    Organ Considered: Kidney  Transplant Phase: Evaluation  Transplant Status: Active    DATES    Evaluation Date: 6/26/2025   Committee Review Date: July 23, 2025    COMMITTEE MEMBERS PRESENT    Administration: Clotilde Malloy  Transplant Surgery: Paras Santoyo MD and Javier Atkinson MD  Transplant Medicine (Nephrology): Behzad Rojas MD  Nursing: Shabnam Chakraborty, RN and Zackery Singh RN  Nutrition: ---  Social Work: GA Barriga and GA Garza  Other: Veda Woodard, , Michelle Painter RN, and Ping Quesada     CLINICAL SUMMARY    Referring Physician: Dr. 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 female who was presented to the Multidisciplinary Transplant Selection Committee for evaluation.     Key Discussion Areas    N/A    FINAL COMMITTEE DETERMINATION    Decision: Deferred. Based on the initial clinical and psychosocial assessment, the patient appears to meet general criteria for kidney transplant candidacy. However, given that the program is not currently contracted with the patient's insurance provider, it was the consensus of the Selection Committee to defer further evaluation at this time. The patient's case may be reconsidered if a medically and psychosocially suitable living donor presents for evaluation, pending " initiation of  donor operations, or following completion of insurance whitney.